# Patient Record
Sex: MALE | Race: WHITE | NOT HISPANIC OR LATINO | Employment: FULL TIME | URBAN - METROPOLITAN AREA
[De-identification: names, ages, dates, MRNs, and addresses within clinical notes are randomized per-mention and may not be internally consistent; named-entity substitution may affect disease eponyms.]

---

## 2019-03-22 ENCOUNTER — OFFICE VISIT CONVERTED (OUTPATIENT)
Dept: ORTHOPEDIC SURGERY | Facility: CLINIC | Age: 49
End: 2019-03-22
Attending: ORTHOPAEDIC SURGERY

## 2019-08-13 ENCOUNTER — HOSPITAL ENCOUNTER (OUTPATIENT)
Dept: CARDIOLOGY | Facility: HOSPITAL | Age: 49
Discharge: HOME | End: 2019-08-13
Attending: FAMILY MEDICINE

## 2020-05-19 ENCOUNTER — HOSPITAL ENCOUNTER (OUTPATIENT)
Dept: LAB | Facility: HOSPITAL | Age: 50
Discharge: HOME | End: 2020-05-19
Attending: FAMILY MEDICINE

## 2020-05-19 LAB
ALBUMIN SERPL-MCNC: 4.6 G/DL (ref 3.5–5)
ALBUMIN/GLOB SERPL: 1.4 {RATIO} (ref 1.4–2.6)
ALP SERPL-CCNC: 84 U/L (ref 53–128)
ALT SERPL-CCNC: 47 U/L (ref 10–40)
ANION GAP SERPL CALC-SCNC: 25 MMOL/L (ref 8–19)
AST SERPL-CCNC: 32 U/L (ref 15–50)
BASOPHILS # BLD AUTO: 0.05 10*3/UL (ref 0–0.2)
BASOPHILS NFR BLD AUTO: 0.7 % (ref 0–3)
BILIRUB SERPL-MCNC: 0.46 MG/DL (ref 0.2–1.3)
BUN SERPL-MCNC: 16 MG/DL (ref 5–25)
BUN/CREAT SERPL: 12 {RATIO} (ref 6–20)
CALCIUM SERPL-MCNC: 10.3 MG/DL (ref 8.7–10.4)
CHLORIDE SERPL-SCNC: 100 MMOL/L (ref 99–111)
CHOLEST SERPL-MCNC: 168 MG/DL (ref 107–200)
CHOLEST/HDLC SERPL: 4.8 {RATIO} (ref 3–6)
CONV ABS IMM GRAN: 0.01 10*3/UL (ref 0–0.2)
CONV CO2: 22 MMOL/L (ref 22–32)
CONV IMMATURE GRAN: 0.1 % (ref 0–1.8)
CONV TOTAL PROTEIN: 7.8 G/DL (ref 6.3–8.2)
CREAT UR-MCNC: 1.3 MG/DL (ref 0.7–1.2)
DEPRECATED RDW RBC AUTO: 45.8 FL (ref 35.1–43.9)
EOSINOPHIL # BLD AUTO: 0.16 10*3/UL (ref 0–0.7)
EOSINOPHIL # BLD AUTO: 2.3 % (ref 0–7)
ERYTHROCYTE [DISTWIDTH] IN BLOOD BY AUTOMATED COUNT: 13.2 % (ref 11.6–14.4)
GFR SERPLBLD BASED ON 1.73 SQ M-ARVRAT: >60 ML/MIN/{1.73_M2}
GLOBULIN UR ELPH-MCNC: 3.2 G/DL (ref 2–3.5)
GLUCOSE SERPL-MCNC: 91 MG/DL (ref 70–99)
HCT VFR BLD AUTO: 49.9 % (ref 42–52)
HDLC SERPL-MCNC: 35 MG/DL (ref 40–60)
HGB BLD-MCNC: 16.8 G/DL (ref 14–18)
LDLC SERPL CALC-MCNC: 92 MG/DL (ref 70–100)
LYMPHOCYTES # BLD AUTO: 2.45 10*3/UL (ref 1–5)
LYMPHOCYTES NFR BLD AUTO: 35.8 % (ref 20–45)
MCH RBC QN AUTO: 31.8 PG (ref 27–31)
MCHC RBC AUTO-ENTMCNC: 33.7 G/DL (ref 33–37)
MCV RBC AUTO: 94.5 FL (ref 80–96)
MONOCYTES # BLD AUTO: 0.6 10*3/UL (ref 0.2–1.2)
MONOCYTES NFR BLD AUTO: 8.8 % (ref 3–10)
NEUTROPHILS # BLD AUTO: 3.58 10*3/UL (ref 2–8)
NEUTROPHILS NFR BLD AUTO: 52.3 % (ref 30–85)
NRBC CBCN: 0 % (ref 0–0.7)
OSMOLALITY SERPL CALC.SUM OF ELEC: 295 MOSM/KG (ref 273–304)
PLATELET # BLD AUTO: 321 10*3/UL (ref 130–400)
PMV BLD AUTO: 9.7 FL (ref 9.4–12.4)
POTASSIUM SERPL-SCNC: 4.8 MMOL/L (ref 3.5–5.3)
PSA SERPL-MCNC: 0.86 NG/ML (ref 0–4)
RBC # BLD AUTO: 5.28 10*6/UL (ref 4.7–6.1)
SODIUM SERPL-SCNC: 142 MMOL/L (ref 135–147)
T4 FREE SERPL-MCNC: 1.2 NG/DL (ref 0.9–1.8)
TESTOST SERPL-MCNC: 842 NG/DL (ref 193–740)
TRIGL SERPL-MCNC: 203 MG/DL (ref 40–150)
TSH SERPL-ACNC: 1.63 M[IU]/L (ref 0.27–4.2)
VLDLC SERPL-MCNC: 41 MG/DL (ref 5–37)
WBC # BLD AUTO: 6.85 10*3/UL (ref 4.8–10.8)

## 2020-05-22 LAB — CONV ESTROGENS, TOTAL, SERUM: 43 PG/ML (ref 40–115)

## 2020-10-13 ENCOUNTER — OFFICE VISIT CONVERTED (OUTPATIENT)
Dept: OTOLARYNGOLOGY | Facility: CLINIC | Age: 50
End: 2020-10-13
Attending: OTOLARYNGOLOGY

## 2020-12-08 ENCOUNTER — OFFICE VISIT CONVERTED (OUTPATIENT)
Dept: OTOLARYNGOLOGY | Facility: CLINIC | Age: 50
End: 2020-12-08
Attending: AUDIOLOGIST

## 2021-02-08 ENCOUNTER — OFFICE VISIT CONVERTED (OUTPATIENT)
Dept: INTERNAL MEDICINE | Facility: CLINIC | Age: 51
End: 2021-02-08
Attending: INTERNAL MEDICINE

## 2021-02-08 ENCOUNTER — HOSPITAL ENCOUNTER (OUTPATIENT)
Dept: OTHER | Facility: HOSPITAL | Age: 51
Discharge: HOME | End: 2021-02-08
Attending: INTERNAL MEDICINE

## 2021-02-08 ENCOUNTER — CONVERSION ENCOUNTER (OUTPATIENT)
Dept: INTERNAL MEDICINE | Facility: CLINIC | Age: 51
End: 2021-02-08

## 2021-02-08 LAB — PSA SERPL-MCNC: 0.55 NG/ML (ref 0–4)

## 2021-02-09 LAB
ALBUMIN SERPL-MCNC: 4.5 G/DL (ref 3.5–5)
ALBUMIN/GLOB SERPL: 1.3 {RATIO} (ref 1.4–2.6)
ALP SERPL-CCNC: 137 U/L (ref 53–128)
ALT SERPL-CCNC: 32 U/L (ref 10–40)
ANION GAP SERPL CALC-SCNC: 20 MMOL/L (ref 8–19)
AST SERPL-CCNC: 25 U/L (ref 15–50)
BASOPHILS # BLD AUTO: 0.07 10*3/UL (ref 0–0.2)
BASOPHILS NFR BLD AUTO: 0.8 % (ref 0–3)
BILIRUB SERPL-MCNC: 0.42 MG/DL (ref 0.2–1.3)
BUN SERPL-MCNC: 18 MG/DL (ref 5–25)
BUN/CREAT SERPL: 16 {RATIO} (ref 6–20)
CALCIUM SERPL-MCNC: 9.8 MG/DL (ref 8.7–10.4)
CHLORIDE SERPL-SCNC: 99 MMOL/L (ref 99–111)
CONV ABS IMM GRAN: 0.02 10*3/UL (ref 0–0.2)
CONV CO2: 24 MMOL/L (ref 22–32)
CONV IMMATURE GRAN: 0.2 % (ref 0–1.8)
CONV TOTAL PROTEIN: 7.9 G/DL (ref 6.3–8.2)
CREAT UR-MCNC: 1.11 MG/DL (ref 0.7–1.2)
DEPRECATED RDW RBC AUTO: 42.3 FL (ref 35.1–43.9)
EOSINOPHIL # BLD AUTO: 0.42 10*3/UL (ref 0–0.7)
EOSINOPHIL # BLD AUTO: 4.8 % (ref 0–7)
ERYTHROCYTE [DISTWIDTH] IN BLOOD BY AUTOMATED COUNT: 12.8 % (ref 11.6–14.4)
GFR SERPLBLD BASED ON 1.73 SQ M-ARVRAT: >60 ML/MIN/{1.73_M2}
GLOBULIN UR ELPH-MCNC: 3.4 G/DL (ref 2–3.5)
GLUCOSE SERPL-MCNC: 83 MG/DL (ref 70–99)
HCT VFR BLD AUTO: 46.9 % (ref 42–52)
HGB BLD-MCNC: 16.4 G/DL (ref 14–18)
IRON SATN MFR SERPL: 27 % (ref 20–55)
IRON SERPL-MCNC: 85 UG/DL (ref 70–180)
LYMPHOCYTES # BLD AUTO: 3.02 10*3/UL (ref 1–5)
LYMPHOCYTES NFR BLD AUTO: 34.6 % (ref 20–45)
MAGNESIUM SERPL-MCNC: 2.06 MG/DL (ref 1.6–2.3)
MCH RBC QN AUTO: 31.7 PG (ref 27–31)
MCHC RBC AUTO-ENTMCNC: 35 G/DL (ref 33–37)
MCV RBC AUTO: 90.7 FL (ref 80–96)
MONOCYTES # BLD AUTO: 0.64 10*3/UL (ref 0.2–1.2)
MONOCYTES NFR BLD AUTO: 7.3 % (ref 3–10)
NEUTROPHILS # BLD AUTO: 4.57 10*3/UL (ref 2–8)
NEUTROPHILS NFR BLD AUTO: 52.3 % (ref 30–85)
NRBC CBCN: 0 % (ref 0–0.7)
OSMOLALITY SERPL CALC.SUM OF ELEC: 289 MOSM/KG (ref 273–304)
PLATELET # BLD AUTO: 330 10*3/UL (ref 130–400)
PMV BLD AUTO: 9.6 FL (ref 9.4–12.4)
POTASSIUM SERPL-SCNC: 3.9 MMOL/L (ref 3.5–5.3)
RBC # BLD AUTO: 5.17 10*6/UL (ref 4.7–6.1)
SODIUM SERPL-SCNC: 139 MMOL/L (ref 135–147)
TESTOST SERPL-MCNC: 134 NG/DL (ref 193–740)
TIBC SERPL-MCNC: 313 UG/DL (ref 245–450)
TRANSFERRIN SERPL-MCNC: 219 MG/DL (ref 215–365)
TSH SERPL-ACNC: 2.29 M[IU]/L (ref 0.27–4.2)
VIT B12 SERPL-MCNC: 729 PG/ML (ref 211–911)
WBC # BLD AUTO: 8.74 10*3/UL (ref 4.8–10.8)

## 2021-03-09 ENCOUNTER — OFFICE VISIT CONVERTED (OUTPATIENT)
Dept: OTOLARYNGOLOGY | Facility: CLINIC | Age: 51
End: 2021-03-09
Attending: OTOLARYNGOLOGY

## 2021-03-19 ENCOUNTER — OFFICE VISIT CONVERTED (OUTPATIENT)
Dept: INTERNAL MEDICINE | Facility: CLINIC | Age: 51
End: 2021-03-19
Attending: INTERNAL MEDICINE

## 2021-03-19 ENCOUNTER — CONVERSION ENCOUNTER (OUTPATIENT)
Dept: INTERNAL MEDICINE | Facility: CLINIC | Age: 51
End: 2021-03-19

## 2021-04-15 ENCOUNTER — OFFICE VISIT CONVERTED (OUTPATIENT)
Dept: OTOLARYNGOLOGY | Facility: CLINIC | Age: 51
End: 2021-04-15
Attending: OTOLARYNGOLOGY

## 2021-04-19 ENCOUNTER — HOSPITAL ENCOUNTER (OUTPATIENT)
Dept: URGENT CARE | Facility: CLINIC | Age: 51
Discharge: HOME | End: 2021-04-19
Attending: NURSE PRACTITIONER

## 2021-04-20 LAB — SARS-COV-2 RNA SPEC QL NAA+PROBE: NOT DETECTED

## 2021-05-05 ENCOUNTER — OFFICE VISIT CONVERTED (OUTPATIENT)
Dept: INTERNAL MEDICINE | Facility: CLINIC | Age: 51
End: 2021-05-05
Attending: INTERNAL MEDICINE

## 2021-05-10 NOTE — H&P
History and Physical      Patient Name: Yousuf Em   Patient ID: [id number]   Sex: Male   YOB: 1970    Primary Care Provider: Niels Velasco MD   Referring Provider: Niels Velasco MD    Visit Date: October 13, 2020    Provider: Teo Lamb MD   [place] [facility]   Location Address: [address] [place] [address], [address]  [id number]   Location Phone: [phone]          Chief Complaint     1.  Hearing loss    2.  Eustachian tube dysfunction    3.  Otitis media       History Of Present Illness  Yousuf Em is a 50 year old /White male who presents to the office today as a consult from Niels Velasco MD.      He presents the clinic today for a 6-month history of decreased hearing, eustachian tube dysfunction, and otitis media with effusion.  He notes a lengthy history of recurrent ear disease as a child, and had frequent ear infections.  He informing that he has at least one ear infection per year with decreased hearing.  About 6 months ago, he noted decreased hearing in both ears, and started using hearing aids due to difficulty with hearing.  He denies any nasal symptoms such as progressively worsening congestion or obstruction, and has had no issues with nosebleeds.  He has not had a hearing test in quite some time.  He does have issues with seasonal allergies, and takes Flonase and Zyrtec as well as Pataday for this.  He notes that these issues are fairly stable.       Past Medical History  Allergic rhinitis; Arthritis; Depression; ETD (eustachian tube dysfunction); Hearing loss; High cholesterol; Hyperlipemia; Hypertension; Labral tear of long head of biceps tendon, left, initial encounter; Left shoulder labral tear ; Right lateral epicondylitis; Seasonal allergies; Thyroid disorder         Past Surgical History  Cholecystectomy; Colonoscopy; Gallbladder; Joint Surgery         Medication List  amphetamine 1.25 mg/mL oral suspen, IR - ER, biphasic 24hr;  "anastrozole 1 mg oral tablet; atorvastatin 20 mg oral tablet; carvedilol 6.25 mg oral tablet; levothyroxine 75 mcg oral tablet; lisinopril-hydrochlorothiazide 20-25 mg oral tablet; simvastatin 20 mg Oral tablet; venlafaxine 150 mg oral capsule,extended release 24hr         Allergy List  NO KNOWN DRUG ALLERGIES         Family Medical History  Family history of Arthritis; Family history of cancer; Family history of stroke; Family history of heart disease; Family history of diabetes mellitus         Social History  Alcohol Use (Current some day); .; lives with children; lives with other; lives with spouse; .; Recreational Drug Use (Current some day); Single.; Tobacco (Never); Working         Review of Systems  · Constitutional  o Denies  o : fever, night sweats, weight loss  · Eyes  o Denies  o : discharge from eye, impaired vision  · HENT  o Admits  o : *See HPI  · Cardiovascular  o Denies  o : chest pain, irregular heart beats  · Respiratory  o Denies  o : shortness of breath, wheezing, coughing up blood  · Gastrointestinal  o Denies  o : heartburn, reflux, vomiting blood  · Genitourinary  o Denies  o : frequency  · Integument  o Denies  o : rash, skin dryness  · Neurologic  o Denies  o : seizures, loss of balance, loss of consciousness, dizziness  · Endocrine  o Denies  o : cold intolerance, heat intolerance  · Heme-Lymph  o Denies  o : easy bleeding, anemia      Vitals  Date Time BP Position Site L\R Cuff Size HR RR TEMP (F) WT  HT  BMI kg/m2 BSA m2 O2 Sat FR L/min FiO2        10/13/2020 10:29 AM        97.3 252lbs 8oz 5'  8\" 38.39 2.34             Physical Examination  · Constitutional  o Appearance  o : well developed, well-nourished, alert and in no acute distress, voice clear and strong  · Head and Face  o Head  o :   § Inspection  § : no deformities or lesions  o Face  o :   § Inspection  § : No facial lesions; House-Brackmann I/VI bilaterally  § Palpation  § : No TMJ crepitus nor  " muscle tenderness bilaterally  · Eyes  o Vision  o :   § Visual Fields  § : Extraocular movements are intact. No spontaneous or gaze-induced nystagmus.  o Conjunctivae  o : clear  o Sclerae  o : clear  o Pupils and Irises  o : pupils equal, round, and reactive to light.   · Ears, Nose, Mouth and Throat  o Ears  o :   § External Ears  § : appearance within normal limits, no lesions present  § Otoscopic Examination  § : tympanic membrane appearance dull and hypervascular bilaterally, consistent with otitis media. The left ear drum was focused on using the microscope and phenol was used to anesthetize the anterior inferior quadrant. An incision was made and thick mucoid inspissated mucus was suctioned out of the middle ear. An Ellis tube was placed without difficulty and Ciprodex drops were infused. The patient immediately noted improvement in his hearing. The same procedure was then performed on the right ear with the same findings and results.  § Hearing  § : intact to conversational voice both ears  o Nose  o :   § External Nose  § : appearance normal  § Intranasal Exam  § : mucosa within normal limits, vestibules normal, no intranasal lesions present, septum midline, sinuses non tender to percussion  o Oral Cavity  o :   § Oral Mucosa  § : oral mucosa normal without pallor or cyanosis  § Lips  § : lip appearance normal  § Teeth  § : normal dentition for age  § Gums  § : gums pink, non-swollen, no bleeding present  § Tongue  § : tongue appearance normal; normal mobility  § Palate  § : hard palate normal, soft palate appearance normal with symmetric mobility  o Throat  o :   § Oropharynx  § : no inflammation or lesions present, tonsils within normal limits  § Hypopharynx  § : appearance within normal limits, superior epiglottis within normal limits  § Larynx  § : appearance within normal limits, vocal cords within normal limits, no lesions present  · Neck  o Inspection/Palpation  o : normal appearance, no masses  or tenderness, trachea midline; thyroid size normal, nontender, no nodules or masses present on palpation  · Respiratory  o Respiratory Effort  o : breathing unlabored  o Inspection of Chest  o : normal appearance, no retractions  · Cardiovascular  o Heart  o : regular rate and rhythm  · Lymphatic  o Neck  o : no lymphadenopathy present  o Supraclavicular Nodes  o : no lymphadenopathy present  o Preauricular Nodes  o : no lymphadenopathy present  · Skin and Subcutaneous Tissue  o General Inspection  o : Regarding face and neck - there are no rashes present, no lesions present, and no areas of discoloration  · Neurologic  o Cranial Nerves  o : cranial nerves II-XII are grossly intact bilaterally  o Gait and Station  o : normal gait, able to stand without diffculty  · Psychiatric  o Judgement and Insight  o : judgment and insight intact  o Mood and Affect  o : mood normal, affect appropriate          Assessment  · Eustachian tube dysfunction     381.81/H69.80  · Hearing loss     389.9/H91.90  · Otitis media     382.9/H66.90      Plan  · Orders  o PETs [facility] ([id number]) - 381.81/H69.80, 389.9/H91.90, 382.9/H66.90 - 10/14/2020   Both left and right ears were operated on today. [id number], bilateral procedure  o Audiometry, pure-tone (threshold); air and bone ([id number]) - 381.81/H69.80, 389.9/H91.90, 382.9/H66.90 - 11/25/2020  o Tympanogram (Impedance Testing) [facility] ([id number]) - 381.81/H69.80, 389.9/H91.90, 382.9/H66.90 - 10/14/2020  · Medications  o Medications have been Reconciled  o Transition of Care or Provider Policy  · Instructions  o He presents the clinic today for a 6-month history of decreased hearing, eustachian tube dysfunction, and otitis media with effusion. He notes a lengthy history of recurrent ear disease as a child, and had frequent ear infections. He informing that he has at least one ear infection per year with decreased hearing. About 6 months ago, he noted decreased hearing in both ears, and started using hearing  aids due to difficulty with hearing. He denies any nasal symptoms such as progressively worsening congestion or obstruction, and has had no issues with nosebleeds. He has not had a hearing test in quite some time. He does have issues with seasonal allergies, and takes Flonase and Zyrtec as well as Pataday for this. He notes that these issues are fairly stable. On exam today he had bilateral mucoid effusions. I obtained a tympanogram which showed flat responses, consistent with middle ear fluid. I discussed this with him and did recommend bilateral myringotomy and PE tube placement which she elected to go ahead and proceed with today in the clinic. I was able to perform the procedure on both sides and he did have thick mucoid fluid which was suctioned out of the middle ear. He was very pleased with the improvement in his hearing. I will plan on seeing him back in the clinic in 6 weeks and will obtain an audiogram at that point.  o Electronically Identified Patient Education Materials Provided Electronically  · Correspondence  o ENT Letter to Referring MD (Niels Velasco MD) - 10/14/2020            Electronically Signed by: Teo Lamb MD -Author on October 14, 2020 02:49:17 PM

## 2021-05-10 NOTE — H&P
My Depression Action Plan  Name: Alexandru Crews   Date of Birth 9/22/1932  Date: 5/23/2019    My doctor: Paul Knowles   My clinic: [address]  [place] [address]  [phone]          GREEN    ZONE   Good Control    What it looks like:     Things are going generally well. You have normal up s and down s. You may even feel depressed from time to time, but bad moods usually last less than a day.   What you need to do:  1. Continue to care for yourself (see self care plan)  2. Check your depression survival kit and update it as needed  3. Follow your physician s recommendations including any medication.  4. Do not stop taking medication unless you consult with your physician first.           YELLOW         ZONE Getting Worse    What it looks like:     Depression is starting to interfere with your life.     It may be hard to get out of bed; you may be starting to isolate yourself from others.    Symptoms of depression are starting to last most all day and this has happened for several days.     You may have suicidal thoughts but they are not constant.   What you need to do:     1. Call your care team, your response to treatment will improve if you keep your care team informed of your progress. Yellow periods are signs an adjustment may need to be made.     2. Continue your self-care, even if you have to fake it!    3. Talk to someone in your support network    4. Open up your depression survival kit           RED    ZONE Medical Alert - Get Help    What it looks like:     Depression is seriously interfering with your life.     You may experience these or other symptoms: You can t get out of bed most days, can t work or engage in other necessary activities, you have trouble taking care of basic hygiene, or basic responsibilities, thoughts of suicide or death that will not go away, self-injurious behavior.     What you need to do:  1. Call your care team and request  "   History and Physical      Patient Name: Yousuf Em   Patient ID: [id number]   Sex: Male   YOB: 1970    Primary Care Provider: Niels Velasco MD   Referring Provider: Niels Velasco MD    Visit Date: February 8, 2021    Provider: Angela Smith MD   Location: [facility]   Location Address: [address] [place] [address], [address]  [place], [place]  [id number]   Location Phone: [phone]          Chief Complaint  · \"Establish Care\"  · \"I was taking Testosterone in the past but haven't taken any in about 3 months\"      History Of Present Illness  Yousuf Em is a 50 year old /White male who presents for evaluation and treatment of:      Last PCP: Dr Florence  Last labs: about 9 months ago  Colon: has one schedule for 08/2021  Flu: 02/2021  Shingles: 02/2021    Fatigue-has had on going fatigue for several years was taking testosterone injections but have been off the medication for several months but has not noticed change in symptoms expect for less sweating. Reports has gained 30lbs and is concerned this may be due to issues with fatigue or also could be related to less activity during pandemic.    HTN-Reports started carvedilol 4 months ago, averages now 114/80. denies chest pain and headaches, does have occasional shortness of breath when doing normal activities, sometimes feels like legs are heavy and does stretching, denies leg cramps    Depression/Anxiety- taking effexor reports doing well, denies SI/HI    Insomnia-wakes up frequently, most night takes Zquil    Thyroid-reports taking thyroid medication regularly, needs labs checked for this    Right Hand Injury-August right hand injury cut tips of 3rd and 4th digits had loss of sensation in 4th digit    Ear Tubes-Recently had ear tubes tubes put in had a chronic ear infection for 9 months reports much improved symptoms since then    Hx of cholitis reports has occasional bouts of diarrhea and constipation related to this and " a same-day appointment. If they are not available (weekends or after hours) call your local crisis line, emergency room or 911.            Depression Self Care Plan / Survival Kit    Self-Care for Depression  Here s the deal. Your body and mind are really not as separate as most people think.  What you do and think affects how you feel and how you feel influences what you do and think. This means if you do things that people who feel good do, it will help you feel better.  Sometimes this is all it takes.  There is also a place for medication and therapy depending on how severe your depression is, so be sure to consult with your medical provider and/ or Behavioral Health Consultant if your symptoms are worsening or not improving.     In order to better manage my stress, I will:    Exercise  Get some form of exercise, every day. This will help reduce pain and release endorphins, the  feel good  chemicals in your brain. This is almost as good as taking antidepressants!  This is not the same as joining a gym and then never going! (they count on that by the way ) It can be as simple as just going for a walk or doing some gardening, anything that will get you moving.      Hygiene   Maintain good hygiene (Get out of bed in the morning, Make your bed, Brush your teeth, Take a shower, and Get dressed like you were going to work, even if you are unemployed).  If your clothes don't fit try to get ones that do.    Diet  I will strive to eat foods that are good for me, drink plenty of water, and avoid excessive sugar, caffeine, alcohol, and other mood-altering substances.  Some foods that are helpful in depression are: complex carbohydrates, B vitamins, flaxseed, fish or fish oil, fresh fruits and vegetables.    Psychotherapy  I agree to participate in Individual Therapy (if recommended).    Medication  If prescribed medications, I agree to take them.  Missing doses can result in serious side effects.  I understand that drinking  several years ago had polyps removed.       Past Medical History  Disease Name Date Onset Notes   Allergic rhinitis --  --    Arthritis --  --    Colitis --  --    Depression --  --    ETD (eustachian tube dysfunction) --  --    Forgetfulness --  --    Hearing loss --  --    High cholesterol --  --    Hyperlipemia --  --    Hypertension --  --    Labral tear of long head of biceps tendon, left, initial encounter 09/30/2015 --    Left shoulder labral tear  09/25/2015 --    Right lateral epicondylitis 10/21/2016 --    Seasonal allergies --  --    Thyroid disorder --  --          Past Surgical History  Procedure Name Date Notes   Cholecystectomy 2000 --    Colonoscopy --  --    Gallbladder --  --    Joint Surgery --  --          Medication List  Name Date Started Instructions   atorvastatin 20 mg oral tablet  take 1 tablet (20 mg) by oral route once daily   carvedilol 6.25 mg oral tablet  take 1 tablet (6.25 mg) by oral route 2 times per day with food   levothyroxine 75 mcg oral tablet  take 1 tablet (75 mcg) by oral route once daily   lisinopril-hydrochlorothiazide 20-25 mg oral tablet  take 2 tablets by oral route once daily   venlafaxine 75 mg oral tablet extended release 24hr 02/08/2021 one tab po BID for 2 weeks; then take one po daily for 2 weeks         Allergy List  Allergen Name Date Reaction Notes   NO KNOWN DRUG ALLERGIES --  --  --          Family Medical History  Disease Name Relative/Age Notes   Family history of Arthritis Mother/   Mother   Family history of cancer Mother/   --    Family history of stroke Grandmother (maternal)/   --    Family history of heart disease Father/  Mother/  Sister/   --    Family history of diabetes mellitus Mother/   --          Social History  Finding Status Start/Stop Quantity Notes   Alcohol Use Current some day --/-- --  rarely drinks, less than 1 drink per day, has been drinking for 11-20 years   . --  --/-- --  --    lives with children --  --/-- --  --   "  lives with other --  --/-- --  --    lives with spouse --  --/-- --  --    . --  --/-- --  --    Recreational Drug Use Current some day --/-- --  yes  no   Single. --  --/-- --  --    Tobacco Never --/-- --  never smoker   Working --  --/-- --  --          Review of Systems  · Constitutional  o Admits  o : fatigue  o Denies  o : fever, weight loss, weight gain  · Cardiovascular  o Denies  o : lower extremity edema, claudication, chest pressure, palpitations  · Respiratory  o Denies  o : shortness of breath, wheezing, frequent cough, hemoptysis, dyspnea on exertion  · Gastrointestinal  o Denies  o : nausea, vomiting, diarrhea, constipation, abdominal pain      Vitals  Date Time BP Position Site L\R Cuff Size HR RR TEMP (F) WT  HT  BMI kg/m2 BSA m2 O2 Sat FR L/min FiO2 HC       03/22/2019 03:23 PM      113 - R   235lbs 0oz 5'  8\" 35.73 2.26 96 %      10/13/2020 10:29 AM        97.3 252lbs 8oz 5'  8\" 38.39 2.34       12/08/2020 10:55 AM        97.8 250lbs 0oz 5'  8\" 38.01 2.33       02/08/2021 02:10 /88 Sitting    78 - R  97.8 258lbs 0oz 5'  8\" 39.23 2.37 97 %  21%          Physical Examination  · Constitutional  o Appearance  o : obese, well developed  · Head and Face  o Head  o :   § Inspection  § : atraumatic, normocephalic  · Eyes  o Eyes  o : extraocular movements intact, no scleral icterus, no conjunctival injection  · Respiratory  o Respiratory Effort  o : breathing comfortably, symmetric chest rise  o Auscultation of Lungs  o : clear to asculatation bilaterally, no wheezes, rales, or rhonchii  · Cardiovascular  o Heart  o :   § Auscultation of Heart  § : regular rate and rhythm, no murmurs, rubs, or gallops  o Peripheral Vascular System  o :   § Extremities  § : no edema  · Neurologic  o Mental Status Examination  o :   § Orientation  § : grossly oriented to person, place and time  o Gait and Station  o :   § Gait Screening  § : normal gait  · Psychiatric  o General  o : normal mood and " alcohol, or other illicit drug use, may cause potential side effects.  I will not stop my medication abruptly without first discussing it with my provider.    Staying Connected With Others  I will stay in touch with my friends, family members, and my primary care provider/team.    Use your imagination  Be creative.  We all have a creative side; it doesn t matter if it s oil painting, sand castles, or mud pies! This will also kick up the endorphins.    Witness Beauty  (AKA stop and smell the roses) Take a look outside, even in mid-winter. Notice colors, textures. Watch the squirrels and birds.     Service to others  Be of service to others.  There is always someone else in need.  By helping others we can  get out of ourselves  and remember the really important things.  This also provides opportunities for practicing all the other parts of the program.    Humor  Laugh and be silly!  Adjust your TV habits for less news and crime-drama and more comedy.    Control your stress  Try breathing deep, massage therapy, biofeedback, and meditation. Find time to relax each day.     My support system    Clinic Contact:  Phone number:    Contact 1:  Phone number:    Contact 2:  Phone number:    Taoist/:  Phone number:    Therapist:  Phone number:    Local crisis center:    Phone number:    Other community support:  Phone number:       affect          Assessment  · Hypertension     401.9/I10  Discussed possibly adjust blood pressure medications however for now stay the same  · Hyperlipemia     272.4/E78.5  · Thyroid disorder     246.9/E07.9  Will check labs adjust meds as needed based on results  · Screening for depression     V79.0/Z13.89  · Screening for prostate cancer     V76.44/Z12.5  · Fatigue     780.79/R53.83  · Major depressive disorder     296.20/F32.2  Will decrease Effexor  Will start Trintellix  · Obesity     278.00/E66.9       Will check labs adjust meds as needed based on results  Discussed obesity and changing diet exercise     Problems Reconciled  Plan  · Orders  o PSA Ultrasensitive, ANNUAL SCREENING [facility] (95665, ) - V76.44/Z12.5 - 02/08/2021  o Male Fatigue Panel (CBC, CMP, TSH, B12, Testosterone) [facility] (91541, 93790, 43986, 44668, 04754) - 780.79/R53.83 - 02/08/2021   consider stopping lisinopril/hctz at night  o Iron panel (iron, TIBC, transferrin saturation) (48596, 83372, 47525) - 780.79/R53.83 - 02/08/2021  o ACO-18: Positive screen for clinical depression using a standardized tool and a follow-up plan documented () - - 02/08/2021  o ACO-14: Influenza immunization administered or previously received [facility] () - - 02/08/2021  o ACO-39: Current medications updated and reviewed (, 1159F) - - 02/08/2021  o Magnesium, serum (16211) - 401.9/I10 - 02/08/2021  o PSA ultrasensitive DIAGNOSTIC [facility] (02697) - - 02/08/2021  · Medications  o Trintellix 5 mg oral tablet   SIG: take 1 tablet (5 mg) by oral route once daily at the same time each day   DISP: (90) Tablet with 0 refills  Prescribed on 02/08/2021     o venlafaxine 75 mg oral tablet extended release 24hr   SIG: one tab po BID for 2 weeks; then take one po daily for 2 weeks   DISP: (60) Tablet with 0 refills  Adjusted on 02/08/2021     o Transition of Care or Provider Policy  · Instructions  o Depression Screen completed and scanned into the EMR under the designated  folder within the patient's documents.  o Today's PHQ-9 result is _16__  o Patient was educated/instructed on their diagnosis, treatment and medications prior to discharge from the clinic today.  · Disposition  o 1 Month Follow Up            Electronically Signed by: Angela Smith MD -Author on February 8, 2021 11:54:19 PM

## 2021-05-13 NOTE — PROGRESS NOTES
Progress Note      Patient Name: Yousuf Em   Patient ID: [id number]   Sex: Male   YOB: 1970    Primary Care Provider: Niels Velasco MD   Referring Provider: Niels Velasco MD    Visit Date: December 8, 2020    Provider: Teo Lamb MD   Location: [facility]   Location Address: [address] [place] [address], [address]  [id number]   Location Phone: [phone]          Chief Complaint     1.  Eustachian tube dysfunction    2.  Bilateral ear drainage    3.  Hearing loss, asymmetric       History Of Present Illness  Yousuf Em is a 50 year old /White male who presents to the office today for a follow-up visit.      He presents the clinic today for follow-up regarding issues with his ears and bilateral ear infections that was diagnosed at the last clinic visit.  He had thick mucoid effusions, and PE tubes were placed at that time.  He notes that his hearing has been much better after the tubes were placed, but he has had significant drainage which is purulent out of both of his ears.  He denies any ear pain or pressure.  He did have an audiogram today which shows mild hearing loss rising to normal hearing the right ear downsloping to moderate sensorineural hearing loss with noise notch type pattern.  The left ear is significantly weaker than the right.  Tympanograms suggest patent and functioning PE tubes.  Word discrimination scores are 100% on the right and 60% in the left.  He informing today that he has had asymmetric hearing for over 25 years with the left ear being weaker.  He has been keeping his ears dry in general.       Past Medical History  Allergic rhinitis; Arthritis; Depression; ETD (eustachian tube dysfunction); Hearing loss; High cholesterol; Hyperlipemia; Hypertension; Labral tear of long head of biceps tendon, left, initial encounter; Left shoulder labral tear ; Right lateral epicondylitis; Seasonal allergies; Thyroid disorder         Past  "Surgical History  Cholecystectomy; Colonoscopy; Gallbladder; Joint Surgery         Medication List  amphetamine 1.25 mg/mL oral suspen, IR - ER, biphasic 24hr; anastrozole 1 mg oral tablet; atorvastatin 20 mg oral tablet; carvedilol 6.25 mg oral tablet; levothyroxine 75 mcg oral tablet; lisinopril-hydrochlorothiazide 20-25 mg oral tablet; simvastatin 20 mg Oral tablet; venlafaxine 150 mg oral capsule,extended release 24hr         Allergy List  NO KNOWN DRUG ALLERGIES         Family Medical History  Family history of Arthritis; Family history of cancer; Family history of stroke; Family history of heart disease; Family history of diabetes mellitus         Social History  Alcohol Use (Current some day); .; lives with children; lives with other; lives with spouse; .; Recreational Drug Use (Current some day); Single.; Tobacco (Never); Working         Review of Systems  · Constitutional  o Denies  o : fever, night sweats, weight loss  · Eyes  o Denies  o : discharge from eye, impaired vision  · HENT  o Admits  o : *See HPI  · Cardiovascular  o Denies  o : chest pain, irregular heart beats  · Respiratory  o Denies  o : shortness of breath, wheezing, coughing up blood  · Gastrointestinal  o Denies  o : heartburn, reflux, vomiting blood  · Genitourinary  o Denies  o : frequency  · Integument  o Denies  o : rash, skin dryness  · Neurologic  o Denies  o : seizures, loss of balance, loss of consciousness, dizziness  · Endocrine  o Denies  o : cold intolerance, heat intolerance  · Heme-Lymph  o Denies  o : easy bleeding, anemia      Vitals  Date Time BP Position Site L\R Cuff Size HR RR TEMP (F) WT  HT  BMI kg/m2 BSA m2 O2 Sat FR L/min FiO2 HC       12/08/2020 10:55 AM        97.8 250lbs 0oz 5'  8\" 38.01 2.33             Physical Examination  · Constitutional  o Appearance  o : well developed, well-nourished, alert and in no acute distress, voice clear and strong  · Head and Face  o Head  o :   § Inspection  § : " no deformities or lesions  o Face  o :   § Inspection  § : No facial lesions; House-Brackmann I/VI bilaterally  § Palpation  § : No TMJ crepitus nor  muscle tenderness bilaterally  · Eyes  o Vision  o :   § Visual Fields  § : Extraocular movements are intact. No spontaneous or gaze-induced nystagmus.  o Conjunctivae  o : clear  o Sclerae  o : clear  o Pupils and Irises  o : pupils equal, round, and reactive to light.   · Ears, Nose, Mouth and Throat  o Ears  o :   § External Ears  § : appearance within normal limits, no lesions present  § Otoscopic Examination  § : Cerumen impactions bilaterally, cleared with suction and microscope, mucoid effusions bilaterally with some granulation tissue surrounding the PE tube on the right side. PE tubes are patent and functioning. Ciprodex drops were infused.  § Hearing  § : intact to conversational voice both ears  o Nose  o :   § External Nose  § : appearance normal  § Intranasal Exam  § : mucosa within normal limits, vestibules normal, no intranasal lesions present, septum midline, sinuses non tender to percussion  o Oral Cavity  o :   § Oral Mucosa  § : oral mucosa normal without pallor or cyanosis  § Lips  § : lip appearance normal  § Teeth  § : normal dentition for age  § Gums  § : gums pink, non-swollen, no bleeding present  § Tongue  § : tongue appearance normal; normal mobility  § Palate  § : hard palate normal, soft palate appearance normal with symmetric mobility  o Throat  o :   § Oropharynx  § : no inflammation or lesions present, tonsils within normal limits  § Hypopharynx  § : appearance within normal limits, superior epiglottis within normal limits  § Larynx  § : appearance within normal limits, vocal cords within normal limits, no lesions present  o Nasopharyngoscopy  o : The patients nares were anesthetized with Lidocaine with Afrin. After this was done, the flexible nasopharyngoscope was passed through both the left and right sides. The nasal  cavities free of any lesions, polyps, or purulence. The nasopharynx is normal-appearing without any evidence of masses.   · Neck  o Inspection/Palpation  o : normal appearance, no masses or tenderness, trachea midline; thyroid size normal, nontender, no nodules or masses present on palpation  · Respiratory  o Respiratory Effort  o : breathing unlabored  o Inspection of Chest  o : normal appearance, no retractions  · Cardiovascular  o Heart  o : regular rate and rhythm  · Lymphatic  o Neck  o : no lymphadenopathy present  o Supraclavicular Nodes  o : no lymphadenopathy present  o Preauricular Nodes  o : no lymphadenopathy present  · Skin and Subcutaneous Tissue  o General Inspection  o : Regarding face and neck - there are no rashes present, no lesions present, and no areas of discoloration  · Neurologic  o Cranial Nerves  o : cranial nerves II-XII are grossly intact bilaterally  o Gait and Station  o : normal gait, able to stand without diffculty  · Psychiatric  o Judgement and Insight  o : judgment and insight intact  o Mood and Affect  o : mood normal, affect appropriate          Assessment  · Cerumen impaction     380.4/H61.20  · Eustachian tube dysfunction     381.81/H69.80  · Hearing loss     389.9/H91.90  · Otitis media     382.9/H66.90      Plan  · Orders  o Nasal endoscopy [facility] ([id number]) - 381.81/H69.80, 389.9/H91.90, 382.9/H66.90 - 12/08/2020  o Cerumen Removal by Provider, Unilateral [facility] ([id number]) - 380.4/H61.20, 389.9/H91.90, 382.9/H66.90 - 12/08/2020  · Medications  o Ciprodex 0.3-0.1 % otic (ear) drops,suspension   SIG: Use 3 drops to both ears 3 times per day for 7 days   DISP: (1) Bottle with 0 refills  Prescribed on 12/08/2020     o Medications have been Reconciled  o Transition of Care or Provider Policy  · Instructions  o He presents the clinic today for follow-up regarding issues with his ears and bilateral ear infections that was diagnosed at the last clinic visit. He had thick mucoid effusions, and  PE tubes were placed at that time. He notes that his hearing has been much better after the tubes were placed, but he has had significant drainage which is purulent out of both of his ears. He denies any ear pain or pressure. He did have an audiogram today which shows mild hearing loss rising to normal hearing the right ear downsloping to moderate sensorineural hearing loss with noise notch type pattern. The left ear is significantly weaker than the right. Tympanograms suggest patent and functioning PE tubes. Word discrimination scores are 100% on the right and 60% in the left. He informing today that he has had asymmetric hearing for over 25 years with the left ear being weaker. He has been keeping his ears dry in general. On exam, there was still some cerumen impaction and purulent drainage bilaterally which was cleared out. I did perform a nasolaryngoscopy to rule out nasopharyngeal mass, the exam was normal. Ciprodex drops were infused. I will prescribe drops for him to use for the next 7 days. I will plan to see him back in the clinic in 2 or 3 months to reassess the ears.  o Electronically Identified Patient Education Materials Provided Electronically  · Correspondence  o ENT Letter to Referring MD (Niels Velasco MD) - 12/08/2020            Electronically Signed by: Teo Lamb MD -Author on December 8, 2020 01:15:49 PM

## 2021-05-14 VITALS — WEIGHT: 262.12 LBS | TEMPERATURE: 97.4 F | HEIGHT: 68 IN | BODY MASS INDEX: 39.73 KG/M2

## 2021-05-14 VITALS — HEIGHT: 68 IN | WEIGHT: 252.5 LBS | BODY MASS INDEX: 38.27 KG/M2 | TEMPERATURE: 97.3 F

## 2021-05-14 VITALS
SYSTOLIC BLOOD PRESSURE: 140 MMHG | OXYGEN SATURATION: 97 % | DIASTOLIC BLOOD PRESSURE: 88 MMHG | HEART RATE: 78 BPM | WEIGHT: 258 LBS | HEIGHT: 68 IN | BODY MASS INDEX: 39.1 KG/M2 | TEMPERATURE: 97.8 F

## 2021-05-14 VITALS
WEIGHT: 263 LBS | BODY MASS INDEX: 39.86 KG/M2 | SYSTOLIC BLOOD PRESSURE: 150 MMHG | DIASTOLIC BLOOD PRESSURE: 100 MMHG | TEMPERATURE: 98.3 F | HEART RATE: 93 BPM | OXYGEN SATURATION: 97 % | HEIGHT: 68 IN

## 2021-05-14 VITALS — BODY MASS INDEX: 40.33 KG/M2 | TEMPERATURE: 97.5 F | HEIGHT: 68 IN | WEIGHT: 266.12 LBS

## 2021-05-14 VITALS — TEMPERATURE: 97.8 F | BODY MASS INDEX: 37.89 KG/M2 | HEIGHT: 68 IN | WEIGHT: 250 LBS

## 2021-05-14 NOTE — PROGRESS NOTES
Progress Note      Patient Name: Yousuf Em   Patient ID: [id number]   Sex: Male   YOB: 1970    Primary Care Provider: Angela Smith MD   Referring Provider: Niels Velasco MD    Visit Date: April 15, 2021    Provider: Teo Lamb MD   [place] [facility]   Location Address: [address] [place] [address], [address]  [id number]   Location Phone: [phone]          Chief Complaint     1.  Decreased hearing left ear    2.  Eustachian tube dysfunction    3.  Ear itching       History Of Present Illness  Yousuf Em is a 50 year old /White male who presents to the office today for a follow-up visit.      He presents the clinic today for follow-up regarding recent decreased hearing in the left ear, as well as ear itching issues.  The patient does have eustachian tube dysfunction and PE tubes were placed in the office for this due to chronic mucoid effusions.  He noted significant improvement in his hearing, does have a sensorineural hearing loss as well.  He notes recently his left ear has been decreased in hearing, and there is ear itching bilaterally.  He has had some drainage which she describes as mostly clear.       Past Medical History  Allergic rhinitis; Arthritis; Colitis; Depression; ETD (eustachian tube dysfunction); Forgetfulness; Hearing loss; High cholesterol; Hyperlipemia; Hypertension; Labral tear of long head of biceps tendon, left, initial encounter; Left shoulder labral tear ; Right lateral epicondylitis; Seasonal allergies; Thyroid disorder         Past Surgical History  Cholecystectomy; Colonoscopy; Gallbladder; Joint Surgery         Medication List  atorvastatin 20 mg oral tablet; carvedilol 6.25 mg oral tablet; chlorthalidone 25 mg oral tablet; levothyroxine 75 mcg oral tablet; losartan 50 mg oral tablet; Pristiq 50 mg oral tablet extended release 24 hr         Allergy List  NO KNOWN DRUG ALLERGIES         Family Medical History  Family  "history of Arthritis; Family history of cancer; Family history of stroke; Family history of heart disease; Family history of diabetes mellitus         Social History  Alcohol Use (Current some day); .; lives with children; lives with other; lives with spouse; .; Recreational Drug Use (Current some day); Single.; Tobacco (Never); Working         Immunizations  Name Date Admin   Influenza 02/01/2021   Shingles 02/01/2021         Review of Systems  · Constitutional  o Denies  o : fever, night sweats, weight loss  · Eyes  o Denies  o : discharge from eye, impaired vision  · HENT  o Admits  o : *See HPI  · Cardiovascular  o Denies  o : chest pain, irregular heart beats  · Respiratory  o Denies  o : shortness of breath, wheezing, coughing up blood  · Gastrointestinal  o Denies  o : heartburn, reflux, vomiting blood  · Genitourinary  o Denies  o : frequency  · Integument  o Denies  o : rash, skin dryness  · Neurologic  o Denies  o : seizures, loss of balance, loss of consciousness, dizziness  · Endocrine  o Denies  o : cold intolerance, heat intolerance  · Heme-Lymph  o Denies  o : easy bleeding, anemia      Vitals  Date Time BP Position Site L\R Cuff Size HR RR TEMP (F) WT  HT  BMI kg/m2 BSA m2 O2 Sat FR L/min FiO2 HC       04/15/2021 10:23 AM        97.5 266lbs 2oz 5'  8\" 40.46 2.41             Physical Examination  · Constitutional  o Appearance  o : well developed, well-nourished, alert and in no acute distress, voice clear and strong  · Head and Face  o Head  o :   § Inspection  § : no deformities or lesions  o Face  o :   § Inspection  § : No facial lesions; House-Brackmann I/VI bilaterally  § Palpation  § : No TMJ crepitus nor  muscle tenderness bilaterally  · Eyes  o Vision  o :   § Visual Fields  § : Extraocular movements are intact. No spontaneous or gaze-induced nystagmus.  o Conjunctivae  o : clear  o Sclerae  o : clear  o Pupils and Irises  o : pupils equal, round, and reactive to " light.   · Ears, Nose, Mouth and Throat  o Ears  o :   § External Ears  § : appearance within normal limits, no lesions present  § Otoscopic Examination  § : Cerumen impactions bilaterally removed with microscope and instruments, blocked PE tube on the left, was able to be cleared and Alves needle, Ciprodex drops were infused, nystatin ointment applied to the external auditory canals which appear inflamed, tympanic membrane appearance within normal limits bilaterally, right PE tube patent and functioning, well-aerated middle ears  § Hearing  § : intact to conversational voice both ears  o Nose  o :   § External Nose  § : appearance normal  § Intranasal Exam  § : mucosa within normal limits, vestibules normal, no intranasal lesions present, septum midline, sinuses non tender to percussion  o Oral Cavity  o :   § Oral Mucosa  § : oral mucosa normal without pallor or cyanosis  § Lips  § : lip appearance normal  § Teeth  § : normal dentition for age  § Gums  § : gums pink, non-swollen, no bleeding present  § Tongue  § : tongue appearance normal; normal mobility  § Palate  § : hard palate normal, soft palate appearance normal with symmetric mobility  o Throat  o :   § Oropharynx  § : no inflammation or lesions present, tonsils within normal limits  § Hypopharynx  § : appearance within normal limits, superior epiglottis within normal limits  § Larynx  § : appearance within normal limits, vocal cords within normal limits, no lesions present  · Neck  o Inspection/Palpation  o : normal appearance, no masses or tenderness, trachea midline; thyroid size normal, nontender, no nodules or masses present on palpation  · Respiratory  o Respiratory Effort  o : breathing unlabored  o Inspection of Chest  o : normal appearance, no retractions  · Cardiovascular  o Heart  o : regular rate and rhythm  · Lymphatic  o Neck  o : no lymphadenopathy present  o Supraclavicular Nodes  o : no lymphadenopathy present  o Preauricular Nodes  o : no  lymphadenopathy present  · Skin and Subcutaneous Tissue  o General Inspection  o : Regarding face and neck - there are no rashes present, no lesions present, and no areas of discoloration  · Neurologic  o Cranial Nerves  o : cranial nerves II-XII are grossly intact bilaterally  o Gait and Station  o : normal gait, able to stand without diffculty  · Psychiatric  o Judgement and Insight  o : judgment and insight intact  o Mood and Affect  o : mood normal, affect appropriate          Assessment  · Cerumen impaction     380.4/H61.20  · Eustachian tube dysfunction     381.81/H69.80  · Hearing loss     389.9/H91.90  · Ear itch     698.9/L29.9      Plan  · Orders  o Cerumen Removal by Provider, Unilateral [facility] ([id number]) - 380.4/H61.20 - 04/15/2021  · Medications  o ciprofloxacin HCl 0.3 % ophthalmic (eye) drops   SIG: 3 drops to left ear TID for 5 days   DISP: (1) Bottle with 0 refills  Prescribed on 04/15/2021     o betamethasone, augmented 0.05 % topical ointment   SIG: apply to affected part of ear and ear canals BID   DISP: (1) Tube with 0 refills  Prescribed on 04/15/2021     o Medications have been Reconciled  o Transition of Care or Provider Policy  · Instructions  o He presents the clinic today for follow-up regarding recent decreased hearing in the left ear, as well as ear itching issues. The patient does have eustachian tube dysfunction and PE tubes were placed in the office for this due to chronic mucoid effusions. He noted significant improvement in his hearing, does have a sensorineural hearing loss as well. He notes recently his left ear has been decreased in hearing, and there is ear itching bilaterally. He has had some drainage which she describes as mostly clear. Examination today reveals cerumen impaction and blocked PE tube on the left which I was able to clear. Ciprodex drops were infused. I will have him continue drops at home. I also use nystatin ointment along the ear canal, and will have him continue  with betamethasone propionate ointment for the next 10 days to help with the ear itching issues. I will plan to see him back in the clinic as previously planned in 5 months, or sooner should he have any issues.  o Electronically Identified Patient Education Materials Provided Electronically  · Correspondence  o ENT Letter to Referring MD (Niels Velasco MD) - 04/15/2021            Electronically Signed by: Teo Lamb MD -Author on April 15, 2021 11:10:49 AM

## 2021-05-14 NOTE — PROGRESS NOTES
Progress Note      Patient Name: Yousuf Em   Patient ID: [id number]   Sex: Male   YOB: 1970    Primary Care Provider: Angela Smith MD   Referring Provider: Niels Velasco MD    Visit Date: March 9, 2021    Provider: Teo Lamb MD   Location: [facility]   Location Address: [address] [place] [address], [address]  [id number]   Location Phone: [phone]          Chief Complaint     1.  Eustachian tube dysfunction    2.  Hearing loss, left ear       History Of Present Illness  Yousuf Em is a 50 year old /White male who presents to the office today for a follow-up visit.      He presents the clinic today for follow-up regarding otitis media, ear drainage, and hearing loss.  I last saw him in December, and at that point he did have an ear infection and was treated with Ciprodex drops.  He notes doing much better, and states that his hearing is essentially back to normal.  He still has intermittent muffled hearing on the left side mostly.  He notes no ear drainage.       Past Medical History  Allergic rhinitis; Arthritis; Colitis; Depression; ETD (eustachian tube dysfunction); Forgetfulness; Hearing loss; High cholesterol; Hyperlipemia; Hypertension; Labral tear of long head of biceps tendon, left, initial encounter; Left shoulder labral tear ; Right lateral epicondylitis; Seasonal allergies; Thyroid disorder         Past Surgical History  Cholecystectomy; Colonoscopy; Gallbladder; Joint Surgery         Medication List  atorvastatin 20 mg oral tablet; carvedilol 6.25 mg oral tablet; levothyroxine 75 mcg oral tablet; lisinopril-hydrochlorothiazide 20-25 mg oral tablet; venlafaxine 150 mg oral capsule,extended release 24hr         Allergy List  NO KNOWN DRUG ALLERGIES         Family Medical History  Family history of Arthritis; Family history of cancer; Family history of stroke; Family history of heart disease; Family history of diabetes mellitus         Social  "History  Alcohol Use (Current some day); .; lives with children; lives with other; lives with spouse; .; Recreational Drug Use (Current some day); Single.; Tobacco (Never); Working         Immunizations  Name Date Admin   Influenza 02/01/2021   Shingles 02/01/2021         Review of Systems  · Constitutional  o Denies  o : fever, night sweats, weight loss  · Eyes  o Denies  o : discharge from eye, impaired vision  · HENT  o Admits  o : *See HPI  · Cardiovascular  o Denies  o : chest pain, irregular heart beats  · Respiratory  o Denies  o : shortness of breath, wheezing, coughing up blood  · Gastrointestinal  o Denies  o : heartburn, reflux, vomiting blood  · Genitourinary  o Denies  o : frequency  · Integument  o Denies  o : rash, skin dryness  · Neurologic  o Denies  o : seizures, loss of balance, loss of consciousness, dizziness  · Endocrine  o Denies  o : cold intolerance, heat intolerance  · Heme-Lymph  o Denies  o : easy bleeding, anemia      Vitals  Date Time BP Position Site L\R Cuff Size HR RR TEMP (F) WT  HT  BMI kg/m2 BSA m2 O2 Sat FR L/min FiO2 HC       03/09/2021 09:36 AM        97.4 262lbs 2oz 5'  8\" 39.86 2.39             Physical Examination  · Constitutional  o Appearance  o : well developed, well-nourished, alert and in no acute distress, voice clear and strong  · Head and Face  o Head  o :   § Inspection  § : no deformities or lesions  o Face  o :   § Inspection  § : No facial lesions; House-Brackmann I/VI bilaterally  § Palpation  § : No TMJ crepitus nor  muscle tenderness bilaterally  · Eyes  o Vision  o :   § Visual Fields  § : Extraocular movements are intact. No spontaneous or gaze-induced nystagmus.  o Conjunctivae  o : clear  o Sclerae  o : clear  o Pupils and Irises  o : pupils equal, round, and reactive to light.   · Ears, Nose, Mouth and Throat  o Ears  o :   § External Ears  § : appearance within normal limits, no lesions present  § Otoscopic Examination  § : PE " tubes patent and functioning, cerumen and mucoid fluid on the left side, suctioned, tympanic membrane appearance within normal limits bilaterally without perforations, well-aerated middle ears  § Hearing  § : intact to conversational voice both ears  o Nose  o :   § External Nose  § : appearance normal  § Intranasal Exam  § : mucosa within normal limits, vestibules normal, no intranasal lesions present, septum midline, sinuses non tender to percussion  o Oral Cavity  o :   § Oral Mucosa  § : oral mucosa normal without pallor or cyanosis  § Lips  § : lip appearance normal  § Teeth  § : normal dentition for age  § Gums  § : gums pink, non-swollen, no bleeding present  § Tongue  § : tongue appearance normal; normal mobility  § Palate  § : hard palate normal, soft palate appearance normal with symmetric mobility  o Throat  o :   § Oropharynx  § : no inflammation or lesions present, tonsils within normal limits  § Hypopharynx  § : appearance within normal limits, superior epiglottis within normal limits  § Larynx  § : appearance within normal limits, vocal cords within normal limits, no lesions present  · Neck  o Inspection/Palpation  o : normal appearance, no masses or tenderness, trachea midline; thyroid size normal, nontender, no nodules or masses present on palpation  · Respiratory  o Respiratory Effort  o : breathing unlabored  o Inspection of Chest  o : normal appearance, no retractions  · Cardiovascular  o Heart  o : regular rate and rhythm  · Lymphatic  o Neck  o : no lymphadenopathy present  o Supraclavicular Nodes  o : no lymphadenopathy present  o Preauricular Nodes  o : no lymphadenopathy present  · Skin and Subcutaneous Tissue  o General Inspection  o : Regarding face and neck - there are no rashes present, no lesions present, and no areas of discoloration  · Neurologic  o Cranial Nerves  o : cranial nerves II-XII are grossly intact bilaterally  o Gait and Station  o : normal gait, able to stand without  diffculty  · Psychiatric  o Judgement and Insight  o : judgment and insight intact  o Mood and Affect  o : mood normal, affect appropriate          Assessment  · Cerumen impaction     380.4/H61.20  · Eustachian tube dysfunction     381.81/H69.80  · Hearing loss     389.9/H91.90      Plan  · Orders  o Cerumen Removal by Provider, Unilateral [facility] ([id number]) - 380.4/H61.20, 389.9/H91.90 - 03/09/2021  · Medications  o Medications have been Reconciled  o Transition of Care or Provider Policy  · Instructions  o He presents the clinic today for follow-up regarding otitis media, ear drainage, and hearing loss. I last saw him in December, and at that point he did have an ear infection and was treated with Ciprodex drops. He notes doing much better, and states that his hearing is essentially back to normal. He still has intermittent muffled hearing on the left side mostly. He notes no ear drainage. On examination today the right ear looks normal with a well functioning PE tube. Left ear did have some cerumen and mucoid drainage which was cleaned out. Ciprodex drops were infused. I will plan on seeing him back in the clinic in 6 months, or sooner should there be any issues.  o Electronically Identified Patient Education Materials Provided Electronically  · Correspondence  o ENT Letter to Referring MD (Niels Velasco MD) - 03/09/2021            Electronically Signed by: Teo Lamb MD -Author on March 9, 2021 03:40:01 PM

## 2021-05-14 NOTE — PROGRESS NOTES
"   Progress Note      Patient Name: Yousuf Em   Patient ID: [id number]   Sex: Male   YOB: 1970    Primary Care Provider: Angela Smith MD   Referring Provider: Niels Velasco MD    Visit Date: March 19, 2021    Provider: Angela Smith MD   Location: [facility]   Location Address: [address] [place] [address]  [id number]   Location Phone: [phone]          Chief Complaint     \"Follow-up for stress\"       History Of Present Illness  Yousuf Em is a 50 year old /White male who presents for evaluation and treatment of:      Chronic issues.    Pt has tried and failed the following medications for depression: paxil, prozac and lexapro  Was unable to get Trintellix due to cost  Denies SI    BP readings: 147/96, 145/90, 165/74, 165/74, 142/94, 134/85, 138/82, 156/92, 147/88, 158/96  No chest pain  No trouble breathing    has had colonoscopy previous, had several polyps  scheduled for colonscopy in August       Past Medical History  Disease Name Date Onset Notes   Allergic rhinitis --  --    Arthritis --  --    Colitis --  --    Depression --  --    ETD (eustachian tube dysfunction) --  --    Forgetfulness --  --    Hearing loss --  --    High cholesterol --  --    Hyperlipemia --  --    Hypertension --  --    Labral tear of long head of biceps tendon, left, initial encounter 09/30/2015 --    Left shoulder labral tear  09/25/2015 --    Right lateral epicondylitis 10/21/2016 --    Seasonal allergies --  --    Thyroid disorder --  --          Past Surgical History  Procedure Name Date Notes   Cholecystectomy 2000 --    Colonoscopy --  --    Gallbladder --  --    Joint Surgery --  --          Medication List  Name Date Started Instructions   atorvastatin 20 mg oral tablet  take 1 tablet (20 mg) by oral route once daily   carvedilol 6.25 mg oral tablet  take 1 tablet (6.25 mg) by oral route 2 times per day with food   levothyroxine 75 mcg oral tablet  take 1 " "tablet (75 mcg) by oral route once daily         Allergy List  Allergen Name Date Reaction Notes   NO KNOWN DRUG ALLERGIES --  --  --        Allergies Reconciled  Family Medical History  Disease Name Relative/Age Notes   Family history of Arthritis Mother/   Mother   Family history of cancer Mother/   --    Family history of stroke Grandmother (maternal)/   --    Family history of heart disease Father/  Mother/  Sister/   --    Family history of diabetes mellitus Mother/   --          Social History  Finding Status Start/Stop Quantity Notes   Alcohol Use Current some day --/-- --  rarely drinks, less than 1 drink per day, has been drinking for 11-20 years   . --  --/-- --  --    lives with children --  --/-- --  --    lives with other --  --/-- --  --    lives with spouse --  --/-- --  --    . --  --/-- --  --    Recreational Drug Use Current some day --/-- --  yes  no   Single. --  --/-- --  --    Tobacco Never --/-- --  never smoker   Working --  --/-- --  --          Immunizations  NameDate Admin Mfg Trade Name Lot Number Route Inj VIS Given VIS Publication   Fgwvuopdc68/01/2021 SKB Fluarix, quadrivalent, preservative free [id number] NE NE 02/08/2021    Comments:    Sobclnst34/01/2021 MSD ZOSTAVAX  NE NE 02/08/2021    Comments:          Vitals  Date Time BP Position Site L\R Cuff Size HR RR TEMP (F) WT  HT  BMI kg/m2 BSA m2 O2 Sat FR L/min FiO2 HC       12/08/2020 10:55 AM        97.8 250lbs 0oz 5'  8\" 38.01 2.33       02/08/2021 02:10 /88 Sitting    78 - R  97.8 258lbs 0oz 5'  8\" 39.23 2.37 97 %  21%    03/09/2021 09:36 AM        97.4 262lbs 2oz 5'  8\" 39.86 2.39       03/19/2021 04:02 /100 Sitting    93 - R  98.3 263lbs 0oz 5'  8\" 39.99 2.39 97 %  21%          Physical Examination  · Constitutional  o Appearance  o : no acute distress, well-nourished  · Head and Face  o Head  o :   § Inspection  § : atraumatic, normocephalic  · Eyes  o Eyes  o : extraocular movements intact, no scleral " icterus, no conjunctival injection  · Respiratory  o Respiratory Effort  o : breathing comfortably, symmetric chest rise  o Auscultation of Lungs  o : clear to asculatation bilaterally, no wheezes, rales, or rhonchii  · Cardiovascular  o Heart  o :   § Auscultation of Heart  § : regular rate and rhythm, no murmurs, rubs, or gallops  o Peripheral Vascular System  o :   § Extremities  § : no edema  · Neurologic  o Mental Status Examination  o :   § Orientation  § : grossly oriented to person, place and time  o Gait and Station  o :   § Gait Screening  § : normal gait  · Psychiatric  o General  o : normal mood and affect          Assessment  · Hypertension     401.9/I10  · Major depressive disorder     296.20/F32.2       Will try Pristiq  Warned of side effects  Will try blood pressure medications  Warned of side effects  He will continue to trend blood pressure readings     Problems Reconciled  Plan  · Orders  o ACO-39: Current medications updated and reviewed (1159F, ) - - 03/19/2021  · Medications  o Pristiq 50 mg oral tablet extended release 24 hr   SIG: take 1 tablet (50 mg) by oral route once daily   DISP: (90) Tablet with 0 refills  Prescribed on 03/19/2021     o losartan 50 mg oral tablet   SIG: take 2 tablets (100 mg) by oral route once daily   DISP: (60) Tablet with 0 refills  Prescribed on 03/19/2021     o chlorthalidone 25 mg oral tablet   SIG: take 1 tablet (25 mg) by oral route once daily   DISP: (90) Tablet with 0 refills  Prescribed on 03/19/2021     o lisinopril-hydrochlorothiazide 20-25 mg oral tablet   SIG: take 2 tablets by oral route once daily   DISP: (0) Tablet with 0 refills  Discontinued on 03/19/2021     o venlafaxine 150 mg oral tablet extended release 24hr   SIG: Take 1 tablet by mouth twice daily   DISP: (0) Tablet with 0 refills  Discontinued on 03/19/2021     o Medications have been Reconciled  o Transition of Care or Provider Policy  · Instructions  o Patient was educated/instructed  on their diagnosis, treatment and medications prior to discharge from the clinic today.  · Disposition  o 6 Week Follow Up            Electronically Signed by: Angela Smith MD -Author on March 31, 2021 07:20:34 AM

## 2021-05-15 VITALS — HEART RATE: 113 BPM | HEIGHT: 68 IN | WEIGHT: 235 LBS | BODY MASS INDEX: 35.61 KG/M2 | OXYGEN SATURATION: 96 %

## 2021-06-05 NOTE — PROGRESS NOTES
"   Progress Note      Patient Name: Yousuf Em   Patient ID: [id number]   Sex: Male   YOB: 1970    Primary Care Provider: Angela Smith MD   Referring Provider: Niels Velasco MD    Visit Date: May 5, 2021    Provider: Angela Smith MD   Location: [facility]   Location Address: [address] [place] [address]  [id number]   Location Phone: [phone]          Chief Complaint  · \"I need my thyroid med refilled\"  · \"I need to talk about my antidepression meds\"  · \"I need to talk about my blood pressure\"      History Of Present Illness  Yousuf Em is a 50 year old /White male who presents for evaluation and treatment of:      depression -   says he has mental clarity and better focus  however, feels like he is more compulsive/sensitive/agitated  still having issues with sleep  Denies SI    blood pressure -   been taking his blood pressure in the evenings, randomly throughout the week  139/84, 148/90, 142/88, 153/91 at home  has tried amlodipine in the past, thinks it caused his heart to race   no headaches     no chest pain  no trouble breathing       Past Medical History  Disease Name Date Onset Notes   Allergic rhinitis --  --    Arthritis --  --    Colitis --  --    Depression --  --    ETD (eustachian tube dysfunction) --  --    Forgetfulness --  --    Hearing loss --  --    High cholesterol --  --    Hyperlipemia --  --    Hypertension --  --    Labral tear of long head of biceps tendon, left, initial encounter 09/30/2015 --    Left shoulder labral tear  09/25/2015 --    Right lateral epicondylitis 10/21/2016 --    Seasonal allergies --  --    Thyroid disorder --  --          Past Surgical History  Procedure Name Date Notes   Cholecystectomy 2000 --    Colonoscopy --  --    Gallbladder --  --    Joint Surgery --  --          Medication List  Name Date Started Instructions   atorvastatin 20 mg oral tablet  take 1 tablet (20 mg) by oral route once " daily   betamethasone, augmented 0.05 % topical ointment 04/15/2021 apply to affected part of ear and ear canals BID   carvedilol 6.25 mg oral tablet 04/14/2021 take 1 tablet (6.25 mg) by oral route 2 times per day with food   chlorthalidone 25 mg oral tablet 03/19/2021 take 1 tablet (25 mg) by oral route once daily   levothyroxine 75 mcg oral tablet 05/05/2021 take 1 tablet (75 mcg) by oral route once daily for 90 days   losartan 50 mg oral tablet 04/19/2021 TAKE TWO TABLETS BY MOUTH DAILY   Pristiq 100 mg oral tablet extended release 24 hr 05/05/2021 take 1 tablet (100 mg) by oral route once daily         Allergy List  Allergen Name Date Reaction Notes   NO KNOWN DRUG ALLERGIES --  --  --        Allergies Reconciled  Family Medical History  Disease Name Relative/Age Notes   Family history of Arthritis Mother/   Mother   Family history of cancer Mother/   --    Family history of stroke Grandmother (maternal)/   --    Family history of heart disease Father/  Mother/  Sister/   --    Family history of diabetes mellitus Mother/   --          Social History  Finding Status Start/Stop Quantity Notes   Alcohol Use Current some day --/-- --  rarely drinks, less than 1 drink per day, has been drinking for 11-20 years   . --  --/-- --  --    lives with children --  --/-- --  --    lives with other --  --/-- --  --    lives with spouse --  --/-- --  --    . --  --/-- --  --    Recreational Drug Use Current some day --/-- --  yes  no   Single. --  --/-- --  --    Tobacco Never --/-- --  never smoker   Working --  --/-- --  --          Immunizations  NameDate Admin Mfg Trade Name Lot Number Route Inj VIS Given VIS Publication   Tixaugcft77/01/2021 SKB Fluarix, quadrivalent, preservative free [id number] NE NE 02/08/2021    Comments:    Mwzezcyl94/01/2021 MSD ZOSTAVAX  NE NE 02/08/2021    Comments:          Vitals  Date Time BP Position Site L\R Cuff Size HR RR TEMP (F) WT  HT  BMI kg/m2 BSA m2 O2 Sat FR L/min FiO2 HC  "      03/19/2021 04:02 /100 Sitting    93 - R  98.3 263lbs 0oz 5'  8\" 39.99 2.39 97 %  21%    04/15/2021 10:23 AM        97.5 266lbs 2oz 5'  8\" 40.46 2.41       05/05/2021 03:45 /92 Sitting    84 - R  97.7 266lbs 0oz 5'  8\" 40.44 2.41 96 %  21%          Physical Examination  · Constitutional  o Appearance  o : obese, well developed  · Head and Face  o Head  o :   § Inspection  § : atraumatic, normocephalic  · Eyes  o Eyes  o : extraocular movements intact, no scleral icterus, no conjunctival injection  · Respiratory  o Respiratory Effort  o : breathing comfortably, symmetric chest rise  o Auscultation of Lungs  o : clear to asculatation bilaterally, no wheezes, rales, or rhonchii  · Cardiovascular  o Heart  o :   § Auscultation of Heart  § : regular rate and rhythm, no murmurs, rubs, or gallops  o Peripheral Vascular System  o :   § Extremities  § : no edema  · Neurologic  o Mental Status Examination  o :   § Orientation  § : grossly oriented to person, place and time  o Gait and Station  o :   § Gait Screening  § : normal gait  · Psychiatric  o General  o : normal mood and affect          Assessment  · Depression     311/F32.9  will increase pristiq to 100mg this visit  · Hypertension     401.9/I10  · Thyroid disorder     246.9/E07.9  · Essential hypertension     401.9/I10  will adjust amlodipine     discussed side effects and what to monitor  with bp and stress     Problems Reconciled  Plan  · Orders  o ACO-39: Current medications updated and reviewed (, 1159F) - - 05/05/2021  · Medications  o amlodipine 5 mg oral tablet   SIG: take 1 tablet (5 mg) by oral route once daily for 90 days   DISP: (90) Tablet with 0 refills  Prescribed on 05/05/2021     o Pristiq 100 mg oral tablet extended release 24 hr   SIG: take 1 tablet (100 mg) by oral route once daily   DISP: (30) Tablet with 0 refills  Adjusted on 05/05/2021     o Medications have been Reconciled  o Transition of Care or Provider " Policy  · Instructions  o Patient was educated/instructed on their diagnosis, treatment and medications prior to discharge from the clinic today.  · Disposition  o 1 Month Follow Up            Electronically Signed by: Angela Smith MD -Author on May 28, 2021 10:55:07 AM

## 2021-06-09 ENCOUNTER — OFFICE VISIT (OUTPATIENT)
Dept: INTERNAL MEDICINE | Facility: CLINIC | Age: 51
End: 2021-06-09

## 2021-06-09 VITALS
SYSTOLIC BLOOD PRESSURE: 124 MMHG | OXYGEN SATURATION: 95 % | WEIGHT: 268.8 LBS | HEART RATE: 76 BPM | BODY MASS INDEX: 40.74 KG/M2 | HEIGHT: 68 IN | TEMPERATURE: 97.8 F | DIASTOLIC BLOOD PRESSURE: 84 MMHG

## 2021-06-09 DIAGNOSIS — H92.12 EAR DRAINAGE, LEFT: ICD-10-CM

## 2021-06-09 DIAGNOSIS — F33.42 RECURRENT MAJOR DEPRESSIVE DISORDER, IN FULL REMISSION (HCC): ICD-10-CM

## 2021-06-09 DIAGNOSIS — I10 HYPERTENSION, UNSPECIFIED TYPE: ICD-10-CM

## 2021-06-09 DIAGNOSIS — E03.9 HYPOTHYROIDISM, UNSPECIFIED TYPE: ICD-10-CM

## 2021-06-09 DIAGNOSIS — E78.5 HYPERLIPIDEMIA, UNSPECIFIED HYPERLIPIDEMIA TYPE: Primary | ICD-10-CM

## 2021-06-09 DIAGNOSIS — J30.2 SEASONAL ALLERGIC RHINITIS, UNSPECIFIED TRIGGER: ICD-10-CM

## 2021-06-09 PROBLEM — M19.90 ARTHRITIS: Status: ACTIVE | Noted: 2021-06-09

## 2021-06-09 PROBLEM — H91.90 HEARING LOSS: Status: ACTIVE | Noted: 2021-06-09

## 2021-06-09 PROBLEM — H69.90 ETD (EUSTACHIAN TUBE DYSFUNCTION): Status: ACTIVE | Noted: 2021-06-09

## 2021-06-09 PROBLEM — J30.9 ALLERGIC RHINITIS: Status: RESOLVED | Noted: 2021-06-09 | Resolved: 2021-06-09

## 2021-06-09 PROBLEM — H69.80 ETD (EUSTACHIAN TUBE DYSFUNCTION): Status: ACTIVE | Noted: 2021-06-09

## 2021-06-09 PROBLEM — K52.3 INDETERMINATE COLITIS: Status: ACTIVE | Noted: 2021-06-09

## 2021-06-09 PROBLEM — J30.9 ALLERGIC RHINITIS: Status: ACTIVE | Noted: 2021-06-09

## 2021-06-09 PROBLEM — F32.A DEPRESSION: Status: ACTIVE | Noted: 2021-06-09

## 2021-06-09 PROCEDURE — 99214 OFFICE O/P EST MOD 30 MIN: CPT | Performed by: INTERNAL MEDICINE

## 2021-06-09 RX ORDER — CHLORTHALIDONE 25 MG/1
TABLET ORAL
COMMUNITY
Start: 2021-03-19 | End: 2021-06-17

## 2021-06-09 RX ORDER — CARVEDILOL 6.25 MG/1
TABLET ORAL
COMMUNITY
Start: 2021-04-14 | End: 2021-07-26 | Stop reason: SDUPTHER

## 2021-06-09 RX ORDER — DESVENLAFAXINE 100 MG/1
TABLET, EXTENDED RELEASE ORAL
COMMUNITY
Start: 2021-06-01 | End: 2021-06-17 | Stop reason: SDUPTHER

## 2021-06-09 RX ORDER — ATORVASTATIN CALCIUM 20 MG/1
TABLET, FILM COATED ORAL
COMMUNITY
End: 2021-06-22 | Stop reason: SDUPTHER

## 2021-06-09 RX ORDER — FEXOFENADINE HCL 180 MG/1
180 TABLET ORAL DAILY
COMMUNITY
End: 2022-11-18

## 2021-06-09 RX ORDER — LEVOTHYROXINE SODIUM 0.07 MG/1
TABLET ORAL
COMMUNITY
Start: 2021-05-05 | End: 2021-06-22 | Stop reason: SDUPTHER

## 2021-06-09 RX ORDER — LOSARTAN POTASSIUM 50 MG/1
TABLET ORAL
COMMUNITY
Start: 2021-05-15 | End: 2021-06-21

## 2021-06-09 RX ORDER — BETAMETHASONE DIPROPIONATE 0.5 MG/G
OINTMENT TOPICAL
COMMUNITY
Start: 2021-04-15 | End: 2022-05-04

## 2021-06-09 NOTE — PROGRESS NOTES
"Chief Complaint  Med Refill    Subjective          Yousuf Em presents to [facility]  History of Present Illness    HTN-  Hasn't been checking at home  No chest pain  No vision changes or floaters or sparkles in eyes like he has before  No head    Depression-  Doing great on current meds  No longer with erectile dysfunction  irritability is doing much better    Left ear drainage -  Has tubes in his left ear  Recently noticed drainage - yellow/green and \"snot-like\" consistency he reports  Follows with Dr. Lamb for this    Objective   Vital Signs:   /84   Pulse 76   Temp 97.8 °F (36.6 °C)   Ht 172.7 cm (68\")   Wt 122 kg (268 lb 12.8 oz)   SpO2 95%   BMI 40.87 kg/m²     Physical Exam  Vitals reviewed.   Constitutional:       Appearance: Normal appearance. He is well-developed. He is obese.   HENT:      Head: Normocephalic and atraumatic.      Right Ear: External ear normal.      Left Ear: External ear normal.      Ears:      Comments: Left ear with serous drainage  Bilateral tubes in place     Mouth/Throat:      Pharynx: No oropharyngeal exudate.   Eyes:      Conjunctiva/sclera: Conjunctivae normal.      Pupils: Pupils are equal, round, and reactive to light.   Cardiovascular:      Rate and Rhythm: Normal rate and regular rhythm.      Heart sounds: No murmur heard.   No friction rub. No gallop.    Pulmonary:      Effort: Pulmonary effort is normal.      Breath sounds: Normal breath sounds. No wheezing or rhonchi.   Skin:     General: Skin is warm and dry.   Neurological:      Mental Status: He is alert and oriented to person, place, and time.      Cranial Nerves: No cranial nerve deficit.   Psychiatric:         Mood and Affect: Affect normal.         Behavior: Behavior normal.         Thought Content: Thought content normal.        Result Review :   The following data was reviewed by: Angela Smith MD on 06/09/2021:  Common labs    Common Labsle " 2/8/21 2/8/21    1524 1524   Glucose  83   BUN  18   Creatinine  1.11   Sodium  139   Potassium  3.9   Chloride  99   Calcium  9.8   Albumin  4.5   Total Bilirubin  0.42   Alkaline Phosphatase  137 (A)   AST (SGOT)  25   ALT (SGPT)  32   WBC  8.74   Hemoglobin  16.4   Hematocrit  46.9   Platelets  330   PSA 0.55    (A) Abnormal value               Procedures      Assessment and Plan    Diagnoses and all orders for this visit:    1. Hyperlipidemia, unspecified hyperlipidemia type (Primary)  Assessment & Plan:  Well-controlled continue current medication    Orders:  -     Lipid Panel; Future    2. Hypertension, unspecified type  Assessment & Plan:  Doing great no side effects continue current medication.    Orders:  -     Comprehensive Metabolic Panel; Future  -     CBC & Differential; Future    3. Hypothyroidism, unspecified type  -     TSH; Future    4. Ear drainage, left  Comments:  Will culture drainage  We will have him follow-up with ENT  Refill eardrops which Dr. Lamb has given him  Orders:  -     Wound Culture - Drainage, Ear, Left; Future    5. Seasonal allergic rhinitis, unspecified trigger  Assessment & Plan:  Just restarted flonase      6. Recurrent major depressive disorder, in full remission (CMS/[facility])  Assessment & Plan:  Greatly improved on current medication  No side effects  Discussed risk factors we will continue to monitor        Follow Up   Return in about 3 months (around 9/9/2021).  Patient was given instructions and counseling regarding his condition or for health maintenance advice. Please see specific information pulled into the AVS if appropriate.

## 2021-06-09 NOTE — ASSESSMENT & PLAN NOTE
Greatly improved on current medication  No side effects  Discussed risk factors we will continue to monitor

## 2021-06-10 DIAGNOSIS — H92.12 EAR DRAINAGE, LEFT: ICD-10-CM

## 2021-06-10 PROCEDURE — 87070 CULTURE OTHR SPECIMN AEROBIC: CPT | Performed by: INTERNAL MEDICINE

## 2021-06-10 PROCEDURE — 87205 SMEAR GRAM STAIN: CPT | Performed by: INTERNAL MEDICINE

## 2021-06-12 RX ORDER — OFLOXACIN 3 MG/ML
5 SOLUTION AURICULAR (OTIC) DAILY
Qty: 10 ML | Refills: 0 | Status: SHIPPED | OUTPATIENT
Start: 2021-06-12 | End: 2021-09-17

## 2021-06-13 LAB
BACTERIA SPEC AEROBE CULT: NORMAL
GRAM STN SPEC: NORMAL
GRAM STN SPEC: NORMAL

## 2021-06-17 RX ORDER — DESVENLAFAXINE SUCCINATE 50 MG/1
TABLET, EXTENDED RELEASE ORAL
Qty: 90 TABLET | Refills: 0 | Status: SHIPPED | OUTPATIENT
Start: 2021-06-17 | End: 2021-06-22 | Stop reason: SDUPTHER

## 2021-06-17 RX ORDER — CHLORTHALIDONE 25 MG/1
TABLET ORAL
Qty: 90 TABLET | Refills: 0 | Status: SHIPPED | OUTPATIENT
Start: 2021-06-17 | End: 2021-06-22 | Stop reason: SDUPTHER

## 2021-06-21 RX ORDER — LOSARTAN POTASSIUM 50 MG/1
TABLET ORAL
Qty: 60 TABLET | Refills: 0 | Status: SHIPPED | OUTPATIENT
Start: 2021-06-21 | End: 2021-06-22 | Stop reason: SDUPTHER

## 2021-06-22 NOTE — TELEPHONE ENCOUNTER
Caller: Yousuf Em JAYLA    Relationship: Self    Best call back number: [phone]     Medication needed:   Requested Prescriptions     Pending Prescriptions Disp Refills   • chlorthalidone (HYGROTON) 25 MG tablet 90 tablet 0     Sig: Take 1 tablet by mouth Daily.   • atorvastatin (LIPITOR) 20 MG tablet     • levothyroxine (SYNTHROID, LEVOTHROID) 75 MCG tablet     • losartan (COZAAR) 50 MG tablet 60 tablet 0     Sig: Take 2 tablets by mouth Daily.   • desvenlafaxine (PRISTIQ) 50 MG 24 hr tablet 90 tablet 0     Sig: Take 1 tablet by mouth Daily.       When do you need the refill by: ASAP     What additional details did the patient provide when requesting the medication: PATIENT STATED THAT HE IS GOING ON VACATION TOMORROW 06/22/21 AND JUST REALIZED THAT HE IS OUT OF HIS MEDICATION. PATIENT ALSO REQUESTED THAT HE GET MORE THAN ONE REFILL ON THE BOTTLES AS HE GETS 0 REFILLS.     Does the patient have less than a 3 day supply:  [x] Yes  [] No    What is the patient's preferred pharmacy: [facility] [address] [place], [place] [address], [place] - [address] [place] [address] AT [facility] [address]  [phone] [facility] [phone] FX

## 2021-06-22 NOTE — TELEPHONE ENCOUNTER
Patient was last seen 5/5/21. Last GFR and Creatinine wnl. It has been a year since last lipid panel was drawn(5/19/2020) Trigs 203, Cholesterol-168 HDL-35, LDL-92.

## 2021-06-23 RX ORDER — ATORVASTATIN CALCIUM 20 MG/1
20 TABLET, FILM COATED ORAL DAILY
Qty: 30 TABLET | Refills: 2 | Status: SHIPPED | OUTPATIENT
Start: 2021-06-23 | End: 2021-09-21

## 2021-06-23 RX ORDER — CHLORTHALIDONE 25 MG/1
25 TABLET ORAL DAILY
Qty: 90 TABLET | Refills: 0 | Status: SHIPPED | OUTPATIENT
Start: 2021-06-23 | End: 2021-12-14

## 2021-06-23 RX ORDER — LEVOTHYROXINE SODIUM 0.07 MG/1
75 TABLET ORAL DAILY
Qty: 30 TABLET | Refills: 2 | Status: SHIPPED | OUTPATIENT
Start: 2021-06-23 | End: 2021-11-03 | Stop reason: SDUPTHER

## 2021-06-23 RX ORDER — LOSARTAN POTASSIUM 50 MG/1
100 TABLET ORAL DAILY
Qty: 60 TABLET | Refills: 0 | Status: SHIPPED | OUTPATIENT
Start: 2021-06-23 | End: 2021-08-23

## 2021-06-23 RX ORDER — DESVENLAFAXINE SUCCINATE 50 MG/1
50 TABLET, EXTENDED RELEASE ORAL DAILY
Qty: 90 TABLET | Refills: 0 | Status: SHIPPED | OUTPATIENT
Start: 2021-06-23 | End: 2021-06-28 | Stop reason: SDUPTHER

## 2021-06-28 RX ORDER — DESVENLAFAXINE 100 MG/1
TABLET, EXTENDED RELEASE ORAL
Qty: 30 TABLET | Refills: 0 | Status: SHIPPED | OUTPATIENT
Start: 2021-06-28 | End: 2021-07-27

## 2021-07-15 VITALS
BODY MASS INDEX: 40.32 KG/M2 | WEIGHT: 266 LBS | HEIGHT: 68 IN | TEMPERATURE: 97.7 F | HEART RATE: 84 BPM | DIASTOLIC BLOOD PRESSURE: 92 MMHG | SYSTOLIC BLOOD PRESSURE: 137 MMHG | OXYGEN SATURATION: 96 %

## 2021-07-26 NOTE — TELEPHONE ENCOUNTER
Caller: Yousuf Em    Relationship: Self    Best call back number: [phone]    Medication needed:   Requested Prescriptions     Pending Prescriptions Disp Refills   • carvedilol (COREG) 6.25 MG tablet         When do you need the refill by: AS SOON AS POSSIBLE    What additional details did the patient provide when requesting the medication: PATIENT WOULD LIKE IF THE PRESCRIPTION WOULD BE WRITTEN FOR 90 DAY SUPPLY SO HE DOESN'T HAVE TO KEEP REQUESTING REFILLS.     Does the patient have less than a 3 day supply:  [x] Yes  [] No    What is the patient's preferred pharmacy:    [facility] [address] [place] [address] AT [facility] [address]  [phone] [facility] [phone] FX

## 2021-07-27 RX ORDER — CARVEDILOL 6.25 MG/1
6.25 TABLET ORAL 2 TIMES DAILY WITH MEALS
Qty: 180 TABLET | Refills: 0 | Status: SHIPPED | OUTPATIENT
Start: 2021-07-27 | End: 2021-11-03 | Stop reason: SDUPTHER

## 2021-07-27 RX ORDER — DESVENLAFAXINE 100 MG/1
TABLET, EXTENDED RELEASE ORAL
Qty: 30 TABLET | Refills: 0 | Status: SHIPPED | OUTPATIENT
Start: 2021-07-27 | End: 2021-08-23

## 2021-08-23 RX ORDER — LOSARTAN POTASSIUM 50 MG/1
TABLET ORAL
Qty: 60 TABLET | Refills: 0 | Status: SHIPPED | OUTPATIENT
Start: 2021-08-23 | End: 2021-09-27

## 2021-08-23 RX ORDER — DESVENLAFAXINE 100 MG/1
TABLET, EXTENDED RELEASE ORAL
Qty: 30 TABLET | Refills: 0 | Status: SHIPPED | OUTPATIENT
Start: 2021-08-23 | End: 2021-09-27

## 2021-09-10 ENCOUNTER — TELEMEDICINE (OUTPATIENT)
Dept: INTERNAL MEDICINE | Facility: CLINIC | Age: 51
End: 2021-09-10

## 2021-09-10 DIAGNOSIS — I10 ESSENTIAL HYPERTENSION: Primary | ICD-10-CM

## 2021-09-10 DIAGNOSIS — F41.9 ANXIETY: ICD-10-CM

## 2021-09-10 DIAGNOSIS — E78.2 MIXED HYPERLIPIDEMIA: ICD-10-CM

## 2021-09-10 PROCEDURE — 99213 OFFICE O/P EST LOW 20 MIN: CPT | Performed by: INTERNAL MEDICINE

## 2021-09-10 NOTE — PROGRESS NOTES
Chief Complaint  Follow up for anxiety and blood pressure    Subjective          Yousuf Em presents to [facility]  History of Present Illness  Patient understanding that this is a telehealth visit.  I cannot perform a full physical exam, therefore something might be missed, or patient may need to come into the office for further evaluation.  Patient is understanding and consents to this type of visit.  Doximity      HTN-  Running really well around 120/83  No chest pain  No vision issues    Has noticed just a little bit of leg swelling  Mostly with prolonged standing or car    States that Stress is elevated  However the medicine helps with that  Side effect profile is much better    Objective   Vital Signs:   There were no vitals taken for this visit.    Physical Exam   Result Review :     Gen: well-nourished, no acute distress  HENT: atraumatic, normocephalic  Eyes: extraocular movements intact, no scleral icterus  Lung: breathing comfortably, no cough  Skin: no visible rash, no lesions  Neuro: grossly oriented to person, place, and time. no facial droop   Psych: normal mood and affect    Common labs    Common Labsle 2/8/21 2/8/21    1524 1524   Glucose  83   BUN  18   Creatinine  1.11   Sodium  139   Potassium  3.9   Chloride  99   Calcium  9.8   Albumin  4.5   Total Bilirubin  0.42   Alkaline Phosphatase  137 (A)   AST (SGOT)  25   ALT (SGPT)  32   WBC  8.74   Hemoglobin  16.4   Hematocrit  46.9   Platelets  330   PSA 0.55    (A) Abnormal value               Procedures      Assessment and Plan    Diagnoses and all orders for this visit:    1. Essential hypertension (Primary)  Comments:  doing well, cont current meds, he will cont to monitor    2. Mixed hyperlipidemia  Comments:  doing well, cont current meds    3. Anxiety  Comments:  doing well on current meds              Follow Up   Return in about 6 months (around 3/10/2022).  Patient was given instructions  and counseling regarding his condition or for health maintenance advice. Please see specific information pulled into the AVS if appropriate.

## 2021-09-17 ENCOUNTER — OFFICE VISIT (OUTPATIENT)
Dept: OTOLARYNGOLOGY | Facility: CLINIC | Age: 51
End: 2021-09-17

## 2021-09-17 VITALS — TEMPERATURE: 97.2 F | BODY MASS INDEX: 40.16 KG/M2 | HEIGHT: 68 IN | WEIGHT: 265 LBS

## 2021-09-17 DIAGNOSIS — H61.23 BILATERAL IMPACTED CERUMEN: ICD-10-CM

## 2021-09-17 DIAGNOSIS — H66.003 NON-RECURRENT ACUTE SUPPURATIVE OTITIS MEDIA OF BOTH EARS WITHOUT SPONTANEOUS RUPTURE OF TYMPANIC MEMBRANES: ICD-10-CM

## 2021-09-17 DIAGNOSIS — J30.2 SEASONAL ALLERGIC RHINITIS, UNSPECIFIED TRIGGER: Primary | ICD-10-CM

## 2021-09-17 DIAGNOSIS — H69.83 DYSFUNCTION OF BOTH EUSTACHIAN TUBES: ICD-10-CM

## 2021-09-17 PROCEDURE — 69210 REMOVE IMPACTED EAR WAX UNI: CPT | Performed by: OTOLARYNGOLOGY

## 2021-09-17 PROCEDURE — 99214 OFFICE O/P EST MOD 30 MIN: CPT | Performed by: OTOLARYNGOLOGY

## 2021-09-17 RX ORDER — CIPROFLOXACIN AND DEXAMETHASONE 3; 1 MG/ML; MG/ML
3 SUSPENSION/ DROPS AURICULAR (OTIC) 2 TIMES DAILY
Qty: 7.5 ML | Refills: 0 | Status: SHIPPED | OUTPATIENT
Start: 2021-09-17 | End: 2021-09-22

## 2021-09-17 NOTE — PROGRESS NOTES
Patient Name: Yousuf Em   Visit Date: 09/17/2021   Patient ID: [id number]  Provider: Teo Lamb MD    Sex: male  Location: [facility]   YOB: 1970  Location Address: [address] [place] [address], [address],?[place]?[address]    Primary Care Provider Angela Smith MD  Location Phone: [phone]    Referring Provider: No ref. provider found        Chief Complaint  Follow-up (Ear check, drainage )    Subjective    History of Present Illness  Yousuf Em is a 51 y.o. male who presents to [facility] today as a consult from No ref. provider found.    He presents the clinic today for follow-up regarding his ears and hearing.  He notes that he has had some drainage out of both ears for the last week.  He has used an antibiotic drop but has run out.  He notes that his hearing is still fine, in fact he informed that his hearing is much better with the ear tubes.  In general he has had no issues since the last time I saw him in the clinic in April.  He denies any ear pain.  He does keep his ears dry.    Past Medical History:   Diagnosis Date   • AR (allergic rhinitis)    • Arthritis    • Colitis    • Depression    • ETD (eustachian tube dysfunction)    • Forgetfulness    • Hearing loss    • High cholesterol    • Hyperlipemia    • Hypertension    • Labral tear of long head of biceps tendon, initial encounter 09/30/2015   • Labral tear of shoulder, left, sequela 09/25/2015   • Right lateral epicondylitis 10/21/2016   • Seasonal allergies    • Thyroid disorder        Past Surgical History:   Procedure Laterality Date   • CHOLECYSTECTOMY  2000   • COLONOSCOPY     • GALLBLADDER SURGERY     • OTHER SURGICAL HISTORY      JOINT SURGERY         Current Outpatient Medications:   •  atorvastatin (LIPITOR) 20 MG tablet, Take 1 tablet by mouth Daily for 90 days., Disp: 30 tablet, Rfl: 2  •  betamethasone, augmented, (DIPROLENE) 0.05 % ointment, , Disp: , Rfl:  "  •  carvedilol (COREG) 6.25 MG tablet, Take 1 tablet by mouth 2 (Two) Times a Day With Meals., Disp: 180 tablet, Rfl: 0  •  chlorthalidone (HYGROTON) 25 MG tablet, Take 1 tablet by mouth Daily., Disp: 90 tablet, Rfl: 0  •  desvenlafaxine (PRISTIQ) 100 MG 24 hr tablet, TAKE ONE TABLET BY MOUTH DAILY, Disp: 30 tablet, Rfl: 0  •  fexofenadine (Allegra Allergy) 180 MG tablet, Allegra 180 mg oral tablet take 1 tablet (180 mg) by oral route once daily   Suspended, Disp: , Rfl:   •  levothyroxine (SYNTHROID, LEVOTHROID) 75 MCG tablet, Take 1 tablet by mouth Daily for 90 days., Disp: 30 tablet, Rfl: 2  •  losartan (COZAAR) 50 MG tablet, TAKE TWO TABLETS BY MOUTH DAILY, Disp: 60 tablet, Rfl: 0  •  ciprofloxacin-dexamethasone (CIPRODEX) 0.3-0.1 % otic suspension, Administer 3 drops into both ears 2 (Two) Times a Day for 5 days., Disp: 7.5 mL, Rfl: 0     No Known Allergies    Family History   Problem Relation Age of Onset   • Arthritis Mother    • Cancer Mother    • Heart disease Mother    • Diabetes Mother    • Heart disease Father    • Heart disease Sister    • Stroke Maternal Grandmother         Social History     Social History Narrative   • Not on file       Objective     Vital Signs:   Temp 97.2 °F (36.2 °C) (Temporal)   Ht 172.7 cm (68\")   Wt 120 kg (265 lb)   BMI 40.29 kg/m²       Physical Exam    Ear Cerumen Removal    Date/Time: 9/17/2021 9:34 AM  Performed by: Teo Lamb MD  Authorized by: Teo Lamb MD     Anesthesia:  Local Anesthetic: none  Location details: right ear and left ear  Procedure type: instrumentation   Sedation:  Patient sedated: no            Constitutional   Appearance  · : well developed, well-nourished, alert and in no acute distress, voice clear and strong    Head  Inspection  · : no deformities or lesions  Face  Inspection  · : No facial lesions; House-Brackmann I/VI bilaterally  Palpation  · : No TMJ crepitus nor  muscle tenderness " bilaterally    Eyes  Vision  Visual Fields  · : Extraocular movements are intact. No spontaneous or gaze-induced nystagmus.  Conjunctivae  · : clear  Sclerae  · : clear  Pupils and Irises  · : pupils equal, round, and reactive to light.     Ears, Nose, Mouth and Throat    Ears    External Ears  · : appearance within normal limits, no lesions present  Otoscopic Examination  · : Cerumen impactions bilaterally, cleared with suction microscope, tympanic membrane appearance moist with mucoid drainage in the left side and thin watery drainage on the right, PE tubes patent and functioning, well-aerated middle ears  Hearing  · : intact to conversational voice both ears  Tunning fork testing:     :    Nose    External Nose  · : appearance normal  Intranasal Exam  · : mucosa within normal limits, vestibules normal, no intranasal lesions present, septum midline, sinuses non tender to percussion  Oral Cavity    Oral Mucosa  · : oral mucosa normal without pallor or cyanosis  Lips  · : lip appearance normal  Teeth  · : normal dentition for age  Gums  · : gums pink, non-swollen, no bleeding present  Tongue  · : tongue appearance normal; normal mobility  Palate  · : hard palate normal, soft palate appearance normal with symmetric mobility    Throat    Oropharynx  · : no inflammation or lesions present, tonsils within normal limits  Hypopharynx  · : appearance within normal limits, superior epiglottis within normal limits  Larynx  · : appearance within normal limits, vocal cords within normal limits, no lesions present    Neck  Inspection/Palpation  · : normal appearance, no masses or tenderness, trachea midline; thyroid size normal, nontender, no nodules or masses present on palpation    Respiratory  Respiratory Effort  · : breathing unlabored  Inspection of Chest  · : normal appearance, no retractions    Cardiovascular  Heart  · : regular rate and rhythm    Lymphatic  Neck  · : no lymphadenopathy present  Supraclavicular  Nodes  · : no lymphadenopathy present  Preauricular Nodes  · : no lymphadenopathy present    Skin and Subcutaneous Tissue  General Inspection  · : Regarding face and neck - there are no rashes present, no lesions present, and no areas of discoloration    Neurologic  Cranial Nerves  · : cranial nerves II-XII are grossly intact bilaterally  Gait and Station  · : normal gait, able to stand without diffculty    Psychiatric  Judgement and Insight  · : judgment and insight intact  Mood and Affect  · : mood normal, affect appropriate          Assessment and Plan    Diagnoses and all orders for this visit:    1. Seasonal allergic rhinitis, unspecified trigger (Primary)    2. Dysfunction of both eustachian tubes    3. Non-recurrent acute suppurative otitis media of both ears without spontaneous rupture of tympanic membranes  -     ciprofloxacin-dexamethasone (CIPRODEX) 0.3-0.1 % otic suspension; Administer 3 drops into both ears 2 (Two) Times a Day for 5 days.  Dispense: 7.5 mL; Refill: 0    4. Bilateral impacted cerumen    Examination revealed cerumen impactions which were cleared as well as some clear drainage out of the right ear mucoid drainage on the left ear.  This was cleared with suction of the microscope.  Ciprodex drops were infused, and I prescribed drops for him to continue at home.  He understands to keep his ears dry.  I will see him back on an as-needed basis should there be any further issues.    Follow Up   No follow-ups on file.  Patient was given instructions and counseling regarding his condition or for health maintenance advice. Please see specific information pulled into the AVS if appropriate.

## 2021-09-26 PROBLEM — F41.9 ANXIETY: Status: ACTIVE | Noted: 2021-09-26

## 2021-09-27 RX ORDER — LOSARTAN POTASSIUM 50 MG/1
TABLET ORAL
Qty: 180 TABLET | Refills: 1 | Status: SHIPPED | OUTPATIENT
Start: 2021-09-27 | End: 2022-03-04 | Stop reason: SDUPTHER

## 2021-09-27 RX ORDER — DESVENLAFAXINE 100 MG/1
TABLET, EXTENDED RELEASE ORAL
Qty: 90 TABLET | Refills: 1 | Status: SHIPPED | OUTPATIENT
Start: 2021-09-27 | End: 2022-03-04 | Stop reason: SDUPTHER

## 2021-11-03 RX ORDER — LEVOTHYROXINE SODIUM 0.07 MG/1
75 TABLET ORAL DAILY
Qty: 90 TABLET | Refills: 0 | Status: SHIPPED | OUTPATIENT
Start: 2021-11-03 | End: 2022-02-07 | Stop reason: SDUPTHER

## 2021-11-03 RX ORDER — CARVEDILOL 6.25 MG/1
6.25 TABLET ORAL 2 TIMES DAILY WITH MEALS
Qty: 180 TABLET | Refills: 0 | Status: SHIPPED | OUTPATIENT
Start: 2021-11-03 | End: 2022-02-07 | Stop reason: SDUPTHER

## 2021-11-03 NOTE — TELEPHONE ENCOUNTER
Caller: Yousuf Em    Relationship: Self      Medication requested (name and dosage):     Requested Prescriptions:   Requested Prescriptions     Pending Prescriptions Disp Refills   • levothyroxine (SYNTHROID, LEVOTHROID) 75 MCG tablet 30 tablet 2     Sig: Take 1 tablet by mouth Daily for 90 days.        Pharmacy where request should be sent:[facility] [place] [address] AT [facility] [address] - [phone] [facility] [phone]   [phone]      Best call back number: [phone]     Does the patient have less than a 3 day supply:  [x] Yes  [] No    Diane Duncan Rep   11/03/21 10:02 EDT

## 2021-11-04 ENCOUNTER — CLINICAL SUPPORT (OUTPATIENT)
Dept: INTERNAL MEDICINE | Facility: CLINIC | Age: 51
End: 2021-11-04

## 2021-11-04 DIAGNOSIS — I10 HYPERTENSION, UNSPECIFIED TYPE: ICD-10-CM

## 2021-11-04 DIAGNOSIS — E78.5 HYPERLIPIDEMIA, UNSPECIFIED HYPERLIPIDEMIA TYPE: ICD-10-CM

## 2021-11-04 DIAGNOSIS — E03.9 HYPOTHYROIDISM, UNSPECIFIED TYPE: ICD-10-CM

## 2021-11-04 LAB
ALBUMIN SERPL-MCNC: 4.3 G/DL (ref 3.5–5.2)
ALBUMIN/GLOB SERPL: 1.5 G/DL
ALP SERPL-CCNC: 95 U/L (ref 39–117)
ALT SERPL W P-5'-P-CCNC: 37 U/L (ref 1–41)
ANION GAP SERPL CALCULATED.3IONS-SCNC: 9.8 MMOL/L (ref 5–15)
AST SERPL-CCNC: 22 U/L (ref 1–40)
BASOPHILS # BLD AUTO: 0.06 10*3/MM3 (ref 0–0.2)
BASOPHILS NFR BLD AUTO: 0.8 % (ref 0–1.5)
BILIRUB SERPL-MCNC: 0.4 MG/DL (ref 0–1.2)
BUN SERPL-MCNC: 13 MG/DL (ref 6–20)
BUN/CREAT SERPL: 11.9 (ref 7–25)
CALCIUM SPEC-SCNC: 9.1 MG/DL (ref 8.6–10.5)
CHLORIDE SERPL-SCNC: 103 MMOL/L (ref 98–107)
CHOLEST SERPL-MCNC: 203 MG/DL (ref 0–200)
CO2 SERPL-SCNC: 26.2 MMOL/L (ref 22–29)
CREAT SERPL-MCNC: 1.09 MG/DL (ref 0.76–1.27)
DEPRECATED RDW RBC AUTO: 42.6 FL (ref 37–54)
EOSINOPHIL # BLD AUTO: 0.24 10*3/MM3 (ref 0–0.4)
EOSINOPHIL NFR BLD AUTO: 3.4 % (ref 0.3–6.2)
ERYTHROCYTE [DISTWIDTH] IN BLOOD BY AUTOMATED COUNT: 13.1 % (ref 12.3–15.4)
GFR SERPL CREATININE-BSD FRML MDRD: 71 ML/MIN/1.73
GLOBULIN UR ELPH-MCNC: 2.8 GM/DL
GLUCOSE SERPL-MCNC: 105 MG/DL (ref 65–99)
HCT VFR BLD AUTO: 39.8 % (ref 37.5–51)
HDLC SERPL-MCNC: 32 MG/DL (ref 40–60)
HGB BLD-MCNC: 14.3 G/DL (ref 13–17.7)
IMM GRANULOCYTES # BLD AUTO: 0.02 10*3/MM3 (ref 0–0.05)
IMM GRANULOCYTES NFR BLD AUTO: 0.3 % (ref 0–0.5)
LDLC SERPL CALC-MCNC: 131 MG/DL (ref 0–100)
LDLC/HDLC SERPL: 3.97 {RATIO}
LYMPHOCYTES # BLD AUTO: 2.11 10*3/MM3 (ref 0.7–3.1)
LYMPHOCYTES NFR BLD AUTO: 29.7 % (ref 19.6–45.3)
MCH RBC QN AUTO: 32.1 PG (ref 26.6–33)
MCHC RBC AUTO-ENTMCNC: 35.9 G/DL (ref 31.5–35.7)
MCV RBC AUTO: 89.2 FL (ref 79–97)
MONOCYTES # BLD AUTO: 0.52 10*3/MM3 (ref 0.1–0.9)
MONOCYTES NFR BLD AUTO: 7.3 % (ref 5–12)
NEUTROPHILS NFR BLD AUTO: 4.15 10*3/MM3 (ref 1.7–7)
NEUTROPHILS NFR BLD AUTO: 58.5 % (ref 42.7–76)
NRBC BLD AUTO-RTO: 0 /100 WBC (ref 0–0.2)
PLATELET # BLD AUTO: 330 10*3/MM3 (ref 140–450)
PMV BLD AUTO: 9.9 FL (ref 6–12)
POTASSIUM SERPL-SCNC: 3.5 MMOL/L (ref 3.5–5.2)
PROT SERPL-MCNC: 7.1 G/DL (ref 6–8.5)
RBC # BLD AUTO: 4.46 10*6/MM3 (ref 4.14–5.8)
SODIUM SERPL-SCNC: 139 MMOL/L (ref 136–145)
TRIGL SERPL-MCNC: 220 MG/DL (ref 0–150)
TSH SERPL DL<=0.05 MIU/L-ACNC: 3.98 UIU/ML (ref 0.27–4.2)
VLDLC SERPL-MCNC: 40 MG/DL (ref 5–40)
WBC # BLD AUTO: 7.1 10*3/MM3 (ref 3.4–10.8)

## 2021-11-04 PROCEDURE — 84443 ASSAY THYROID STIM HORMONE: CPT | Performed by: INTERNAL MEDICINE

## 2021-11-04 PROCEDURE — 85025 COMPLETE CBC W/AUTO DIFF WBC: CPT | Performed by: INTERNAL MEDICINE

## 2021-11-04 PROCEDURE — 36415 COLL VENOUS BLD VENIPUNCTURE: CPT | Performed by: INTERNAL MEDICINE

## 2021-11-04 PROCEDURE — 80061 LIPID PANEL: CPT | Performed by: INTERNAL MEDICINE

## 2021-11-04 PROCEDURE — 80053 COMPREHEN METABOLIC PANEL: CPT | Performed by: INTERNAL MEDICINE

## 2021-11-08 ENCOUNTER — TELEPHONE (OUTPATIENT)
Dept: INTERNAL MEDICINE | Facility: CLINIC | Age: 51
End: 2021-11-08

## 2021-11-08 NOTE — TELEPHONE ENCOUNTER
----- Message from Angela Smith MD sent at 11/5/2021  7:05 AM EDT -----  Blood work looks good other than sightly elevated sugar and cholesterol.  Just cont to work on eating well and staying healthy.

## 2021-12-14 RX ORDER — CHLORTHALIDONE 25 MG/1
TABLET ORAL
Qty: 90 TABLET | Refills: 0 | Status: SHIPPED | OUTPATIENT
Start: 2021-12-14 | End: 2022-03-04 | Stop reason: SDUPTHER

## 2021-12-14 RX ORDER — DESVENLAFAXINE SUCCINATE 50 MG/1
TABLET, EXTENDED RELEASE ORAL
Qty: 90 TABLET | Refills: 0 | OUTPATIENT
Start: 2021-12-14

## 2022-02-08 RX ORDER — CARVEDILOL 6.25 MG/1
6.25 TABLET ORAL 2 TIMES DAILY WITH MEALS
Qty: 180 TABLET | Refills: 0 | Status: SHIPPED | OUTPATIENT
Start: 2022-02-08 | End: 2022-03-04 | Stop reason: SDUPTHER

## 2022-02-08 RX ORDER — LEVOTHYROXINE SODIUM 0.07 MG/1
75 TABLET ORAL DAILY
Qty: 90 TABLET | Refills: 0 | Status: SHIPPED | OUTPATIENT
Start: 2022-02-08 | End: 2022-03-04 | Stop reason: SDUPTHER

## 2022-03-04 ENCOUNTER — OFFICE VISIT (OUTPATIENT)
Dept: INTERNAL MEDICINE | Facility: CLINIC | Age: 52
End: 2022-03-04

## 2022-03-04 VITALS
WEIGHT: 276.8 LBS | DIASTOLIC BLOOD PRESSURE: 74 MMHG | TEMPERATURE: 97.8 F | SYSTOLIC BLOOD PRESSURE: 122 MMHG | RESPIRATION RATE: 18 BRPM | OXYGEN SATURATION: 97 % | BODY MASS INDEX: 41.95 KG/M2 | HEART RATE: 62 BPM | HEIGHT: 68 IN

## 2022-03-04 DIAGNOSIS — F33.42 RECURRENT MAJOR DEPRESSIVE DISORDER, IN FULL REMISSION: ICD-10-CM

## 2022-03-04 DIAGNOSIS — H69.83 DYSFUNCTION OF BOTH EUSTACHIAN TUBES: ICD-10-CM

## 2022-03-04 DIAGNOSIS — E06.3 HYPOTHYROIDISM DUE TO HASHIMOTO'S THYROIDITIS: ICD-10-CM

## 2022-03-04 DIAGNOSIS — E78.2 MIXED HYPERLIPIDEMIA: ICD-10-CM

## 2022-03-04 DIAGNOSIS — E03.8 HYPOTHYROIDISM DUE TO HASHIMOTO'S THYROIDITIS: ICD-10-CM

## 2022-03-04 DIAGNOSIS — I10 PRIMARY HYPERTENSION: ICD-10-CM

## 2022-03-04 DIAGNOSIS — E66.01 CLASS 3 SEVERE OBESITY WITH SERIOUS COMORBIDITY AND BODY MASS INDEX (BMI) OF 40.0 TO 44.9 IN ADULT, UNSPECIFIED OBESITY TYPE: Primary | ICD-10-CM

## 2022-03-04 PROCEDURE — 99214 OFFICE O/P EST MOD 30 MIN: CPT | Performed by: INTERNAL MEDICINE

## 2022-03-04 RX ORDER — CARVEDILOL 6.25 MG/1
6.25 TABLET ORAL 2 TIMES DAILY WITH MEALS
Qty: 180 TABLET | Refills: 0 | Status: SHIPPED | OUTPATIENT
Start: 2022-03-04 | End: 2022-05-10 | Stop reason: SDUPTHER

## 2022-03-04 RX ORDER — SEMAGLUTIDE 0.25 MG/.5ML
0.25 INJECTION, SOLUTION SUBCUTANEOUS WEEKLY
Qty: 0.5 ML | Refills: 2 | Status: SHIPPED | OUTPATIENT
Start: 2022-03-04 | End: 2022-03-04 | Stop reason: SDUPTHER

## 2022-03-04 RX ORDER — LOSARTAN POTASSIUM 50 MG/1
100 TABLET ORAL DAILY
Qty: 180 TABLET | Refills: 1 | Status: SHIPPED | OUTPATIENT
Start: 2022-03-04 | End: 2022-04-07

## 2022-03-04 RX ORDER — ATORVASTATIN CALCIUM 20 MG/1
20 TABLET, FILM COATED ORAL DAILY
Qty: 90 TABLET | Refills: 1 | Status: ON HOLD | OUTPATIENT
Start: 2022-03-04 | End: 2022-05-06 | Stop reason: SDUPTHER

## 2022-03-04 RX ORDER — SEMAGLUTIDE 0.25 MG/.5ML
0.25 INJECTION, SOLUTION SUBCUTANEOUS WEEKLY
Qty: 0.5 ML | Refills: 2 | Status: SHIPPED | OUTPATIENT
Start: 2022-03-04 | End: 2022-03-21

## 2022-03-04 RX ORDER — ATORVASTATIN CALCIUM 20 MG/1
20 TABLET, FILM COATED ORAL DAILY
Qty: 90 TABLET | Refills: 1 | Status: SHIPPED | OUTPATIENT
Start: 2022-03-04 | End: 2022-03-04 | Stop reason: SDUPTHER

## 2022-03-04 RX ORDER — CHLORTHALIDONE 25 MG/1
25 TABLET ORAL DAILY
Qty: 90 TABLET | Refills: 0 | Status: SHIPPED | OUTPATIENT
Start: 2022-03-04 | End: 2022-03-14

## 2022-03-04 RX ORDER — CIPROFLOXACIN AND DEXAMETHASONE 3; 1 MG/ML; MG/ML
4 SUSPENSION/ DROPS AURICULAR (OTIC) 2 TIMES DAILY
Qty: 7.5 ML | Refills: 1 | Status: SHIPPED | OUTPATIENT
Start: 2022-03-04 | End: 2022-05-06 | Stop reason: HOSPADM

## 2022-03-04 RX ORDER — LEVOTHYROXINE SODIUM 0.07 MG/1
75 TABLET ORAL DAILY
Qty: 90 TABLET | Refills: 0 | Status: SHIPPED | OUTPATIENT
Start: 2022-03-04 | End: 2022-05-10 | Stop reason: SDUPTHER

## 2022-03-04 RX ORDER — DESVENLAFAXINE 100 MG/1
100 TABLET, EXTENDED RELEASE ORAL DAILY
Qty: 90 TABLET | Refills: 1 | Status: SHIPPED | OUTPATIENT
Start: 2022-03-04 | End: 2022-09-05 | Stop reason: SDUPTHER

## 2022-03-04 RX ORDER — CIPROFLOXACIN AND DEXAMETHASONE 3; 1 MG/ML; MG/ML
4 SUSPENSION/ DROPS AURICULAR (OTIC) 2 TIMES DAILY
Qty: 7.5 ML | Refills: 1 | Status: SHIPPED | OUTPATIENT
Start: 2022-03-04 | End: 2022-03-04 | Stop reason: SDUPTHER

## 2022-03-04 NOTE — PROGRESS NOTES
"Chief Complaint  Weight Management and Med Management    Subjective          Yousuf Em presents to [facility]  History of Present Illness     States that stress is ok  New job is more stressful, but he     ED, but he feels that it is weight related    No chest pain  Does notice a rapid heart beat from time to time  Mostly with activity and lying on his side    Reviewed recent note from Dr. Lamb  He is still having a lot of ear drainage  Would like a refill on his drops    Wants to discuss gastric bypass surgery    Hasn't been seeing [facility]   bp cuff in storage    Has been working on putting a fence up    He is interested in weight loss surgery  He has been going to the gym several times a week and his weight never changed  He was never able to get the weight less than 235    Objective   Vital Signs:   /74 (BP Location: Right arm, Patient Position: Sitting, Cuff Size: Adult)   Pulse 62   Temp 97.8 °F (36.6 °C) (Temporal)   Resp 18   Ht 172.7 cm (68\")   Wt 126 kg (276 lb 12.8 oz)   SpO2 97%   BMI 42.09 kg/m²     Physical Exam  Vitals reviewed.   Constitutional:       Appearance: Normal appearance. He is well-developed.   HENT:      Head: Normocephalic and atraumatic.      Right Ear: External ear normal.      Left Ear: External ear normal.   Eyes:      Conjunctiva/sclera: Conjunctivae normal.      Pupils: Pupils are equal, round, and reactive to light.   Cardiovascular:      Rate and Rhythm: Normal rate and regular rhythm.      Heart sounds: No murmur heard.  No friction rub. No gallop.    Pulmonary:      Effort: Pulmonary effort is normal.      Breath sounds: Normal breath sounds. No wheezing or rhonchi.   Skin:     General: Skin is warm and dry.   Neurological:      Mental Status: He is alert and oriented to person, place, and time.      Cranial Nerves: No cranial nerve deficit.   Psychiatric:         Mood and Affect: Affect normal.         " Behavior: Behavior normal.         Thought Content: Thought content normal.        Result Review :{Labs  Result Review  Imaging  Med Tab  Media  Procedures :23}       Common labs    Common Labsle 11/4/21 11/4/21 11/4/21    1008 1008 1009   Glucose  105 (A)    BUN  13    Creatinine  1.09    eGFR Non African Am  71    Sodium  139    Potassium  3.5    Chloride  103    Calcium  9.1    Albumin  4.30    Total Bilirubin  0.4    Alkaline Phosphatase  95    AST (SGOT)  22    ALT (SGPT)  37    WBC   7.10   Hemoglobin   14.3   Hematocrit   39.8   Platelets   330   Total Cholesterol 203 (A)     Triglycerides 220 (A)     HDL Cholesterol 32 (A)     LDL Cholesterol  131 (A)     (A) Abnormal value                     Procedures        Assessment and Plan    Diagnoses and all orders for this visit:    1. Class 3 severe obesity with serious comorbidity and body mass index (BMI) of 40.0 to 44.9 in adult, unspecified obesity type (HCC) (Primary)  Comments:  Will try GLP-1 for weight loss will also refer to bariatric surgeon  Orders:  -     Ambulatory Referral to Bariatric Surgery    2. Mixed hyperlipidemia  Comments:  Doing well continue current meds    3. Primary hypertension  Comments:  Well-controlled continue current meds    4. Dysfunction of both eustachian tubes  Comments:  Represcribe Ciprodex continue to follow with ENT    5. Recurrent major depressive disorder, in full remission (HCC)  Comments:  Doing great on current medication continue for now    6. Hypothyroidism due to Hashimoto's thyroiditis  Comments:  Will check labs and adjust meds as needed based on results    Other orders  -     Discontinue: ciprofloxacin-dexamethasone (Ciprodex) 0.3-0.1 % otic suspension; Administer 4 drops into both ears 2 (Two) Times a Day.  Dispense: 7.5 mL; Refill: 1  -     Discontinue: atorvastatin (LIPITOR) 20 MG tablet; Take 1 tablet by mouth Daily.  Dispense: 90 tablet; Refill: 1  -     Discontinue: Semaglutide-Weight Management  (Wegovy) 0.25 MG/0.5ML solution auto-injector; Inject 0.25 mg under the skin into the appropriate area as directed 1 (One) Time Per Week.  Dispense: 0.5 mL; Refill: 2  -     atorvastatin (LIPITOR) 20 MG tablet; Take 1 tablet by mouth Daily.  Dispense: 90 tablet; Refill: 1  -     ciprofloxacin-dexamethasone (Ciprodex) 0.3-0.1 % otic suspension; Administer 4 drops into both ears 2 (Two) Times a Day.  Dispense: 7.5 mL; Refill: 1  -     Semaglutide-Weight Management (Wegovy) 0.25 MG/0.5ML solution auto-injector; Inject 0.25 mg under the skin into the appropriate area as directed 1 (One) Time Per Week.  Dispense: 0.5 mL; Refill: 2  -     desvenlafaxine (PRISTIQ) 100 MG 24 hr tablet; Take 1 tablet by mouth Daily.  Dispense: 90 tablet; Refill: 1  -     chlorthalidone (HYGROTON) 25 MG tablet; Take 1 tablet by mouth Daily.  Dispense: 90 tablet; Refill: 0  -     carvedilol (COREG) 6.25 MG tablet; Take 1 tablet by mouth 2 (Two) Times a Day With Meals.  Dispense: 180 tablet; Refill: 0  -     losartan (COZAAR) 50 MG tablet; Take 2 tablets by mouth Daily.  Dispense: 180 tablet; Refill: 1  -     levothyroxine (SYNTHROID, LEVOTHROID) 75 MCG tablet; Take 1 tablet by mouth Daily for 90 days.  Dispense: 90 tablet; Refill: 0                  Follow Up   Return in about 2 months (around 5/4/2022).  Patient was given instructions and counseling regarding his condition or for health maintenance advice. Please see specific information pulled into the AVS if appropriate.

## 2022-03-07 ENCOUNTER — PRIOR AUTHORIZATION (OUTPATIENT)
Dept: INTERNAL MEDICINE | Facility: CLINIC | Age: 52
End: 2022-03-07

## 2022-03-10 NOTE — TELEPHONE ENCOUNTER
Looks like saxenda is on the non-formulary list when searching and it requires a PA. Do we want to give this medication a try with a PA?

## 2022-03-10 NOTE — TELEPHONE ENCOUNTER
Due to his other risks he is not a good candidate for phentermine or Topamax is there another weight loss medicine his insurance will cover such as Saxenda?

## 2022-03-11 ENCOUNTER — TELEPHONE (OUTPATIENT)
Dept: INTERNAL MEDICINE | Facility: CLINIC | Age: 52
End: 2022-03-11

## 2022-03-11 NOTE — TELEPHONE ENCOUNTER
Cover my meds finally gave an option to appeal, appeal sent. If not approved, will try PA for saxenda instead

## 2022-03-11 NOTE — TELEPHONE ENCOUNTER
Message taken off voicemail.    Ins not wanting to cover.  Antiobesity meds not covered.    May try a PA through [employer] or call ins @ [phone]

## 2022-03-14 RX ORDER — CHLORTHALIDONE 25 MG/1
TABLET ORAL
Qty: 90 TABLET | Refills: 0 | Status: SHIPPED | OUTPATIENT
Start: 2022-03-14 | End: 2022-05-10 | Stop reason: SDUPTHER

## 2022-03-21 NOTE — TELEPHONE ENCOUNTER
Still no answer from plan about wegovy, will start pa on saxenda.   Cover my meds is needing the following information for the saxenda: dosing schedule  Quantity  Days supply

## 2022-03-22 ENCOUNTER — PRIOR AUTHORIZATION (OUTPATIENT)
Dept: INTERNAL MEDICINE | Facility: CLINIC | Age: 52
End: 2022-03-22

## 2022-03-25 NOTE — TELEPHONE ENCOUNTER
Red rule verified and correct.    [facility] pharmacy calling, asking if the quantity can be 5 pens. They cannot break up a box.    VO to change to 5 pens.

## 2022-04-06 ENCOUNTER — PRIOR AUTHORIZATION (OUTPATIENT)
Dept: INTERNAL MEDICINE | Facility: CLINIC | Age: 52
End: 2022-04-06

## 2022-04-07 RX ORDER — LOSARTAN POTASSIUM 50 MG/1
TABLET ORAL
Qty: 180 TABLET | Refills: 1 | Status: SHIPPED | OUTPATIENT
Start: 2022-04-07 | End: 2022-05-10 | Stop reason: SDUPTHER

## 2022-05-04 ENCOUNTER — HOSPITAL ENCOUNTER (OUTPATIENT)
Facility: HOSPITAL | Age: 52
Discharge: HOME | End: 2022-05-06
Attending: EMERGENCY MEDICINE | Admitting: INTERNAL MEDICINE

## 2022-05-04 ENCOUNTER — OFFICE VISIT (OUTPATIENT)
Dept: INTERNAL MEDICINE | Facility: CLINIC | Age: 52
End: 2022-05-04

## 2022-05-04 ENCOUNTER — APPOINTMENT (OUTPATIENT)
Dept: GENERAL RADIOLOGY | Facility: HOSPITAL | Age: 52
End: 2022-05-04

## 2022-05-04 VITALS
RESPIRATION RATE: 18 BRPM | WEIGHT: 272 LBS | OXYGEN SATURATION: 97 % | TEMPERATURE: 97.5 F | SYSTOLIC BLOOD PRESSURE: 138 MMHG | DIASTOLIC BLOOD PRESSURE: 80 MMHG | HEIGHT: 68 IN | HEART RATE: 88 BPM | BODY MASS INDEX: 41.22 KG/M2

## 2022-05-04 DIAGNOSIS — R07.9 CHEST PAIN, UNSPECIFIED TYPE: ICD-10-CM

## 2022-05-04 DIAGNOSIS — J18.9 PNEUMONIA DUE TO INFECTIOUS ORGANISM, UNSPECIFIED LATERALITY, UNSPECIFIED PART OF LUNG: ICD-10-CM

## 2022-05-04 DIAGNOSIS — I20.0 UNSTABLE ANGINA: ICD-10-CM

## 2022-05-04 DIAGNOSIS — I20.8 STABLE ANGINA: Primary | ICD-10-CM

## 2022-05-04 DIAGNOSIS — R07.9 CHEST PAIN, UNSPECIFIED TYPE: Primary | ICD-10-CM

## 2022-05-04 LAB
ALBUMIN SERPL-MCNC: 4.4 G/DL (ref 3.5–5.2)
ALBUMIN SERPL-MCNC: 4.5 G/DL (ref 3.5–5.2)
ALBUMIN/GLOB SERPL: 1.3 G/DL
ALBUMIN/GLOB SERPL: 1.4 G/DL
ALP SERPL-CCNC: 85 U/L (ref 39–117)
ALP SERPL-CCNC: 85 U/L (ref 39–117)
ALT SERPL W P-5'-P-CCNC: 41 U/L (ref 1–41)
ALT SERPL W P-5'-P-CCNC: 43 U/L (ref 1–41)
ANION GAP SERPL CALCULATED.3IONS-SCNC: 12.4 MMOL/L (ref 5–15)
ANION GAP SERPL CALCULATED.3IONS-SCNC: 13 MMOL/L (ref 5–15)
AST SERPL-CCNC: 25 U/L (ref 1–40)
AST SERPL-CCNC: 29 U/L (ref 1–40)
BASOPHILS # BLD AUTO: 0.06 10*3/MM3 (ref 0–0.2)
BASOPHILS # BLD AUTO: 0.06 10*3/MM3 (ref 0–0.2)
BASOPHILS NFR BLD AUTO: 0.7 % (ref 0–1.5)
BASOPHILS NFR BLD AUTO: 0.7 % (ref 0–1.5)
BILIRUB SERPL-MCNC: 0.4 MG/DL (ref 0–1.2)
BILIRUB SERPL-MCNC: 0.5 MG/DL (ref 0–1.2)
BUN SERPL-MCNC: 18 MG/DL (ref 6–20)
BUN SERPL-MCNC: 20 MG/DL (ref 6–20)
BUN/CREAT SERPL: 14 (ref 7–25)
BUN/CREAT SERPL: 17.4 (ref 7–25)
CALCIUM SPEC-SCNC: 9.4 MG/DL (ref 8.6–10.5)
CALCIUM SPEC-SCNC: 9.5 MG/DL (ref 8.6–10.5)
CHLORIDE SERPL-SCNC: 100 MMOL/L (ref 98–107)
CHLORIDE SERPL-SCNC: 102 MMOL/L (ref 98–107)
CHOLEST SERPL-MCNC: 176 MG/DL (ref 0–200)
CK MB SERPL-CCNC: 2.18 NG/ML
CK SERPL-CCNC: 233 U/L (ref 20–200)
CK SERPL-CCNC: 245 U/L (ref 20–200)
CO2 SERPL-SCNC: 23 MMOL/L (ref 22–29)
CO2 SERPL-SCNC: 24.6 MMOL/L (ref 22–29)
CREAT SERPL-MCNC: 1.15 MG/DL (ref 0.76–1.27)
CREAT SERPL-MCNC: 1.29 MG/DL (ref 0.76–1.27)
DEPRECATED RDW RBC AUTO: 41.4 FL (ref 37–54)
DEPRECATED RDW RBC AUTO: 43.7 FL (ref 37–54)
EGFRCR SERPLBLD CKD-EPI 2021: 67.1 ML/MIN/1.73
EGFRCR SERPLBLD CKD-EPI 2021: 77.1 ML/MIN/1.73
EOSINOPHIL # BLD AUTO: 0.23 10*3/MM3 (ref 0–0.4)
EOSINOPHIL # BLD AUTO: 0.26 10*3/MM3 (ref 0–0.4)
EOSINOPHIL NFR BLD AUTO: 2.5 % (ref 0.3–6.2)
EOSINOPHIL NFR BLD AUTO: 3 % (ref 0.3–6.2)
ERYTHROCYTE [DISTWIDTH] IN BLOOD BY AUTOMATED COUNT: 12.8 % (ref 12.3–15.4)
ERYTHROCYTE [DISTWIDTH] IN BLOOD BY AUTOMATED COUNT: 13.3 % (ref 12.3–15.4)
GLOBULIN UR ELPH-MCNC: 3.3 GM/DL
GLOBULIN UR ELPH-MCNC: 3.4 GM/DL
GLUCOSE SERPL-MCNC: 107 MG/DL (ref 65–99)
GLUCOSE SERPL-MCNC: 97 MG/DL (ref 65–99)
HCT VFR BLD AUTO: 41.4 % (ref 37.5–51)
HCT VFR BLD AUTO: 44.2 % (ref 37.5–51)
HDLC SERPL-MCNC: 31 MG/DL (ref 40–60)
HGB BLD-MCNC: 14.7 G/DL (ref 13–17.7)
HGB BLD-MCNC: 15.5 G/DL (ref 13–17.7)
HOLD SPECIMEN: NORMAL
IMM GRANULOCYTES # BLD AUTO: 0.02 10*3/MM3 (ref 0–0.05)
IMM GRANULOCYTES # BLD AUTO: 0.03 10*3/MM3 (ref 0–0.05)
IMM GRANULOCYTES NFR BLD AUTO: 0.2 % (ref 0–0.5)
IMM GRANULOCYTES NFR BLD AUTO: 0.3 % (ref 0–0.5)
LDLC SERPL CALC-MCNC: 94 MG/DL (ref 0–100)
LDLC/HDLC SERPL: 2.71 {RATIO}
LIPASE SERPL-CCNC: 23 U/L (ref 13–60)
LIPASE SERPL-CCNC: 24 U/L (ref 13–60)
LYMPHOCYTES # BLD AUTO: 3.01 10*3/MM3 (ref 0.7–3.1)
LYMPHOCYTES # BLD AUTO: 3.07 10*3/MM3 (ref 0.7–3.1)
LYMPHOCYTES NFR BLD AUTO: 34 % (ref 19.6–45.3)
LYMPHOCYTES NFR BLD AUTO: 34.5 % (ref 19.6–45.3)
MAGNESIUM SERPL-MCNC: 2.2 MG/DL (ref 1.6–2.6)
MCH RBC QN AUTO: 31.6 PG (ref 26.6–33)
MCH RBC QN AUTO: 31.8 PG (ref 26.6–33)
MCHC RBC AUTO-ENTMCNC: 35.1 G/DL (ref 31.5–35.7)
MCHC RBC AUTO-ENTMCNC: 35.5 G/DL (ref 31.5–35.7)
MCV RBC AUTO: 89 FL (ref 79–97)
MCV RBC AUTO: 90.6 FL (ref 79–97)
MONOCYTES # BLD AUTO: 0.56 10*3/MM3 (ref 0.1–0.9)
MONOCYTES # BLD AUTO: 0.68 10*3/MM3 (ref 0.1–0.9)
MONOCYTES NFR BLD AUTO: 6.2 % (ref 5–12)
MONOCYTES NFR BLD AUTO: 7.8 % (ref 5–12)
NEUTROPHILS NFR BLD AUTO: 4.69 10*3/MM3 (ref 1.7–7)
NEUTROPHILS NFR BLD AUTO: 5.07 10*3/MM3 (ref 1.7–7)
NEUTROPHILS NFR BLD AUTO: 53.8 % (ref 42.7–76)
NEUTROPHILS NFR BLD AUTO: 56.3 % (ref 42.7–76)
NRBC BLD AUTO-RTO: 0 /100 WBC (ref 0–0.2)
NRBC BLD AUTO-RTO: 0 /100 WBC (ref 0–0.2)
NT-PROBNP SERPL-MCNC: 31.9 PG/ML (ref 0–900)
PLATELET # BLD AUTO: 330 10*3/MM3 (ref 140–450)
PLATELET # BLD AUTO: 332 10*3/MM3 (ref 140–450)
PMV BLD AUTO: 8.9 FL (ref 6–12)
PMV BLD AUTO: 9.4 FL (ref 6–12)
POTASSIUM SERPL-SCNC: 3.3 MMOL/L (ref 3.5–5.2)
POTASSIUM SERPL-SCNC: 3.3 MMOL/L (ref 3.5–5.2)
PROT SERPL-MCNC: 7.8 G/DL (ref 6–8.5)
PROT SERPL-MCNC: 7.8 G/DL (ref 6–8.5)
RBC # BLD AUTO: 4.65 10*6/MM3 (ref 4.14–5.8)
RBC # BLD AUTO: 4.88 10*6/MM3 (ref 4.14–5.8)
SODIUM SERPL-SCNC: 137 MMOL/L (ref 136–145)
SODIUM SERPL-SCNC: 138 MMOL/L (ref 136–145)
TRIGL SERPL-MCNC: 305 MG/DL (ref 0–150)
TROPONIN I SERPL-MCNC: 0.04 NG/ML (ref 0–0.6)
TROPONIN T SERPL-MCNC: <0.01 NG/ML (ref 0–0.03)
VLDLC SERPL-MCNC: 51 MG/DL (ref 5–40)
WBC NRBC COR # BLD: 8.72 10*3/MM3 (ref 3.4–10.8)
WBC NRBC COR # BLD: 9.02 10*3/MM3 (ref 3.4–10.8)
WHOLE BLOOD HOLD SPECIMEN: NORMAL
WHOLE BLOOD HOLD SPECIMEN: NORMAL

## 2022-05-04 PROCEDURE — 83735 ASSAY OF MAGNESIUM: CPT

## 2022-05-04 PROCEDURE — 83735 ASSAY OF MAGNESIUM: CPT | Performed by: HOSPITALIST

## 2022-05-04 PROCEDURE — 80061 LIPID PANEL: CPT | Performed by: HOSPITALIST

## 2022-05-04 PROCEDURE — 82553 CREATINE MB FRACTION: CPT

## 2022-05-04 PROCEDURE — 83036 HEMOGLOBIN GLYCOSYLATED A1C: CPT | Performed by: HOSPITALIST

## 2022-05-04 PROCEDURE — 80053 COMPREHEN METABOLIC PANEL: CPT

## 2022-05-04 PROCEDURE — 93005 ELECTROCARDIOGRAM TRACING: CPT | Performed by: EMERGENCY MEDICINE

## 2022-05-04 PROCEDURE — 83690 ASSAY OF LIPASE: CPT | Performed by: INTERNAL MEDICINE

## 2022-05-04 PROCEDURE — 83880 ASSAY OF NATRIURETIC PEPTIDE: CPT

## 2022-05-04 PROCEDURE — 84484 ASSAY OF TROPONIN QUANT: CPT | Performed by: INTERNAL MEDICINE

## 2022-05-04 PROCEDURE — 99213 OFFICE O/P EST LOW 20 MIN: CPT | Performed by: INTERNAL MEDICINE

## 2022-05-04 PROCEDURE — 99220 PR INITIAL OBSERVATION CARE/DAY 70 MINUTES: CPT | Performed by: HOSPITALIST

## 2022-05-04 PROCEDURE — 71045 X-RAY EXAM CHEST 1 VIEW: CPT

## 2022-05-04 PROCEDURE — 84484 ASSAY OF TROPONIN QUANT: CPT | Performed by: HOSPITALIST

## 2022-05-04 PROCEDURE — 84484 ASSAY OF TROPONIN QUANT: CPT

## 2022-05-04 PROCEDURE — 93000 ELECTROCARDIOGRAM COMPLETE: CPT | Performed by: INTERNAL MEDICINE

## 2022-05-04 PROCEDURE — 84100 ASSAY OF PHOSPHORUS: CPT | Performed by: HOSPITALIST

## 2022-05-04 PROCEDURE — 80053 COMPREHEN METABOLIC PANEL: CPT | Performed by: INTERNAL MEDICINE

## 2022-05-04 PROCEDURE — 83690 ASSAY OF LIPASE: CPT

## 2022-05-04 PROCEDURE — 93005 ELECTROCARDIOGRAM TRACING: CPT

## 2022-05-04 PROCEDURE — 82550 ASSAY OF CK (CPK): CPT

## 2022-05-04 PROCEDURE — 93010 ELECTROCARDIOGRAM REPORT: CPT | Performed by: INTERNAL MEDICINE

## 2022-05-04 PROCEDURE — 82550 ASSAY OF CK (CPK): CPT | Performed by: INTERNAL MEDICINE

## 2022-05-04 PROCEDURE — 99284 EMERGENCY DEPT VISIT MOD MDM: CPT

## 2022-05-04 PROCEDURE — G0378 HOSPITAL OBSERVATION PER HR: HCPCS

## 2022-05-04 PROCEDURE — 85025 COMPLETE CBC W/AUTO DIFF WBC: CPT

## 2022-05-04 PROCEDURE — 80061 LIPID PANEL: CPT | Performed by: INTERNAL MEDICINE

## 2022-05-04 PROCEDURE — 85025 COMPLETE CBC W/AUTO DIFF WBC: CPT | Performed by: INTERNAL MEDICINE

## 2022-05-04 RX ORDER — AMOXICILLIN 250 MG
2 CAPSULE ORAL 2 TIMES DAILY
Status: DISCONTINUED | OUTPATIENT
Start: 2022-05-04 | End: 2022-05-06 | Stop reason: HOSPADM

## 2022-05-04 RX ORDER — CHOLECALCIFEROL (VITAMIN D3) 125 MCG
5 CAPSULE ORAL NIGHTLY PRN
Status: DISCONTINUED | OUTPATIENT
Start: 2022-05-04 | End: 2022-05-06 | Stop reason: HOSPADM

## 2022-05-04 RX ORDER — CHLORTHALIDONE 25 MG/1
25 TABLET ORAL DAILY
Status: DISCONTINUED | OUTPATIENT
Start: 2022-05-05 | End: 2022-05-06 | Stop reason: HOSPADM

## 2022-05-04 RX ORDER — ATORVASTATIN CALCIUM 40 MG/1
40 TABLET, FILM COATED ORAL DAILY
Status: DISCONTINUED | OUTPATIENT
Start: 2022-05-05 | End: 2022-05-06 | Stop reason: HOSPADM

## 2022-05-04 RX ORDER — LOSARTAN POTASSIUM 50 MG/1
100 TABLET ORAL DAILY
Status: DISCONTINUED | OUTPATIENT
Start: 2022-05-05 | End: 2022-05-06 | Stop reason: HOSPADM

## 2022-05-04 RX ORDER — POLYETHYLENE GLYCOL 3350 17 G/17G
17 POWDER, FOR SOLUTION ORAL DAILY PRN
Status: DISCONTINUED | OUTPATIENT
Start: 2022-05-04 | End: 2022-05-06 | Stop reason: HOSPADM

## 2022-05-04 RX ORDER — ASPIRIN 81 MG/1
81 TABLET ORAL DAILY
Status: DISCONTINUED | OUTPATIENT
Start: 2022-05-05 | End: 2022-05-06 | Stop reason: HOSPADM

## 2022-05-04 RX ORDER — HYDROCODONE BITARTRATE AND ACETAMINOPHEN 5; 325 MG/1; MG/1
1 TABLET ORAL EVERY 4 HOURS PRN
Status: DISCONTINUED | OUTPATIENT
Start: 2022-05-04 | End: 2022-05-06 | Stop reason: HOSPADM

## 2022-05-04 RX ORDER — POTASSIUM CHLORIDE 750 MG/1
40 CAPSULE, EXTENDED RELEASE ORAL ONCE
Status: COMPLETED | OUTPATIENT
Start: 2022-05-04 | End: 2022-05-04

## 2022-05-04 RX ORDER — NITROGLYCERIN 0.4 MG/1
0.4 TABLET SUBLINGUAL
Status: DISCONTINUED | OUTPATIENT
Start: 2022-05-04 | End: 2022-05-06 | Stop reason: HOSPADM

## 2022-05-04 RX ORDER — BISACODYL 5 MG/1
5 TABLET, DELAYED RELEASE ORAL DAILY PRN
Status: DISCONTINUED | OUTPATIENT
Start: 2022-05-04 | End: 2022-05-06 | Stop reason: HOSPADM

## 2022-05-04 RX ORDER — MORPHINE SULFATE 2 MG/ML
1 INJECTION, SOLUTION INTRAMUSCULAR; INTRAVENOUS EVERY 4 HOURS PRN
Status: DISCONTINUED | OUTPATIENT
Start: 2022-05-04 | End: 2022-05-05

## 2022-05-04 RX ORDER — NALOXONE HCL 0.4 MG/ML
0.4 VIAL (ML) INJECTION
Status: DISCONTINUED | OUTPATIENT
Start: 2022-05-04 | End: 2022-05-05

## 2022-05-04 RX ORDER — BISACODYL 10 MG
10 SUPPOSITORY, RECTAL RECTAL DAILY PRN
Status: DISCONTINUED | OUTPATIENT
Start: 2022-05-04 | End: 2022-05-06 | Stop reason: HOSPADM

## 2022-05-04 RX ORDER — TOPIRAMATE 25 MG/1
25 TABLET ORAL 2 TIMES DAILY
Status: DISCONTINUED | OUTPATIENT
Start: 2022-05-04 | End: 2022-05-06 | Stop reason: HOSPADM

## 2022-05-04 RX ORDER — ALBUTEROL SULFATE 90 UG/1
2 AEROSOL, METERED RESPIRATORY (INHALATION) EVERY 6 HOURS PRN
Status: DISCONTINUED | OUTPATIENT
Start: 2022-05-04 | End: 2022-05-06 | Stop reason: HOSPADM

## 2022-05-04 RX ORDER — SODIUM CHLORIDE 0.9 % (FLUSH) 0.9 %
10 SYRINGE (ML) INJECTION AS NEEDED
Status: DISCONTINUED | OUTPATIENT
Start: 2022-05-04 | End: 2022-05-06 | Stop reason: HOSPADM

## 2022-05-04 RX ORDER — SODIUM CHLORIDE 0.9 % (FLUSH) 0.9 %
10 SYRINGE (ML) INJECTION EVERY 12 HOURS SCHEDULED
Status: DISCONTINUED | OUTPATIENT
Start: 2022-05-04 | End: 2022-05-06 | Stop reason: HOSPADM

## 2022-05-04 RX ORDER — VENLAFAXINE HYDROCHLORIDE 75 MG/1
75 CAPSULE, EXTENDED RELEASE ORAL
Status: DISCONTINUED | OUTPATIENT
Start: 2022-05-05 | End: 2022-05-06 | Stop reason: HOSPADM

## 2022-05-04 RX ORDER — TOPIRAMATE 25 MG/1
25 TABLET ORAL 2 TIMES DAILY
Qty: 180 TABLET | Refills: 0 | Status: SHIPPED | OUTPATIENT
Start: 2022-05-04 | End: 2022-09-13 | Stop reason: SDUPTHER

## 2022-05-04 RX ORDER — CARVEDILOL 6.25 MG/1
6.25 TABLET ORAL 2 TIMES DAILY WITH MEALS
Status: DISCONTINUED | OUTPATIENT
Start: 2022-05-04 | End: 2022-05-06 | Stop reason: HOSPADM

## 2022-05-04 RX ORDER — ALUMINA, MAGNESIA, AND SIMETHICONE 2400; 2400; 240 MG/30ML; MG/30ML; MG/30ML
15 SUSPENSION ORAL EVERY 6 HOURS PRN
Status: DISCONTINUED | OUTPATIENT
Start: 2022-05-04 | End: 2022-05-06 | Stop reason: HOSPADM

## 2022-05-04 RX ORDER — ONDANSETRON 4 MG/1
4 TABLET, FILM COATED ORAL EVERY 6 HOURS PRN
Status: DISCONTINUED | OUTPATIENT
Start: 2022-05-04 | End: 2022-05-05

## 2022-05-04 RX ORDER — ACETAMINOPHEN 500 MG
1000 TABLET ORAL 3 TIMES DAILY
Status: DISCONTINUED | OUTPATIENT
Start: 2022-05-04 | End: 2022-05-06 | Stop reason: HOSPADM

## 2022-05-04 RX ORDER — LEVOTHYROXINE SODIUM 0.07 MG/1
75 TABLET ORAL EVERY MORNING
Status: DISCONTINUED | OUTPATIENT
Start: 2022-05-05 | End: 2022-05-06 | Stop reason: HOSPADM

## 2022-05-04 RX ORDER — SODIUM CHLORIDE 9 MG/ML
75 INJECTION, SOLUTION INTRAVENOUS CONTINUOUS
Status: DISCONTINUED | OUTPATIENT
Start: 2022-05-04 | End: 2022-05-06 | Stop reason: HOSPADM

## 2022-05-04 RX ORDER — ONDANSETRON 2 MG/ML
4 INJECTION INTRAMUSCULAR; INTRAVENOUS EVERY 6 HOURS PRN
Status: DISCONTINUED | OUTPATIENT
Start: 2022-05-04 | End: 2022-05-05

## 2022-05-04 RX ORDER — ASPIRIN 81 MG/1
324 TABLET, CHEWABLE ORAL ONCE
Status: COMPLETED | OUTPATIENT
Start: 2022-05-04 | End: 2022-05-04

## 2022-05-04 RX ORDER — ENOXAPARIN SODIUM 100 MG/ML
40 INJECTION SUBCUTANEOUS DAILY
Status: DISCONTINUED | OUTPATIENT
Start: 2022-05-05 | End: 2022-05-05

## 2022-05-04 RX ADMIN — POTASSIUM CHLORIDE 40 MEQ: 10 CAPSULE, COATED, EXTENDED RELEASE ORAL at 23:19

## 2022-05-04 RX ADMIN — NITROGLYCERIN 0.4 MG: 0.4 TABLET SUBLINGUAL at 20:47

## 2022-05-04 RX ADMIN — ASPIRIN 81 MG 324 MG: 81 TABLET ORAL at 19:39

## 2022-05-04 NOTE — PROGRESS NOTES
"Chief Complaint  Follow-up (2 Month), Hyperlipidemia, Weight Loss, and Chest Pain (Started 5 Days)    Subjective          Yousuf Em presents to [facility]  History of Present Illness     Chest pain that started 5 days ago  Crushing and radiating to his jaws and was very severe  Doesn't feel like indigestion  No nausea  No diaphoresis  Happened again yesterday and a few other times that were not as intense    Exertion doesn't seem to make it worse    Objective   Vital Signs:   /80 (BP Location: Right arm, Patient Position: Sitting, Cuff Size: Adult)   Pulse 88   Temp 97.5 °F (36.4 °C)   Resp 18   Ht 172.7 cm (68\")   Wt 123 kg (272 lb)   SpO2 97%   BMI 41.36 kg/m²     Physical Exam  Vitals reviewed.   Constitutional:       Appearance: Normal appearance. He is well-developed.   HENT:      Head: Normocephalic and atraumatic.      Right Ear: External ear normal.      Left Ear: External ear normal.   Eyes:      Conjunctiva/sclera: Conjunctivae normal.      Pupils: Pupils are equal, round, and reactive to light.   Cardiovascular:      Rate and Rhythm: Normal rate and regular rhythm.      Heart sounds: No murmur heard.    No friction rub. No gallop.   Pulmonary:      Effort: Pulmonary effort is normal.      Breath sounds: Normal breath sounds. No wheezing or rhonchi.   Skin:     General: Skin is warm and dry.   Neurological:      Mental Status: He is alert and oriented to person, place, and time.   Psychiatric:         Mood and Affect: Affect normal.         Behavior: Behavior normal.         Thought Content: Thought content normal.        Result Review :       Common labs    Common Labsle 5/4/22 5/4/22 5/4/22 5/4/22 5/4/22 5/4/22 5/4/22 5/5/22 5/5/22 5/6/22 5/6/22    1725 1725 1725 1928 1928 1928 1928 0512 0512 0415 0415   Glucose  97   107 (A)    100 (A)  120 (A)   BUN  18   20    18  15   Creatinine  1.29 (A)   1.15    1.05  1.10   Sodium  138   137    139  " 138   Potassium  3.3 (A)   3.3 (A)    3.6  3.5   Chloride  102   100    103  103   Calcium  9.5   9.4    9.3  9.4   Albumin  4.40   4.50         Total Bilirubin  0.4   0.5         Alkaline Phosphatase  85   85         AST (SGOT)  25   29         ALT (SGPT)  43 (A)   41         WBC 8.72   9.02    7.43  7.30    Hemoglobin 15.5   14.7    13.9  14.1    Hematocrit 44.2   41.4    38.9  39.1    Platelets 332   330    290  296    Total Cholesterol   176   169        Triglycerides   305 (A)   232 (A)        HDL Cholesterol   31 (A)   33 (A)        LDL Cholesterol    94   96        Hemoglobin A1C       6.30 (A)       (A) Abnormal value       Comments are available for some flowsheets but are not being displayed.             Results for orders placed or performed in visit on 05/04/22   Comprehensive metabolic panel    Specimen: Hand, Left; Blood   Result Value Ref Range    Glucose 97 65 - 99 mg/dL    BUN 18 6 - 20 mg/dL    Creatinine 1.29 (H) 0.76 - 1.27 mg/dL    Sodium 138 136 - 145 mmol/L    Potassium 3.3 (L) 3.5 - 5.2 mmol/L    Chloride 102 98 - 107 mmol/L    CO2 23.0 22.0 - 29.0 mmol/L    Calcium 9.5 8.6 - 10.5 mg/dL    Total Protein 7.8 6.0 - 8.5 g/dL    Albumin 4.40 3.50 - 5.20 g/dL    ALT (SGPT) 43 (H) 1 - 41 U/L    AST (SGOT) 25 1 - 40 U/L    Alkaline Phosphatase 85 39 - 117 U/L    Total Bilirubin 0.4 0.0 - 1.2 mg/dL    Globulin 3.4 gm/dL    A/G Ratio 1.3 g/dL    BUN/Creatinine Ratio 14.0 7.0 - 25.0    Anion Gap 13.0 5.0 - 15.0 mmol/L    eGFR 67.1 >60.0 mL/min/1.73   CK    Specimen: Hand, Left; Blood   Result Value Ref Range    Creatine Kinase 233 (H) 20 - 200 U/L   Troponin    Specimen: Hand, Left; Blood   Result Value Ref Range    Troponin T <0.010 0.000 - 0.030 ng/mL   Lipase    Specimen: Hand, Left; Blood   Result Value Ref Range    Lipase 24 13 - 60 U/L   Lipid panel    Specimen: Hand, Left; Blood   Result Value Ref Range    Total Cholesterol 176 0 - 200 mg/dL    Triglycerides 305 (H) 0 - 150 mg/dL    HDL  Cholesterol 31 (L) 40 - 60 mg/dL    LDL Cholesterol  94 0 - 100 mg/dL    VLDL Cholesterol 51 (H) 5 - 40 mg/dL    LDL/HDL Ratio 2.71    CBC Auto Differential    Specimen: Hand, Left; Blood   Result Value Ref Range    WBC 8.72 3.40 - 10.80 10*3/mm3    RBC 4.88 4.14 - 5.80 10*6/mm3    Hemoglobin 15.5 13.0 - 17.7 g/dL    Hematocrit 44.2 37.5 - 51.0 %    MCV 90.6 79.0 - 97.0 fL    MCH 31.8 26.6 - 33.0 pg    MCHC 35.1 31.5 - 35.7 g/dL    RDW 13.3 12.3 - 15.4 %    RDW-SD 43.7 37.0 - 54.0 fl    MPV 9.4 6.0 - 12.0 fL    Platelets 332 140 - 450 10*3/mm3    Neutrophil % 53.8 42.7 - 76.0 %    Lymphocyte % 34.5 19.6 - 45.3 %    Monocyte % 7.8 5.0 - 12.0 %    Eosinophil % 3.0 0.3 - 6.2 %    Basophil % 0.7 0.0 - 1.5 %    Immature Grans % 0.2 0.0 - 0.5 %    Neutrophils, Absolute 4.69 1.70 - 7.00 10*3/mm3    Lymphocytes, Absolute 3.01 0.70 - 3.10 10*3/mm3    Monocytes, Absolute 0.68 0.10 - 0.90 10*3/mm3    Eosinophils, Absolute 0.26 0.00 - 0.40 10*3/mm3    Basophils, Absolute 0.06 0.00 - 0.20 10*3/mm3    Immature Grans, Absolute 0.02 0.00 - 0.05 10*3/mm3    nRBC 0.0 0.0 - 0.2 /100 WBC              ECG 12 Lead    Date/Time: 5/4/2022 6:24 PM  Performed by: Angela Smith MD  Authorized by: Angela Smith MD   Comparison: not compared with previous ECG   Previous ECG: no previous ECG available  Rhythm: sinus rhythm  Rate: normal  ST Segments: ST segments normal  QRS axis: normal    Clinical impression: normal ECG  Comments: q wave in inferior leads                Assessment and Plan    Diagnoses and all orders for this visit:    1. Stable angina (HCC) (Primary)  Comments:  Spoke with Dr. Cordero who agreed pt needs expedited cardiac work up.  The best way to get this currently is to go to the ER.  Orders:  -     Cancel: Ambulatory Referral to Cardiology  -     Ambulatory Referral to Cardiology  -     Troponin; Future  -     Lipase; Future  -     Troponin  -     Lipase  -     ECG 12 Lead    2. Chest pain, unspecified type  -      Ambulatory Referral to Cardiology  -     CBC w AUTO Differential; Future  -     Comprehensive metabolic panel; Future  -     CK; Future  -     Lipid panel; Future  -     CBC w AUTO Differential  -     Comprehensive metabolic panel  -     CK  -     Lipid panel  -     ECG 12 Lead    Initially tried to plan expedited outpatient work up, but as this was not manageable called patient and instructed him to go to the ER ASAP.  He stated understanding and will have his wife drive him there.              Follow Up   No follow-ups on file.  Patient was given instructions and counseling regarding his condition or for health maintenance advice. Please see specific information pulled into the AVS if appropriate.

## 2022-05-05 ENCOUNTER — APPOINTMENT (OUTPATIENT)
Dept: CARDIOLOGY | Facility: HOSPITAL | Age: 52
End: 2022-05-05

## 2022-05-05 PROBLEM — I20.0 UNSTABLE ANGINA (HCC): Status: ACTIVE | Noted: 2022-05-05

## 2022-05-05 PROBLEM — R07.9 CHEST PAIN, UNSPECIFIED TYPE: Status: ACTIVE | Noted: 2022-05-05

## 2022-05-05 LAB
ACT BLD: 196 SECONDS (ref 89–137)
ACT BLD: 297 SECONDS (ref 89–137)
ANION GAP SERPL CALCULATED.3IONS-SCNC: 9.6 MMOL/L (ref 5–15)
BASOPHILS # BLD AUTO: 0.05 10*3/MM3 (ref 0–0.2)
BASOPHILS NFR BLD AUTO: 0.7 % (ref 0–1.5)
BUN SERPL-MCNC: 18 MG/DL (ref 6–20)
BUN/CREAT SERPL: 17.1 (ref 7–25)
CALCIUM SPEC-SCNC: 9.3 MG/DL (ref 8.6–10.5)
CHLORIDE SERPL-SCNC: 103 MMOL/L (ref 98–107)
CHOLEST SERPL-MCNC: 169 MG/DL (ref 0–200)
CO2 SERPL-SCNC: 26.4 MMOL/L (ref 22–29)
CREAT SERPL-MCNC: 1.05 MG/DL (ref 0.76–1.27)
DEPRECATED RDW RBC AUTO: 41.8 FL (ref 37–54)
EGFRCR SERPLBLD CKD-EPI 2021: 85.9 ML/MIN/1.73
EOSINOPHIL # BLD AUTO: 0.24 10*3/MM3 (ref 0–0.4)
EOSINOPHIL NFR BLD AUTO: 3.2 % (ref 0.3–6.2)
ERYTHROCYTE [DISTWIDTH] IN BLOOD BY AUTOMATED COUNT: 12.8 % (ref 12.3–15.4)
GLUCOSE SERPL-MCNC: 100 MG/DL (ref 65–99)
HBA1C MFR BLD: 6.3 % (ref 4.8–5.6)
HCT VFR BLD AUTO: 38.9 % (ref 37.5–51)
HDLC SERPL-MCNC: 33 MG/DL (ref 40–60)
HGB BLD-MCNC: 13.9 G/DL (ref 13–17.7)
IMM GRANULOCYTES # BLD AUTO: 0.02 10*3/MM3 (ref 0–0.05)
IMM GRANULOCYTES NFR BLD AUTO: 0.3 % (ref 0–0.5)
LDLC SERPL CALC-MCNC: 96 MG/DL (ref 0–100)
LDLC/HDLC SERPL: 2.72 {RATIO}
LYMPHOCYTES # BLD AUTO: 2.46 10*3/MM3 (ref 0.7–3.1)
LYMPHOCYTES NFR BLD AUTO: 33.1 % (ref 19.6–45.3)
MAGNESIUM SERPL-MCNC: 2.3 MG/DL (ref 1.6–2.6)
MAGNESIUM SERPL-MCNC: 2.4 MG/DL (ref 1.6–2.6)
MCH RBC QN AUTO: 31.9 PG (ref 26.6–33)
MCHC RBC AUTO-ENTMCNC: 35.7 G/DL (ref 31.5–35.7)
MCV RBC AUTO: 89.2 FL (ref 79–97)
MONOCYTES # BLD AUTO: 0.72 10*3/MM3 (ref 0.1–0.9)
MONOCYTES NFR BLD AUTO: 9.7 % (ref 5–12)
NEUTROPHILS NFR BLD AUTO: 3.94 10*3/MM3 (ref 1.7–7)
NEUTROPHILS NFR BLD AUTO: 53 % (ref 42.7–76)
NRBC BLD AUTO-RTO: 0 /100 WBC (ref 0–0.2)
PHOSPHATE SERPL-MCNC: 3.5 MG/DL (ref 2.5–4.5)
PHOSPHATE SERPL-MCNC: 3.7 MG/DL (ref 2.5–4.5)
PLATELET # BLD AUTO: 290 10*3/MM3 (ref 140–450)
PMV BLD AUTO: 8.9 FL (ref 6–12)
POTASSIUM SERPL-SCNC: 3.6 MMOL/L (ref 3.5–5.2)
RBC # BLD AUTO: 4.36 10*6/MM3 (ref 4.14–5.8)
SODIUM SERPL-SCNC: 139 MMOL/L (ref 136–145)
TRIGL SERPL-MCNC: 232 MG/DL (ref 0–150)
TROPONIN T SERPL-MCNC: <0.01 NG/ML (ref 0–0.03)
TROPONIN T SERPL-MCNC: <0.01 NG/ML (ref 0–0.03)
VLDLC SERPL-MCNC: 40 MG/DL (ref 5–40)
WBC NRBC COR # BLD: 7.43 10*3/MM3 (ref 3.4–10.8)

## 2022-05-05 PROCEDURE — 84484 ASSAY OF TROPONIN QUANT: CPT | Performed by: HOSPITALIST

## 2022-05-05 PROCEDURE — 99152 MOD SED SAME PHYS/QHP 5/>YRS: CPT | Performed by: INTERNAL MEDICINE

## 2022-05-05 PROCEDURE — 0 IOPAMIDOL PER 1 ML: Performed by: INTERNAL MEDICINE

## 2022-05-05 PROCEDURE — 84100 ASSAY OF PHOSPHORUS: CPT | Performed by: HOSPITALIST

## 2022-05-05 PROCEDURE — 85025 COMPLETE CBC W/AUTO DIFF WBC: CPT | Performed by: HOSPITALIST

## 2022-05-05 PROCEDURE — 83735 ASSAY OF MAGNESIUM: CPT | Performed by: HOSPITALIST

## 2022-05-05 PROCEDURE — C1725 CATH, TRANSLUMIN NON-LASER: HCPCS | Performed by: INTERNAL MEDICINE

## 2022-05-05 PROCEDURE — C1887 CATHETER, GUIDING: HCPCS | Performed by: INTERNAL MEDICINE

## 2022-05-05 PROCEDURE — 93306 TTE W/DOPPLER COMPLETE: CPT | Performed by: INTERNAL MEDICINE

## 2022-05-05 PROCEDURE — 99204 OFFICE O/P NEW MOD 45 MIN: CPT | Performed by: INTERNAL MEDICINE

## 2022-05-05 PROCEDURE — 25010000002 HEPARIN (PORCINE) PER 1000 UNITS: Performed by: INTERNAL MEDICINE

## 2022-05-05 PROCEDURE — 99226 PR SBSQ OBSERVATION CARE/DAY 35 MINUTES: CPT | Performed by: INTERNAL MEDICINE

## 2022-05-05 PROCEDURE — 80048 BASIC METABOLIC PNL TOTAL CA: CPT | Performed by: HOSPITALIST

## 2022-05-05 PROCEDURE — C1769 GUIDE WIRE: HCPCS | Performed by: INTERNAL MEDICINE

## 2022-05-05 PROCEDURE — 25010000002 FENTANYL CITRATE (PF) 100 MCG/2ML SOLUTION: Performed by: INTERNAL MEDICINE

## 2022-05-05 PROCEDURE — 85347 COAGULATION TIME ACTIVATED: CPT

## 2022-05-05 PROCEDURE — 93458 L HRT ARTERY/VENTRICLE ANGIO: CPT | Performed by: INTERNAL MEDICINE

## 2022-05-05 PROCEDURE — 25010000002 MIDAZOLAM PER 1 MG: Performed by: INTERNAL MEDICINE

## 2022-05-05 PROCEDURE — G0378 HOSPITAL OBSERVATION PER HR: HCPCS

## 2022-05-05 PROCEDURE — C9600 PERC DRUG-EL COR STENT SING: HCPCS | Performed by: INTERNAL MEDICINE

## 2022-05-05 PROCEDURE — C1874 STENT, COATED/COV W/DEL SYS: HCPCS | Performed by: INTERNAL MEDICINE

## 2022-05-05 PROCEDURE — 93306 TTE W/DOPPLER COMPLETE: CPT

## 2022-05-05 PROCEDURE — C1894 INTRO/SHEATH, NON-LASER: HCPCS | Performed by: INTERNAL MEDICINE

## 2022-05-05 PROCEDURE — 99153 MOD SED SAME PHYS/QHP EA: CPT | Performed by: INTERNAL MEDICINE

## 2022-05-05 PROCEDURE — 92928 PRQ TCAT PLMT NTRAC ST 1 LES: CPT | Performed by: INTERNAL MEDICINE

## 2022-05-05 DEVICE — XIENCE SKYPOINT™ EVEROLIMUS ELUTING CORONARY STENT SYSTEM 3.00 MM X 28 MM / RAPID-EXCHANGE
Type: IMPLANTABLE DEVICE | Status: FUNCTIONAL
Brand: XIENCE SKYPOINT™

## 2022-05-05 RX ORDER — ACETAMINOPHEN 325 MG/1
650 TABLET ORAL EVERY 4 HOURS PRN
Status: DISCONTINUED | OUTPATIENT
Start: 2022-05-05 | End: 2022-05-06 | Stop reason: HOSPADM

## 2022-05-05 RX ORDER — MORPHINE SULFATE 2 MG/ML
1 INJECTION, SOLUTION INTRAMUSCULAR; INTRAVENOUS EVERY 4 HOURS PRN
Status: DISCONTINUED | OUTPATIENT
Start: 2022-05-05 | End: 2022-05-06 | Stop reason: HOSPADM

## 2022-05-05 RX ORDER — NALOXONE HCL 0.4 MG/ML
0.4 VIAL (ML) INJECTION
Status: DISCONTINUED | OUTPATIENT
Start: 2022-05-05 | End: 2022-05-06 | Stop reason: HOSPADM

## 2022-05-05 RX ORDER — ONDANSETRON 2 MG/ML
4 INJECTION INTRAMUSCULAR; INTRAVENOUS EVERY 6 HOURS PRN
Status: DISCONTINUED | OUTPATIENT
Start: 2022-05-05 | End: 2022-05-06 | Stop reason: HOSPADM

## 2022-05-05 RX ORDER — SODIUM CHLORIDE 9 MG/ML
75 INJECTION, SOLUTION INTRAVENOUS CONTINUOUS
Status: CANCELLED | OUTPATIENT
Start: 2022-05-05

## 2022-05-05 RX ORDER — FENTANYL CITRATE 50 UG/ML
INJECTION, SOLUTION INTRAMUSCULAR; INTRAVENOUS AS NEEDED
Status: DISCONTINUED | OUTPATIENT
Start: 2022-05-05 | End: 2022-05-05 | Stop reason: HOSPADM

## 2022-05-05 RX ORDER — ONDANSETRON 4 MG/1
4 TABLET, FILM COATED ORAL EVERY 6 HOURS PRN
Status: DISCONTINUED | OUTPATIENT
Start: 2022-05-05 | End: 2022-05-06 | Stop reason: HOSPADM

## 2022-05-05 RX ORDER — SODIUM CHLORIDE 0.9 % (FLUSH) 0.9 %
10 SYRINGE (ML) INJECTION AS NEEDED
Status: CANCELLED | OUTPATIENT
Start: 2022-05-05

## 2022-05-05 RX ORDER — LIDOCAINE HYDROCHLORIDE 20 MG/ML
INJECTION, SOLUTION INFILTRATION; PERINEURAL AS NEEDED
Status: DISCONTINUED | OUTPATIENT
Start: 2022-05-05 | End: 2022-05-05 | Stop reason: HOSPADM

## 2022-05-05 RX ORDER — SODIUM CHLORIDE 0.9 % (FLUSH) 0.9 %
10 SYRINGE (ML) INJECTION EVERY 12 HOURS SCHEDULED
Status: CANCELLED | OUTPATIENT
Start: 2022-05-05

## 2022-05-05 RX ORDER — MIDAZOLAM HYDROCHLORIDE 1 MG/ML
INJECTION INTRAMUSCULAR; INTRAVENOUS AS NEEDED
Status: DISCONTINUED | OUTPATIENT
Start: 2022-05-05 | End: 2022-05-05 | Stop reason: HOSPADM

## 2022-05-05 RX ORDER — HEPARIN SODIUM 1000 [USP'U]/ML
INJECTION, SOLUTION INTRAVENOUS; SUBCUTANEOUS AS NEEDED
Status: DISCONTINUED | OUTPATIENT
Start: 2022-05-05 | End: 2022-05-05 | Stop reason: HOSPADM

## 2022-05-05 RX ORDER — SODIUM CHLORIDE 9 MG/ML
100 INJECTION, SOLUTION INTRAVENOUS CONTINUOUS
Status: ACTIVE | OUTPATIENT
Start: 2022-05-05 | End: 2022-05-05

## 2022-05-05 RX ADMIN — SODIUM CHLORIDE 75 ML/HR: 9 INJECTION, SOLUTION INTRAVENOUS at 00:37

## 2022-05-05 RX ADMIN — CARVEDILOL 6.25 MG: 6.25 TABLET, FILM COATED ORAL at 17:12

## 2022-05-05 RX ADMIN — Medication 10 ML: at 09:01

## 2022-05-05 RX ADMIN — TOPIRAMATE 25 MG: 25 TABLET, FILM COATED ORAL at 09:05

## 2022-05-05 RX ADMIN — ATORVASTATIN CALCIUM 40 MG: 40 TABLET, FILM COATED ORAL at 09:00

## 2022-05-05 RX ADMIN — CARVEDILOL 6.25 MG: 6.25 TABLET, FILM COATED ORAL at 09:00

## 2022-05-05 RX ADMIN — TOPIRAMATE 25 MG: 25 TABLET, FILM COATED ORAL at 20:24

## 2022-05-05 RX ADMIN — TICAGRELOR 90 MG: 90 TABLET ORAL at 20:24

## 2022-05-05 RX ADMIN — ASPIRIN 81 MG: 81 TABLET, COATED ORAL at 09:00

## 2022-05-05 RX ADMIN — LEVOTHYROXINE SODIUM 75 MCG: 75 TABLET ORAL at 06:07

## 2022-05-05 RX ADMIN — CHLORTHALIDONE 25 MG: 25 TABLET ORAL at 09:01

## 2022-05-05 RX ADMIN — ACETAMINOPHEN 1000 MG: 500 TABLET ORAL at 09:01

## 2022-05-05 RX ADMIN — SENNOSIDES AND DOCUSATE SODIUM 2 TABLET: 50; 8.6 TABLET ORAL at 09:01

## 2022-05-05 RX ADMIN — SODIUM CHLORIDE 100 ML/HR: 9 INJECTION, SOLUTION INTRAVENOUS at 14:23

## 2022-05-05 RX ADMIN — Medication 10 ML: at 00:36

## 2022-05-05 RX ADMIN — Medication 10 ML: at 20:25

## 2022-05-05 RX ADMIN — ACETAMINOPHEN 1000 MG: 500 TABLET ORAL at 17:12

## 2022-05-05 RX ADMIN — VENLAFAXINE HYDROCHLORIDE 75 MG: 75 CAPSULE, EXTENDED RELEASE ORAL at 09:00

## 2022-05-05 RX ADMIN — LOSARTAN POTASSIUM 100 MG: 50 TABLET, FILM COATED ORAL at 09:00

## 2022-05-05 NOTE — PROGRESS NOTES
[facility]   Hospitalist Progress Note  Date: 2022  Patient Name: Yousuf Em  : 1970  MRN: [id number]  Date of admission: 2022  Consultants:   -Cardiology: Dr. Jake Cordero    Subjective   Subjective     Chief Complaint: Chest pain    Summary:   Yousuf Em is a 51 y.o. male with hypothyroidism, hyperlipidemia, hypertension who presented to the ED with intermittent chest pain that began on 2022, he was seen by PCP who discussed case with cardiology and patient was referred to ED for further evaluation.  Evaluation in ED showed patient to have initial troponin of 0.04 and EKG showed some inferior Q waves as well as mild ST depression in the precordial leads.  Hospitalist service contacted for further evaluation management.  Cardiology consulted to assist in care.    Interval Followup:   No acute events overnight.  Patient states that he is continue to have intermittent episodes of chest pain throughout the night but did not have any active chest pain this morning at time of exam.  Patient did receive one-time dose of as needed nitro x1 since admission for chest pain.  He denied any shortness of breath, abdominal pain, nausea or vomiting.  Cardiology following and plan for patient to have cardiac catheterization today (2022).  Nursing with no additional acute issues to report.    Pain Medication:   -IV morphine  -[place]    Review of Systems   All systems reviewed and negative unless stated otherwise under subjective.    Objective   Objective     Vitals:   Temp:  [97.5 °F (36.4 °C)-98.9 °F (37.2 °C)] 98.2 °F (36.8 °C)  Heart Rate:  [59-88] 71  Resp:  [18] 18  BP: (101-153)/(70-97) 127/82  Physical Exam   Gen: No acute distress, Conversant, Pleasant, lying in bed using his iPad  HEENT: MMM, Atraumatic  Neck: Supple, Trachea midline  Resp: CTAB, No w/r/r, good aeration, equal chest rise bilaterally, no respiratory distress appreciated  Card: RRR, No m/r/g  Abd: Soft, Nontender,  Nondistended, + bowel sounds  Ext: No cyanosis, No clubbing  Neuro: CN II-XII grossly intact, No focal deficits appreciated  Psych: AAO x 3, Normal mood, Normal affect    Result Review    Result Review:  I have personally reviewed the results from the time of this admission to 5/5/2022 10:00 EDT and agree with these findings:  [x]  Laboratory: Troponin has remained negative.  Potassium low normal at 3.6.  Creatinine improved.  Other electrolytes stable.  WC, hemoglobin and platelets stable.  []  Microbiology:   []  Radiology:   [x]  EKG/Telemetry: Sinus rhythm.  No acute events.  []  Cardiology/Vascular:    []  Pathology:  []  Old records:  []  Other:    Assessment/Plan   Assessment / Plan     Assessment:  Unstable angina  Hypertension  Hyperlipidemia  Hypokalemia, improved  Hypothyroidism  Morbid obesity with BMI: 40.04    Plan:  -Cardiology consulted and following, appreciate assistance and recommendations in the care of this patient.  -Patient to have cardiac catheterization today (05/05/2022), follow-up results and subsequent recommendations  -Continue as needed IV morphine and nitro for chest pain  -Continue aspirin, atorvastatin, losartan, carvedilol and chlorthalidone  -Continue appropriate home medications  -Will monitor electrolytes and renal function with BMP and magnesium level in the AM  -Will monitor WBC and Hgb with CBC in the AM  -Clinical course will dictate further management     DVT Prophylaxis: Lovenox  Diet: NPO  Dispo: Home when medically approved for discharge  Code Status: Full code     Personally reviewed patients labs and imaging, discussed with patient and nurse at bedside. Discussed case with the following consultants: Cardiology.     Part of this note may be an electronic transcription/translation of spoken language to printed text using the Dragon dictation system.    DVT prophylaxis:  Medical DVT prophylaxis orders are present.    CODE STATUS:   Level Of Support Discussed With:  Patient  Code Status (Patient has no pulse and is not breathing): CPR (Attempt to Resuscitate)  Medical Interventions (Patient has pulse or is breathing): Full Support        Electronically signed by Willis Metcalf MD, 05/05/22, 10:00 AM EDT.

## 2022-05-05 NOTE — CONSULTS
Cardiology Consult Note  [facility] PROGRESSIVE CARE UNIT          Patient Identification:  Yousuf Em      [id number]  51 y.o.        male  1970       Date of Consultation: May 5    Reason for Consultation: Unstable angina    PCP: Angela Smith MD  Primary cardiologist: None    History of Present Illness:     51-year-old white male, he has hypertension and high cholesterol.  He has sleep apnea.  Significant family history of early coronary events.  He presents with multiple episodes of progressive severe chest pressure with bilateral jaw discomfort squeezing pain radiating to the left shoulder at rest over the past 4 to 5 days.  Symptoms are worsening in severity.  He saw primary care yesterday and EKG showed nonspecific ST depression.  I consulted with his primary care over the phone and rather than await elective office consultation I recommended admission for further evaluation.  Initial troponin negative.    Past History:  Past Medical History:   Diagnosis Date   • AR (allergic rhinitis)    • Arthritis    • Colitis    • Depression    • ETD (eustachian tube dysfunction)    • Forgetfulness    • Hearing loss    • High cholesterol    • Hyperlipemia    • Hypertension    • Labral tear of long head of biceps tendon, initial encounter 09/30/2015   • Labral tear of shoulder, left, sequela 09/25/2015   • Right lateral epicondylitis 10/21/2016   • Seasonal allergies    • Thyroid disorder      Past Surgical History:   Procedure Laterality Date   • CHOLECYSTECTOMY  2000   • COLONOSCOPY     • EAR TUBES     • GALLBLADDER SURGERY     • OTHER SURGICAL HISTORY      JOINT SURGERY     No Known Allergies  Social History     Socioeconomic History   • Marital status: Single   Tobacco Use   • Smoking status: Never Smoker   • Smokeless tobacco: Never Used   Vaping Use   • Vaping Use: Never used   Substance and Sexual Activity   • Alcohol use: Never   • Drug use: Never   • Sexual activity: Never     Family  History   Problem Relation Age of Onset   • Arthritis Mother    • Cancer Mother    • Heart disease Mother    • Diabetes Mother    • Heart disease Father    • Heart disease Sister    • Stroke Maternal Grandmother      Medications:  Medications Prior to Admission   Medication Sig Dispense Refill Last Dose   • atorvastatin (LIPITOR) 20 MG tablet Take 1 tablet by mouth Daily. (Patient taking differently: Take 20 mg by mouth Every Night.) 90 tablet 1 5/3/2022 at Unknown time   • carvedilol (COREG) 6.25 MG tablet Take 1 tablet by mouth 2 (Two) Times a Day With Meals. 180 tablet 0 5/4/2022 at Unknown time   • chlorthalidone (HYGROTON) 25 MG tablet TAKE ONE TABLET BY MOUTH DAILY (Patient taking differently: Take 25 mg by mouth 2 (Two) Times a Day.) 90 tablet 0 5/4/2022 at Unknown time   • ciprofloxacin-dexamethasone (Ciprodex) 0.3-0.1 % otic suspension Administer 4 drops into both ears 2 (Two) Times a Day. 7.5 mL 1 Past Month at Unknown time   • desvenlafaxine (PRISTIQ) 100 MG 24 hr tablet Take 1 tablet by mouth Daily. 90 tablet 1 5/4/2022 at Unknown time   • fexofenadine (ALLEGRA) 180 MG tablet Take 180 mg by mouth Daily.   5/4/2022 at Unknown time   • levothyroxine (SYNTHROID, LEVOTHROID) 75 MCG tablet Take 1 tablet by mouth Daily for 90 days. 90 tablet 0 5/4/2022 at Unknown time   • losartan (COZAAR) 50 MG tablet TAKE TWO TABLETS BY MOUTH DAILY (Patient taking differently: Take 50 mg by mouth 2 (Two) Times a Day.) 180 tablet 1 5/4/2022 at Unknown time   • topiramate (TOPAMAX) 25 MG tablet Take 1 tablet by mouth 2 (Two) Times a Day. 180 tablet 0      Current medications:  acetaminophen, 1,000 mg, Oral, TID  aspirin, 81 mg, Oral, Daily  atorvastatin, 40 mg, Oral, Daily  carvedilol, 6.25 mg, Oral, BID With Meals  chlorthalidone, 25 mg, Oral, Daily  enoxaparin, 40 mg, Subcutaneous, Daily  levothyroxine, 75 mcg, Oral, QAM  losartan, 100 mg, Oral, Daily  senna-docusate sodium, 2 tablet, Oral, BID  sodium chloride, 10 mL,  "Intravenous, Q12H  topiramate, 25 mg, Oral, BID  venlafaxine XR, 75 mg, Oral, Daily With Breakfast      Current IV drips:  sodium chloride, 75 mL/hr, Last Rate: 75 mL/hr (05/05/22 0037)        Review of Systems   Constitutional: Negative.   HENT: Negative.    Eyes: Negative.    Cardiovascular: Positive for chest pain.   Respiratory: Positive for shortness of breath and sleep disturbances due to breathing.    Endocrine: Negative.    Hematologic/Lymphatic: Negative.    Skin: Negative.    Musculoskeletal: Negative.    Gastrointestinal: Negative.    Genitourinary: Negative.    Neurological: Negative.    Psychiatric/Behavioral: Negative.          Physical exam:    /82 (BP Location: Left arm, Patient Position: Lying)   Pulse 71   Temp 98.2 °F (36.8 °C) (Oral)   Resp 18   Ht 175.3 cm (69\")   Wt 123 kg (271 lb 2.7 oz)   SpO2 94%   BMI 40.04 kg/m²  Body mass index is 40.04 kg/m².   Oxygen saturation   @FLOWAN(10::1)@ SpO2  Min: 94 %  Max: 100 %    General Appearance:   · well developed  · well nourished, obese  HENT:   · oropharynx moist  · lips not cyanotic  Neck:  · thyroid not enlarged  · supple  Respiratory:  · no respiratory distress  · normal breath sounds  · no rales  Cardiovascular:  · no jugular venous distention  · regular rhythm  · apical impulse normal  · S1 normal, S2 normal  · no S3, no S4   · no murmur  · no rub, no thrill  · carotid pulses normal; no bruit  · pedal pulses normal  · lower extremity edema: none    Gastrointestinal:   · bowel sounds normal  · non-tender  · no hepatomegaly, no splenomegaly  Musculoskeletal:  · no clubbing of fingers.   · normocephalic, head atraumatic  Skin:   · warm, dry  Neuro/Psychiatric:  · judgement and insight appropriate  · normal mood and affect    Cardiographics:     ECG  (personally reviewed) sinus rhythm with subtle inferolateral ST depression   Telemetry:  (personally reviewed)    ECHO: Pending   CATH:     CARDIOLITE:      Lab Review:       Results from " last 7 days   Lab Units 05/05/22  0512 05/04/22 1928 05/04/22  1725   WBC 10*3/mm3 7.43 9.02 8.72   HEMOGLOBIN g/dL 13.9 14.7 15.5   HEMATOCRIT % 38.9 41.4 44.2            Results from last 7 days   Lab Units 05/05/22  0512 05/04/22 1928 05/04/22  1725   SODIUM mmol/L 139 137 138   BUN mg/dL 18 20 18   CREATININE mg/dL 1.05 1.15 1.29*   GLUCOSE mg/dL 100* 107* 97      Estimated Creatinine Clearance: 107.8 mL/min (by C-G formula based on SCr of 1.05 mg/dL).     Results from last 7 days   Lab Units 05/04/22 1928 05/04/22  1725   CHOLESTEROL mg/dL 169 176   HDL CHOL mg/dL 33* 31*             Lab Results   Component Value Date    TSH 3.980 11/04/2021      No results found for: HGBA1C        No results found for: DIGOXIN   No components found for: DDIMERQUAN     Imaging:   XR Chest 1 View    Result Date: 5/4/2022  PROCEDURE: XR CHEST 1 VW  COMPARISON: [facility], CR, XR CHEST 2 VW, 3/11/2022, 13:45.  INDICATIONS: Chest Pains, today  FINDINGS:  Lordotic positioning.  Overlying artifacts.  Low lung volumes and elevation of the right hemidiaphragm.  Mild bilateral perihilar and bibasilar opacities, likely atelectasis.  No focal lung consolidation.  No pneumothorax.  Unchanged cardiomediastinal contours.  No acute osseous abnormality is identified.  Cholecystectomy clips.      Impression:  Low lung volumes and elevation of the right hemidiaphragm with mild bilateral perihilar and bibasilar opacities that likely represent atelectasis.       NICKI CAPPS MD       Electronically Signed and Approved By: NICKI CAPPS MD on 5/04/2022 at 20:46                 Assessment:      Hyperlipemia    Hypertension    Unstable angina (HCC)      Initial cardiac assessment:   51-year-old white male presenting with typical unstable angina symptoms.  He has risk factors including hypertension dyslipidemia and rather severe family history of early coronary events.  EKG shows subtle ST depression.  Biomarkers  negative.      Recommendations:  1.  When considering the probability of underlying coronary artery disease based on the history and risk factors, I recommend coronary angiography in this case.  I have discussed the risk and benefits with the patient including radial and femoral access, possible need for PCI or even surgical referral.  The patient is agreeable to proceed.  I discussed the case with Dr. Kaur who has agreed to do the diagnostic and/or interventional procedure if needed.  2.  Hypertension stable, continue current home medical therapy.  3.  Hyperlipidemia stable continue statin therapy.  4.  Further recommendations will depend on angiographic findings.                  Yovani Cordero MD  5/5/2022    08:51 EDT

## 2022-05-05 NOTE — ED PROVIDER NOTES
Subjective   Yousuf Em is a 51 y.o. male that presents to the ED via private vehicle accompanied by significant other for intermittent episodes of CP since Friday (4/29/22) at 1800. Nothing improves or worsens the pain when the episodes occur and the episodes are spontaneous. The pain is located across the front of the chest and radiates into the jaw and has also radiated into the arm. The pain is described as pressure and tightness. Pt reports no other symptoms at this time. He denies hemoptysis. He was seen in office by PCP today and had concerning EKG so was referred to the ED for evaluation. He denies CP currently, but notes that he has some discomfort at this time that he rates a 2 out of 10. His last episode of CP was yesterday around 0930.     No hx of MI or coronary stent. Pt is a nonsmoker. He denies hx of DM. He has hx of HTN and HLD. Pt has family hx of CAD, noting that his dad, mother, and sisters have CAD with one sister passing in her 30s due to MI.     Pt had pneumonia a month ago.       History provided by:  Patient      Review of Systems   Constitutional: Negative for chills, diaphoresis and fever.   HENT: Negative for ear discharge and nosebleeds.    Eyes: Negative for photophobia.   Respiratory: Negative for shortness of breath.    Cardiovascular: Positive for chest pain.   Gastrointestinal: Negative for diarrhea, nausea and vomiting.   Genitourinary: Negative for dysuria.   Musculoskeletal: Negative for back pain and neck pain.   Skin: Negative for rash.   Neurological: Negative for headaches.   All other systems reviewed and are negative.      Past Medical History:   Diagnosis Date   • AR (allergic rhinitis)    • Arthritis    • Colitis    • Depression    • ETD (eustachian tube dysfunction)    • Forgetfulness    • Hearing loss    • High cholesterol    • Hyperlipemia    • Hypertension    • Labral tear of long head of biceps tendon, initial encounter 09/30/2015   • Labral tear of shoulder, left,  sequela 09/25/2015   • Right lateral epicondylitis 10/21/2016   • Seasonal allergies    • Thyroid disorder        No Known Allergies    Past Surgical History:   Procedure Laterality Date   • CHOLECYSTECTOMY  2000   • COLONOSCOPY     • EAR TUBES     • GALLBLADDER SURGERY     • OTHER SURGICAL HISTORY      JOINT SURGERY       Family History   Problem Relation Age of Onset   • Arthritis Mother    • Cancer Mother    • Heart disease Mother    • Diabetes Mother    • Heart disease Father    • Heart disease Sister    • Stroke Maternal Grandmother        Social History     Socioeconomic History   • Marital status: Single   Tobacco Use   • Smoking status: Never Smoker   • Smokeless tobacco: Never Used   Vaping Use   • Vaping Use: Never used   Substance and Sexual Activity   • Alcohol use: Not Currently     Comment: RARELY DRINKS, LESS THAN 1 DRINK PER DAY, HAS BEEN DRINKING FOR 11-20 YEARS    • Drug use: Never     Comment: CURRENT SOME DAY    • Sexual activity: Never         Objective   Physical Exam  Vitals and nursing note reviewed.   Constitutional:       General: He is not in acute distress.     Appearance: Normal appearance.   HENT:      Head: Normocephalic and atraumatic.      Nose: Nose normal.      Mouth/Throat:      Mouth: Mucous membranes are moist.   Eyes:      Extraocular Movements: Extraocular movements intact.      Pupils: Pupils are equal, round, and reactive to light.   Neck:      Comments: No meningismus or lymphadenopathy.   Cardiovascular:      Rate and Rhythm: Normal rate and regular rhythm.      Pulses:           Radial pulses are 2+ on the right side and 2+ on the left side.      Heart sounds: Normal heart sounds. No murmur heard.    No friction rub.   Pulmonary:      Effort: Pulmonary effort is normal. No respiratory distress.      Breath sounds: Normal breath sounds. No wheezing.   Abdominal:      General: There is no distension.      Palpations: Abdomen is soft.      Tenderness: There is no abdominal  tenderness. There is no guarding or rebound.   Musculoskeletal:         General: Normal range of motion.      Cervical back: Normal range of motion and neck supple.      Right lower leg: No edema.      Left lower leg: No edema.   Skin:     General: Skin is warm and dry.      Findings: No rash.   Neurological:      General: No focal deficit present.      Mental Status: He is alert and oriented to person, place, and time.      Sensory: No sensory deficit.      Motor: No weakness.   Psychiatric:         Mood and Affect: Mood normal.      Comments: Mood appears stable, no psychosis.           Procedures         ED Course  ED Course as of 05/04/22 2308   Wed May 04, 2022   2019 EKG: NSR at 75 bpm. Normal P waves. Q waves in inferior leads. Mild ST depression in V3, V4, V5.  [KJ]      ED Course User Index  [KJ] Malka Jenkins     I interpreted EKG as possible myocardial ischemia present.                HEART Score: 6                        MDM    For my differential diagnosis of this patient's chest pain I considered acute coronary syndrome, pulmonary embolism, musculoskeletal chest wall pain, acid reflux with esophagitis, thoracic muscle strain, or anxiety induced chest pain.        This patient is a 51-year-old male with history of hypertension hyperlipidemia and significant family history of coronary artery disease now presenting with chest pain starting last night at work radiating to the left arm and shoulder into the jaw that is now better but still slightly present.    He was seen by his primary care physician, Dr. Smith, who has significant concern for ischemic chest pain and has already discussed case in consultation with Dr. Crodero of cardiology to expedite his work-up.    He is already taken 324 mg of aspirin and states pain is better but still having 2 out of 10 chest pressure.    I gave him 1 sublingual nitroglycerin which took away all chest pain and pressure.    EKG does show both some inferior Q waves as  well as mild ST depression in the precordial leads but no elevations.    Initial troponin negative at 0.04 and he is currently chest pain-free after 1 sublingual nitroglycerin..    I have discussed the case with Dr. Cordero of cardiology to see him to set up stress testing and will plan to admit him to a monitored bed for chest pain rule out, given his significant cardiac risk factors and heart score of 6..    Final diagnoses:   Chest pain, unspecified type       Documentation assistance provided by breonna Jenkins.  Information recorded by the scribe was done at my direction and has been verified and validated by me.     Malka Jenkins  05/04/22 2028       Jerry Cobian MD  05/04/22 2113       Jerry Cobian MD  05/04/22 1752

## 2022-05-05 NOTE — H&P
[facility]  [facility]   Patient Name: Yousuf Em  : 1970  MRN: [id number]  Primary Care Physician:  Angela Smith MD  Date of admission: 2022    Subjective Chest pain  Subjective     Chief Complaint: Chest pain    HPI: Patient is a 51-year-old male who presents to the emergency room with intermittent episode of chest pain that started on 2022.  The pain is located across the front of his chest and radiates up to his jaw.  He describes pressure and tightness.  The pain does not feel like indigestion.    He saw Dr. Smith today who discussed the case with Dr. Cordero and the plan was to set him up for stress testing.  His EKGs was concerning; therefore, he was referred to the ED for evaluation.  His EKG does show both some inferior Q waves as well as mild ST depression in the precordial leads but no elevations.  His initial troponin is 0.04 but he is currently chest pain-free.    He has a strong family cardiac history.  His mother, father and sisters have coronary artery disease with 1 sister passing away in her 30s due to an MI.    On arrival to the ED, his temperature is 98.8, pulse is 78, respiratory rate is 18, blood pressure is 153/97, he saturating 100% on room air.  Chest x-ray shows:Low lung volumes and elevation of the right hemidiaphragm with mild bilateral perihilar and bibasilar opacities that likely represent atelectasis.     This lab lab abnormalities are potassium of 3.3 and a CK of 245.      Personal History     Past Medical History:  Past Medical History:   Diagnosis Date   • AR (allergic rhinitis)    • Arthritis    • Colitis    • Depression    • ETD (eustachian tube dysfunction)    • Forgetfulness    • Hearing loss    • High cholesterol    • Hyperlipemia    • Hypertension    • Labral tear of long head of biceps tendon, initial encounter 2015   • Labral tear of shoulder, left, sequela 2015   • Right lateral epicondylitis  10/21/2016   • Seasonal allergies    • Thyroid disorder          Past Surgical History:  Past Surgical History:   Procedure Laterality Date   • CHOLECYSTECTOMY  2000   • COLONOSCOPY     • EAR TUBES     • GALLBLADDER SURGERY     • OTHER SURGICAL HISTORY      JOINT SURGERY       Family History:   Family History   Problem Relation Age of Onset   • Arthritis Mother    • Cancer Mother    • Heart disease Mother    • Diabetes Mother    • Heart disease Father    • Heart disease Sister    • Stroke Maternal Grandmother        Social History:   Social History     Tobacco Use   • Smoking status: Never Smoker   • Smokeless tobacco: Never Used   Vaping Use   • Vaping Use: Never used   Substance Use Topics   • Alcohol use: Not Currently     Comment: RARELY DRINKS, LESS THAN 1 DRINK PER DAY, HAS BEEN DRINKING FOR 11-20 YEARS    • Drug use: Never     Comment: CURRENT SOME DAY        Home Medications:  albuterol sulfate HFA, atorvastatin, betamethasone (augmented), carvedilol, chlorthalidone, ciprofloxacin-dexamethasone, desvenlafaxine, fexofenadine, levothyroxine, losartan, and topiramate    Allergies:  No Known Allergies    Review of Systems   All systems were reviewed and negative except for: Chest pain radiating to the jaw    Objective   Objective     Vitals:   Temp:  [97.5 °F (36.4 °C)-98.8 °F (37.1 °C)] 98.8 °F (37.1 °C)  Heart Rate:  [78-88] 79  Resp:  [18] 18  BP: (126-153)/(78-97) 126/78    Physical Exam    Constitutional: Awake, alert, no acute distress   Eyes: Pupils equal, sclerae anicteric, no conjunctival injection   HENT: NCAT, mucous membranes moist   Neck: Supple, no thyromegaly, no lymphadenopathy, trachea midline   Respiratory: Clear to auscultation bilaterally, nonlabored respirations    Cardiovascular: RRR, no murmurs, rubs, or gallops, palpable pedal pulses bilaterally   Gastrointestinal: Positive bowel sounds, soft, nontender, nondistended   Musculoskeletal: No bilateral ankle edema, no clubbing or cyanosis to  extremities   Psychiatric: Appropriate affect, cooperative   Neurologic: Oriented x 3, strength symmetric in all extremities, Cranial Nerves grossly intact to confrontation, speech clear   Skin: No rashes     Result Review    Result Review:  I have personally reviewed the results from the time of this admission to 5/4/2022 21:17 EDT and agree with these findings:  [x]  Laboratory  []  Microbiology  [x]  Radiology  []  EKG/Telemetry   []  Cardiology/Vascular   []  Pathology  [x]  Old records  []  Other:      Assessment/Plan   Assessment / Plan   #1 chest pain  -Check hemoglobin A1c  -Check lipid panel  -Trend troponin  -Echo ordered  -Given aspirin, Lipitor, nitro, Coreg  -Dr. Cordero with cardiology consulted    #2 essential hypertension  -Continue Coreg, chlorthalidone, losartan    #3 depression  -Continue an NSRI    #4 hypothyroidism  -Continue Synthroid    #5 mild hypokalemia  -replete k.      DVT prophylaxis:  No DVT prophylaxis order currently exists.    CODE STATUS:    Level Of Support Discussed With: Patient  Code Status (Patient has no pulse and is not breathing): CPR (Attempt to Resuscitate)  Medical Interventions (Patient has pulse or is breathing): Full Support      Admission Status:  I believe this patient meets inpatient status.    Electronically signed by Annia Mueller DO, 05/04/22, 9:17 PM EDT.

## 2022-05-05 NOTE — PLAN OF CARE
Goal Outcome Evaluation:      New admit from ED. Denies any chest pain/discomfort this shift. VSS. NPO since 0000.

## 2022-05-05 NOTE — PLAN OF CARE
Goal Outcome Evaluation:  Plan of Care Reviewed With: patient            VSS. Pt has rested this shift. Will continue to monitor

## 2022-05-06 ENCOUNTER — READMISSION MANAGEMENT (OUTPATIENT)
Dept: CALL CENTER | Facility: HOSPITAL | Age: 52
End: 2022-05-06

## 2022-05-06 VITALS
RESPIRATION RATE: 18 BRPM | SYSTOLIC BLOOD PRESSURE: 137 MMHG | HEART RATE: 73 BPM | WEIGHT: 271.17 LBS | OXYGEN SATURATION: 98 % | DIASTOLIC BLOOD PRESSURE: 83 MMHG | HEIGHT: 69 IN | TEMPERATURE: 98.2 F | BODY MASS INDEX: 40.16 KG/M2

## 2022-05-06 LAB
ANION GAP SERPL CALCULATED.3IONS-SCNC: 10.4 MMOL/L (ref 5–15)
AORTIC DIMENSIONLESS INDEX: 0.6 (DI)
ASCENDING AORTA: 2.6 CM
BASOPHILS # BLD AUTO: 0.05 10*3/MM3 (ref 0–0.2)
BASOPHILS NFR BLD AUTO: 0.7 % (ref 0–1.5)
BH CV ECHO MEAS - AO MAX PG: 6 MMHG
BH CV ECHO MEAS - AO MEAN PG: 4 MMHG
BH CV ECHO MEAS - AO ROOT DIAM: 2.7 CM
BH CV ECHO MEAS - AO V2 MAX: 122 CM/SEC
BH CV ECHO MEAS - AO V2 VTI: 20.5 CM
BH CV ECHO MEAS - EDV(MOD-SP2): 106 ML
BH CV ECHO MEAS - EDV(MOD-SP4): 104 ML
BH CV ECHO MEAS - EF(MOD-BP): 55 %
BH CV ECHO MEAS - ESV(MOD-SP2): 41.9 ML
BH CV ECHO MEAS - ESV(MOD-SP4): 46.7 ML
BH CV ECHO MEAS - IVSD: 0.9 CM
BH CV ECHO MEAS - LA DIMENSION(2D): 3.1 CM
BH CV ECHO MEAS - LAT PEAK E' VEL: 7.6 CM/SEC
BH CV ECHO MEAS - LV MAX PG: 2 MMHG
BH CV ECHO MEAS - LV MEAN PG: 1 MMHG
BH CV ECHO MEAS - LV V1 MAX: 73.1 CM/SEC
BH CV ECHO MEAS - LV V1 VTI: 11.7 CM
BH CV ECHO MEAS - LVIDD: 5.3 CM
BH CV ECHO MEAS - LVIDS: 3.8 CM
BH CV ECHO MEAS - LVOT DIAM: 2 CM
BH CV ECHO MEAS - LVPWD: 0.9 CM
BH CV ECHO MEAS - MED PEAK E' VEL: 6.9 CM/SEC
BH CV ECHO MEAS - MV A MAX VEL: 64.3 CM/SEC
BH CV ECHO MEAS - MV DEC SLOPE: 309 CM/SEC2
BH CV ECHO MEAS - MV DEC TIME: 225 MSEC
BH CV ECHO MEAS - MV E MAX VEL: 69.4 CM/SEC
BH CV ECHO MEAS - MV E/A: 1.1
BH CV ECHO MEAS - MV MAX PG: 4 MMHG
BH CV ECHO MEAS - MV MEAN PG: 2 MMHG
BH CV ECHO MEAS - MV P1/2T: 41 MSEC
BH CV ECHO MEAS - MV V2 VTI: 18 CM
BH CV ECHO MEAS - MVA(P1/2T): 5.4 CM2
BH CV ECHO MEAS - PA V2 MAX: 65 CM/SEC
BH CV ECHO MEAS - RAP SYSTOLE: 3 MMHG
BH CV ECHO MEAS - RVDD: 3.4 CM
BH CV ECHO MEAS - RVSP: 20 MMHG
BH CV ECHO MEAS - TR MAX PG: 17 MMHG
BH CV ECHO MEAS - TR MAX VEL: 204 CM/SEC
BH CV ECHO MEASUREMENTS AVERAGE E/E' RATIO: 9.57
BUN SERPL-MCNC: 15 MG/DL (ref 6–20)
BUN/CREAT SERPL: 13.6 (ref 7–25)
CALCIUM SPEC-SCNC: 9.4 MG/DL (ref 8.6–10.5)
CHLORIDE SERPL-SCNC: 103 MMOL/L (ref 98–107)
CO2 SERPL-SCNC: 24.6 MMOL/L (ref 22–29)
CREAT SERPL-MCNC: 1.1 MG/DL (ref 0.76–1.27)
DEPRECATED RDW RBC AUTO: 41.8 FL (ref 37–54)
EGFRCR SERPLBLD CKD-EPI 2021: 81.3 ML/MIN/1.73
EOSINOPHIL # BLD AUTO: 0.29 10*3/MM3 (ref 0–0.4)
EOSINOPHIL NFR BLD AUTO: 4 % (ref 0.3–6.2)
ERYTHROCYTE [DISTWIDTH] IN BLOOD BY AUTOMATED COUNT: 12.9 % (ref 12.3–15.4)
GLUCOSE SERPL-MCNC: 120 MG/DL (ref 65–99)
HCT VFR BLD AUTO: 39.1 % (ref 37.5–51)
HGB BLD-MCNC: 14.1 G/DL (ref 13–17.7)
IMM GRANULOCYTES # BLD AUTO: 0.02 10*3/MM3 (ref 0–0.05)
IMM GRANULOCYTES NFR BLD AUTO: 0.3 % (ref 0–0.5)
IVRT: 70 MSEC
LEFT ATRIUM VOLUME: 22.2 CM3
LYMPHOCYTES # BLD AUTO: 2.3 10*3/MM3 (ref 0.7–3.1)
LYMPHOCYTES NFR BLD AUTO: 31.5 % (ref 19.6–45.3)
MAGNESIUM SERPL-MCNC: 2.3 MG/DL (ref 1.6–2.6)
MAXIMAL PREDICTED HEART RATE: 169 BPM
MCH RBC QN AUTO: 32 PG (ref 26.6–33)
MCHC RBC AUTO-ENTMCNC: 36.1 G/DL (ref 31.5–35.7)
MCV RBC AUTO: 88.9 FL (ref 79–97)
MONOCYTES # BLD AUTO: 0.58 10*3/MM3 (ref 0.1–0.9)
MONOCYTES NFR BLD AUTO: 7.9 % (ref 5–12)
NEUTROPHILS NFR BLD AUTO: 4.06 10*3/MM3 (ref 1.7–7)
NEUTROPHILS NFR BLD AUTO: 55.6 % (ref 42.7–76)
NRBC BLD AUTO-RTO: 0 /100 WBC (ref 0–0.2)
PHOSPHATE SERPL-MCNC: 3.7 MG/DL (ref 2.5–4.5)
PLATELET # BLD AUTO: 296 10*3/MM3 (ref 140–450)
PMV BLD AUTO: 9.2 FL (ref 6–12)
POTASSIUM SERPL-SCNC: 3.5 MMOL/L (ref 3.5–5.2)
QT INTERVAL: 424 MS
RBC # BLD AUTO: 4.4 10*6/MM3 (ref 4.14–5.8)
SODIUM SERPL-SCNC: 138 MMOL/L (ref 136–145)
STRESS TARGET HR: 144 BPM
WBC NRBC COR # BLD: 7.3 10*3/MM3 (ref 3.4–10.8)

## 2022-05-06 PROCEDURE — G0378 HOSPITAL OBSERVATION PER HR: HCPCS

## 2022-05-06 PROCEDURE — 83735 ASSAY OF MAGNESIUM: CPT | Performed by: INTERNAL MEDICINE

## 2022-05-06 PROCEDURE — 80048 BASIC METABOLIC PNL TOTAL CA: CPT | Performed by: INTERNAL MEDICINE

## 2022-05-06 PROCEDURE — 99217 PR OBSERVATION CARE DISCHARGE MANAGEMENT: CPT | Performed by: INTERNAL MEDICINE

## 2022-05-06 PROCEDURE — 84100 ASSAY OF PHOSPHORUS: CPT | Performed by: INTERNAL MEDICINE

## 2022-05-06 PROCEDURE — 93005 ELECTROCARDIOGRAM TRACING: CPT | Performed by: INTERNAL MEDICINE

## 2022-05-06 PROCEDURE — 36415 COLL VENOUS BLD VENIPUNCTURE: CPT | Performed by: INTERNAL MEDICINE

## 2022-05-06 PROCEDURE — 85025 COMPLETE CBC W/AUTO DIFF WBC: CPT | Performed by: INTERNAL MEDICINE

## 2022-05-06 PROCEDURE — 93010 ELECTROCARDIOGRAM REPORT: CPT | Performed by: INTERNAL MEDICINE

## 2022-05-06 PROCEDURE — 99214 OFFICE O/P EST MOD 30 MIN: CPT | Performed by: INTERNAL MEDICINE

## 2022-05-06 RX ORDER — ASPIRIN 81 MG/1
81 TABLET ORAL DAILY
Qty: 30 TABLET | Refills: 0 | Status: SHIPPED | OUTPATIENT
Start: 2022-05-06 | End: 2022-06-05

## 2022-05-06 RX ORDER — ATORVASTATIN CALCIUM 80 MG/1
80 TABLET, FILM COATED ORAL DAILY
Qty: 90 TABLET | Refills: 3 | Status: SHIPPED | OUTPATIENT
Start: 2022-05-06 | End: 2023-02-15 | Stop reason: SDUPTHER

## 2022-05-06 RX ORDER — ALBUTEROL SULFATE 90 UG/1
2 AEROSOL, METERED RESPIRATORY (INHALATION) EVERY 6 HOURS PRN
Qty: 18 G | Refills: 0 | Status: SHIPPED | OUTPATIENT
Start: 2022-05-06 | End: 2022-05-10

## 2022-05-06 RX ADMIN — CHLORTHALIDONE 25 MG: 25 TABLET ORAL at 08:50

## 2022-05-06 RX ADMIN — TOPIRAMATE 25 MG: 25 TABLET, FILM COATED ORAL at 08:50

## 2022-05-06 RX ADMIN — CARVEDILOL 6.25 MG: 6.25 TABLET, FILM COATED ORAL at 08:49

## 2022-05-06 RX ADMIN — ASPIRIN 81 MG: 81 TABLET, COATED ORAL at 08:49

## 2022-05-06 RX ADMIN — TICAGRELOR 90 MG: 90 TABLET ORAL at 08:51

## 2022-05-06 RX ADMIN — LOSARTAN POTASSIUM 100 MG: 50 TABLET, FILM COATED ORAL at 08:50

## 2022-05-06 RX ADMIN — ATORVASTATIN CALCIUM 40 MG: 40 TABLET, FILM COATED ORAL at 08:49

## 2022-05-06 RX ADMIN — LEVOTHYROXINE SODIUM 75 MCG: 75 TABLET ORAL at 06:41

## 2022-05-06 RX ADMIN — VENLAFAXINE HYDROCHLORIDE 75 MG: 75 CAPSULE, EXTENDED RELEASE ORAL at 08:50

## 2022-05-06 NOTE — DISCHARGE INSTR - APPOINTMENTS
Follow up with PCP (Dr. Smith) Tuesday May 10th at 9:00am  Follow up with Cardiologist Dr. Cordero May 17 @ 10:00am

## 2022-05-06 NOTE — DISCHARGE SUMMARY
[facility]         HOSPITALIST  DISCHARGE SUMMARY    Patient Name: Yousuf Em  : 1970  MRN: [id number]    Date of Admission: 2022  Date of Discharge:  22  Primary Care Physician: Angela Smith MD    Consultants:  -Cardiology: Dr. Jake Cordero, Dr. Samy Kaur    [facility] Problems:  NSTEMI s/p successful PCI to proximal left circumflex into OM1 branch with Xience preeti point drug-eluting stent  Unstable angina, resolved  Hypertension  Hyperlipidemia  Hypokalemia, resolved  Hypothyroidism  Morbid obesity with BMI: 40.04      Hospital Course     Hospital Course:  Yousuf Em is a 51 y.o. male with hypothyroidism, hyperlipidemia, hypertension who presented to the ED with intermittent chest pain that began on 2022, he was seen by PCP who discussed case with cardiology and patient was referred to ED for further evaluation. Evaluation in ED showed patient to have initial troponin of 0.04 and EKG showed some inferior Q waves as well as mild ST depression in the precordial leads.  Hospitalist service contacted for further evaluation management. Cardiology consulted to assist in care.  Patient taken to Cath Lab on 2022, significant findings included 95% discrete stenosis of the OM1 branch, successful PCI to proximal left circumflex into OM1 branch with Xience preeti point drug-eluting stent.  Patient tolerated procedure well without any acute postprocedural complications.  Patient had no recurrent chest pain or discomfort during hospitalization.  Patient will be started on dual antiplatelet therapy with aspirin and Brilinta as well as high intensity statin therapy per cardiology recommendations.  Patient hemodynamically stable and no additional inpatient evaluation or work-up necessary at this time, patient was discharged home with outpatient follow-up PCP and cardiology.    DISCHARGE Follow Up Recommendations for labs and diagnostics:   -Follow-up with PCP in 3 to 5  days  -Follow-up with cardiology in 2 weeks    Day of Discharge     Vital Signs:  Temp:  [97.5 °F (36.4 °C)-98.2 °F (36.8 °C)] 98.2 °F (36.8 °C)  Heart Rate:  [61-93] 73  Resp:  [16-19] 18  BP: (104-142)/(60-97) 137/83  Flow (L/min):  [2] 2  Physical Exam:   Gen: No acute distress, Conversant, Pleasant, sitting up in bed  HEENT: MMM, Atraumatic  Neck: Supple, Trachea midline  Resp: CTAB, No w/r/r, good aeration, equal chest rise bilaterally, no respiratory distress appreciated  Card: RRR, No m/r/g  Abd: Soft, Nontender, Nondistended, + bowel sounds  Ext: No cyanosis, No clubbing  Neuro: CN II-XII grossly intact, No focal deficits appreciated  Psych: AAO x 3, Normal mood, Normal affect    Discharge Details        Discharge Medications      New Medications      Instructions Start Date   aspirin 81 MG EC tablet   81 mg, Oral, Daily      ticagrelor 90 MG tablet tablet  Commonly known as: BRILINTA   90 mg, Oral, 2 Times Daily         Changes to Medications      Instructions Start Date   atorvastatin 20 MG tablet  Commonly known as: LIPITOR  What changed: when to take this   20 mg, Oral, Daily      chlorthalidone 25 MG tablet  Commonly known as: HYGROTON  What changed: when to take this   TAKE ONE TABLET BY MOUTH DAILY      losartan 50 MG tablet  Commonly known as: COZAAR  What changed:   · how much to take  · when to take this   TAKE TWO TABLETS BY MOUTH DAILY         Continue These Medications      Instructions Start Date   albuterol sulfate  (90 Base) MCG/ACT inhaler  Commonly known as: PROVENTIL HFA;VENTOLIN HFA;PROAIR HFA   2 puffs, Inhalation, Every 6 Hours PRN      carvedilol 6.25 MG tablet  Commonly known as: COREG   6.25 mg, Oral, 2 Times Daily With Meals      desvenlafaxine 100 MG 24 hr tablet  Commonly known as: PRISTIQ   100 mg, Oral, Daily      fexofenadine 180 MG tablet  Commonly known as: ALLEGRA   180 mg, Oral, Daily      levothyroxine 75 MCG tablet  Commonly known as: SYNTHROID, LEVOTHROID   75  mcg, Oral, Daily      topiramate 25 MG tablet  Commonly known as: TOPAMAX   25 mg, Oral, 2 Times Daily         Stop These Medications    ciprofloxacin-dexamethasone 0.3-0.1 % otic suspension  Commonly known as: Ciprodex            No Known Allergies    Discharge Disposition:  Home or Self Care    Diet:  Hospital:  Diet Order   Procedures   • Diet Regular; Cardiac       Discharge Activity:   Activity Instructions     Activity as Tolerated            CODE STATUS:  Code Status and Medical Interventions:   Ordered at: 05/04/22 2116     Level Of Support Discussed With:    Patient     Code Status (Patient has no pulse and is not breathing):    CPR (Attempt to Resuscitate)     Medical Interventions (Patient has pulse or is breathing):    Full Support       No future appointments.    Additional Instructions for the Follow-ups that You Need to Schedule     Discharge Follow-up with PCP   As directed       Currently Documented PCP:    Angela Smith MD    PCP Phone Number:    [phone]     Follow Up Details: Follow-up in 3 to 5 days         Discharge Follow-up with Specified Provider: Dr. Jake Cordero; 2 Weeks   As directed      To: Dr. Jake Cordero    Follow Up: 2 Weeks               Pertinent  and/or Most Recent Results     PROCEDURES:   -Left heart catheterization with successful PCI to proximal left circumflex artery into OM branch (05/05/2020)    RADIOLOGY:  XR Chest 1 View [[id number]] Nestor as Reviewed   Order Status: Completed Collected: 05/04/22 2046    Updated: 05/04/22 2050   Narrative:     PROCEDURE: XR CHEST 1 VW       COMPARISON: [facility], , XR CHEST 2 VW, 3/11/2022, 13:45.       INDICATIONS: Chest Pains, today       FINDINGS:   Lordotic positioning.  Overlying artifacts.  Low lung volumes and elevation of the right   hemidiaphragm.  Mild bilateral perihilar and bibasilar opacities, likely atelectasis.  No focal   lung consolidation.  No pneumothorax.  Unchanged cardiomediastinal contours.   No acute osseous   abnormality is identified.  Cholecystectomy clips.       Impression:     Low lung volumes and elevation of the right hemidiaphragm with mild bilateral perihilar   and bibasilar opacities that likely represent atelectasis.                        NICKI CAPPS MD         Electronically Signed and Approved By: NICKI CAPPS MD on 5/04/2022 at 20:46          LAB RESULTS:      Lab 05/06/22  0415 05/05/22  0512 05/04/22 1928 05/04/22  1725   WBC 7.30 7.43 9.02 8.72   HEMOGLOBIN 14.1 13.9 14.7 15.5   HEMATOCRIT 39.1 38.9 41.4 44.2   PLATELETS 296 290 330 332   NEUTROS ABS 4.06 3.94 5.07 4.69   IMMATURE GRANS (ABS) 0.02 0.02 0.03 0.02   LYMPHS ABS 2.30 2.46 3.07 3.01   MONOS ABS 0.58 0.72 0.56 0.68   EOS ABS 0.29 0.24 0.23 0.26   MCV 88.9 89.2 89.0 90.6         Lab 05/06/22  0415 05/05/22  0512 05/04/22 2253 05/04/22 1928 05/04/22  1725   SODIUM 138 139  --  137 138   POTASSIUM 3.5 3.6  --  3.3* 3.3*   CHLORIDE 103 103  --  100 102   CO2 24.6 26.4  --  24.6 23.0   ANION GAP 10.4 9.6  --  12.4 13.0   BUN 15 18  --  20 18   CREATININE 1.10 1.05  --  1.15 1.29*   EGFR 81.3 85.9  --  77.1 67.1   GLUCOSE 120* 100*  --  107* 97   CALCIUM 9.4 9.3  --  9.4 9.5   MAGNESIUM 2.3 2.4 2.3 2.2  --    PHOSPHORUS 3.7 3.5 3.7  --   --    HEMOGLOBIN A1C  --   --   --  6.30*  --          Lab 05/04/22 1928 05/04/22  1725   TOTAL PROTEIN 7.8 7.8   ALBUMIN 4.50 4.40   GLOBULIN 3.3 3.4   ALT (SGPT) 41 43*   AST (SGOT) 29 25   BILIRUBIN 0.5 0.4   ALK PHOS 85 85   LIPASE 23 24         Lab 05/05/22  0047 05/04/22  2253 05/04/22 1928 05/04/22  1725   PROBNP  --   --  31.9  --    TROPONIN T <0.010 <0.010  --  <0.010         Lab 05/04/22 1928 05/04/22  1725   CHOLESTEROL 169 176   LDL CHOL 96 94   HDL CHOL 33* 31*   TRIGLYCERIDES 232* 305*             Brief Urine Lab Results     None        Microbiology Results (last 10 days)     ** No results found for the last 240 hours. **                        Labs Pending at  Discharge:  Pending Labs     Order Current Status    POC Troponin I with Hold Tube In process          Time spent on Discharge including face to face service: Greater than 30 minutes    Electronically signed by Willis Metcalf MD, 05/06/22, 8:19 AM EDT.

## 2022-05-06 NOTE — OUTREACH NOTE
Prep Survey    Flowsheet Row Responses   [facility] facility patient discharged from? [facility]   Is LACE score < 7 ? Yes   Emergency Room discharge w/ pulse ox? No   Eligibility [facility]   Date of Admission 05/04/22   Date of Discharge 05/06/22   Discharge Disposition Home or Self Care   Discharge diagnosis CARDIAC CATHETERIZATION  NSTEMI s/p successful PCI    Does the patient have one of the following disease processes/diagnoses(primary or secondary)? Acute MI (STEMI,NSTEMI)   Does the patient have Home health ordered? No   Is there a DME ordered? No   Prep survey completed? Yes          ERIKA DICKERSON - Registered Nurse

## 2022-05-06 NOTE — PROGRESS NOTES
CARDIOLOGY  INPATIENT PROGRESS NOTE         [facility] CARE UNIT    5/6/2022      PATIENT IDENTIFICATION:   Name:  Yousuf Em      MRN:  [id number]     51 y.o.  male             Reason for visit: Unstable angina      SUBJECTIVE:    The patient had cardiac catheterization which showed a 95% circumflex lesion, this was stented.  Overnight the patient has been stable.  No further symptoms.  Vital signs stable.  OBJECTIVE:  Vitals:    05/05/22 2041 05/06/22 0008 05/06/22 0251 05/06/22 0805   BP:  130/78 105/61 137/83   BP Location:  Left arm Left arm Left arm   Patient Position:  Lying Lying Lying   Pulse: 87 67 61 73   Resp:  18 17 18   Temp:  97.8 °F (36.6 °C) 97.5 °F (36.4 °C) 98.2 °F (36.8 °C)   TempSrc:  Axillary Axillary Oral   SpO2:  95% 96% 98%   Weight:       Height:               Body mass index is 40.04 kg/m².    Intake/Output Summary (Last 24 hours) at 5/6/2022 0850  Last data filed at 5/5/2022 1801  Gross per 24 hour   Intake 480 ml   Output --   Net 480 ml       Telemetry: Sinus rhythm    Review of Systems   Respiratory: Negative for shortness of breath.    Cardiovascular: Negative for chest pain and palpitations.         Exam:  General appearance no acute distress    well nourished   HEENT sclerae non-icteric    lips not cyanotic   Respiratory rate and depth normal    normal breath sounds    no rales, no wheeze   Cardiovascular JVP normal    regular rhythm    S1 normal, S2 normal    no S3, no S4    no murmur    lower extremity edema:none  Right radial site intact, normal perfusion   Abdominal bowel sounds normal    abdomen soft, non-tender    no hepatosplenomegaly   Neuro-psych oriented to person, place, time    cooperative     No Known Allergies  Scheduled meds:  acetaminophen, 1,000 mg, Oral, TID  aspirin, 81 mg, Oral, Daily  atorvastatin, 40 mg, Oral, Daily  carvedilol, 6.25 mg, Oral, BID With Meals  chlorthalidone, 25 mg, Oral, Daily  levothyroxine, 75 mcg, Oral,  QAM  losartan, 100 mg, Oral, Daily  senna-docusate sodium, 2 tablet, Oral, BID  sodium chloride, 10 mL, Intravenous, Q12H  ticagrelor, 90 mg, Oral, BID  topiramate, 25 mg, Oral, BID  venlafaxine XR, 75 mg, Oral, Daily With Breakfast      IV meds:                      sodium chloride, 75 mL/hr, Last Rate: 75 mL/hr (05/05/22 0037)      Data Review:  Results from last 7 days   Lab Units 05/06/22 0415 05/05/22 0512 05/04/22 1928 05/04/22  1725   SODIUM mmol/L 138 139 137 138   BUN mg/dL 15 18 20 18   CREATININE mg/dL 1.10 1.05 1.15 1.29*   GLUCOSE mg/dL 120* 100* 107* 97             Estimated Creatinine Clearance: 102.9 mL/min (by C-G formula based on SCr of 1.1 mg/dL).  Results from last 7 days   Lab Units 05/06/22 0415 05/05/22 0512 05/04/22 1928 05/04/22  1725   WBC 10*3/mm3 7.30 7.43 9.02 8.72   HEMOGLOBIN g/dL 14.1 13.9 14.7 15.5         Results from last 7 days   Lab Units 05/04/22 1928 05/04/22  1725   ALT (SGPT) U/L 41 43*   AST (SGOT) U/L 29 25     No results found for: DIGOXIN   Lab Results   Component Value Date    TSH 3.980 11/04/2021     Results from last 7 days   Lab Units 05/04/22 1928 05/04/22  1725   CHOLESTEROL mg/dL 169 176   HDL CHOL mg/dL 33* 31*               Imaging (last 24 hr):   Imaging Results (Last 24 Hours)     ** No results found for the last 24 hours. **        Results for orders placed during the hospital encounter of 05/04/22    Adult Transthoracic Echo Complete W/ Cont if Necessary Per Protocol    Interpretation Summary  · Estimated right ventricular systolic pressure from tricuspid regurgitation is normal (<35 mmHg). Calculated right ventricular systolic pressure from tricuspid regurgitation is 20 mmHg.  · Left ventricular ejection fraction appears to be 56 - 60%.  · Left ventricular diastolic function was normal.  · The right ventricular cavity is borderline dilated.  · No evidence of significant valvular disease          ASSESSMENT:     Hyperlipemia    Hypertension     Unstable angina (HCC)    Chest pain, unspecified type            PLAN:  1.  The patient is clinically stable status post PCI.  Continue dual antiplatelet therapy, statin therapy.  2.  Hyperlipidemia, stable, continue statin therapy, increase Lipitor to 80 mg daily  3.  Hypertension-stable continue current medical therapy  4.  Okay for discharge, the importance of dual antiplatelet therapy was discussed with the patient.  We will arrange hospital follow-up in 3 to 4 weeks                Yovani Cordero MD  5/6/2022    08:50 EDT

## 2022-05-07 LAB — QT INTERVAL: 399 MS

## 2022-05-09 ENCOUNTER — TRANSITIONAL CARE MANAGEMENT TELEPHONE ENCOUNTER (OUTPATIENT)
Dept: CALL CENTER | Facility: HOSPITAL | Age: 52
End: 2022-05-09

## 2022-05-09 NOTE — OUTREACH NOTE
Call Center TCM Note    Flowsheet Row Responses   [facility] facility patient discharged from? [facility]   Does the patient have one of the following disease processes/diagnoses(primary or secondary)? Acute MI (STEMI,NSTEMI)   TCM attempt successful? No  [verbal release on file for Sara S.O. ]   Unsuccessful attempts Attempt 1   Comments regarding PCP Hospital PCP FOLLOW UP APPOINTMENT IS 5/10/22@0900am          Becky Perkins RN    5/9/2022, 09:41 EDT

## 2022-05-09 NOTE — OUTREACH NOTE
Call Center TCM Note    Flowsheet Row Responses   [facility] patient discharged from? [facility]   Does the patient have one of the following disease processes/diagnoses(primary or secondary)? Acute MI (STEMI,NSTEMI)   TCM attempt successful? Yes   Call start time 1119   Call end time 1123   Discharge diagnosis CARDIAC CATHETERIZATION  NSTEMI s/p successful PCI    Medication alerts for this patient BRIILINTA--bleeding precautions advised   Meds reviewed with patient/caregiver? Yes   Is the patient having any side effects they believe may be caused by any medication additions or changes? No   Does the patient have all prescriptions related to this admission filled (includes statins,anticoagulants,HTN meds,anti-arrhythmia meds) Yes   Is the patient taking all medications as directed (includes completed medication regime)? Yes   Comments regarding appointments Cardiology on 5/24/22   Does the patient have a primary care provider?  Yes   Comments regarding PCP Hospital PCP FOLLOW UP APPOINTMENT IS 5/10/22@0900am   Has home health visited the patient within 72 hours of discharge? N/A   Psychosocial issues? No   Did the patient receive a copy of their discharge instructions? Yes   Nursing interventions Reviewed instructions with patient   What is the patient's perception of their health status since discharge? Improving  [Patient denies any issues or concerns today--denies chest pain, SOA or palpitations at time of call.  ]   Nursing interventions Nurse provided patient education   Is the patient/caregiver able to teach back signs and symptoms of when to call for help immediately: Sudden chest discomfort, Nausea or vomiting, Irregular or rapid heart rate, Shortness of breath at any time, Sudden discomfort in arms, back, neck or jaw, Sudden sweating or clammy skin, Dizziness or lightheadedness   Nursing interventions Nurse provided patient education   Is the pateint /caregiver able to teach back the importance of cardiac  rehab? --  [Patient communicates cardiac rehab not discussed while hospitalized]   Is the patient/caregiver able to teach back lifestyle changes to help prevent MIs Quit smoking   Is the patient/caregiver able to teach back ways to prevent a second heart attack: Take medications, Follow up with MD, Participate in Cardiac Rehab, Manage risk factors   If the patient is a current smoker, are they able to teach back resources for cessation? Not a smoker   Is the patient/caregiver able to teach back the hierarchy of who to call/visit for symptoms/problems? PCP, Specialist, Home health nurse, Urgent Care, ED, 911 Yes   Additional teach back comments Right radial cath site w/minimal bruising/swelling--aware of post care precautions   TCM call completed? Yes          Becky Perkins RN    5/9/2022, 11:26 EDT

## 2022-05-10 ENCOUNTER — OFFICE VISIT (OUTPATIENT)
Dept: INTERNAL MEDICINE | Facility: CLINIC | Age: 52
End: 2022-05-10

## 2022-05-10 VITALS
BODY MASS INDEX: 39.55 KG/M2 | WEIGHT: 267 LBS | SYSTOLIC BLOOD PRESSURE: 126 MMHG | TEMPERATURE: 97.1 F | HEIGHT: 69 IN | OXYGEN SATURATION: 99 % | HEART RATE: 70 BPM | DIASTOLIC BLOOD PRESSURE: 72 MMHG

## 2022-05-10 DIAGNOSIS — I10 PRIMARY HYPERTENSION: Primary | ICD-10-CM

## 2022-05-10 DIAGNOSIS — E78.2 MIXED HYPERLIPIDEMIA: ICD-10-CM

## 2022-05-10 DIAGNOSIS — E66.9 OBESITY (BMI 30-39.9): ICD-10-CM

## 2022-05-10 PROCEDURE — 99495 TRANSJ CARE MGMT MOD F2F 14D: CPT | Performed by: INTERNAL MEDICINE

## 2022-05-10 RX ORDER — LEVOTHYROXINE SODIUM 0.07 MG/1
75 TABLET ORAL DAILY
Qty: 90 TABLET | Refills: 0 | Status: SHIPPED | OUTPATIENT
Start: 2022-05-10 | End: 2022-08-11 | Stop reason: SDUPTHER

## 2022-05-10 RX ORDER — LOSARTAN POTASSIUM 50 MG/1
50 TABLET ORAL DAILY
Qty: 90 TABLET | Refills: 1 | Status: SHIPPED | OUTPATIENT
Start: 2022-05-10 | End: 2022-11-18 | Stop reason: SDUPTHER

## 2022-05-10 RX ORDER — CHLORTHALIDONE 25 MG/1
25 TABLET ORAL DAILY
Qty: 90 TABLET | Refills: 0 | Status: SHIPPED | OUTPATIENT
Start: 2022-05-10 | End: 2022-10-26 | Stop reason: SDUPTHER

## 2022-05-10 RX ORDER — CARVEDILOL 6.25 MG/1
6.25 TABLET ORAL 2 TIMES DAILY WITH MEALS
Qty: 180 TABLET | Refills: 0 | Status: SHIPPED | OUTPATIENT
Start: 2022-05-10 | End: 2022-08-11 | Stop reason: SDUPTHER

## 2022-05-10 NOTE — PROGRESS NOTES
Transitional Care Follow Up Visit  Subjective    {Problem List  Visit Diagnosis   Encounters  Notes  Medications  Labs  Result Review Imaging  Media :23}     Yousuf Em is a 51 y.o. male who presents for a transitional care management visit.    Within 48 business hours after discharge our office contacted him via telephone to coordinate his care and needs.      I reviewed and discussed the details of that call along with the discharge summary, hospital problems, inpatient lab results, inpatient diagnostic studies, and consultation reports with Yousuf.     Current outpatient and discharge medications have been reconciled for the patient.  Reviewed by: Angela Smith MD      Date of TCM Phone Call 5/6/2022   [facility] [place] [facility]   Date of Admission 5/4/2022   Date of Discharge 5/6/2022   Discharge Disposition Home or Self Care     Risk for Readmission (LACE) Score: 2 (5/6/2022  6:01 AM)

## 2022-05-10 NOTE — PROGRESS NOTES
Transitional Care Follow Up Visit  Chet Em is a 51 y.o. male who presents for a transitional care management visit.    Within 48 business hours after discharge our office contacted him via telephone to coordinate his care and needs.      I reviewed and discussed the details of that call along with the discharge summary, hospital problems, inpatient lab results, inpatient diagnostic studies, and consultation reports with Yousuf.     Current outpatient and discharge medications have been reconciled for the patient.  Reviewed by: Angela Smith MD      Date of TCM Phone Call 5/6/2022   [facility]   Date of Admission 5/4/2022   Date of Discharge 5/6/2022   Discharge Disposition Home or Self Care     Risk for Readmission (LACE) Score: 2 (5/6/2022  6:01 AM)    Chief Complaint  Follow-up (Hospital, had a stent placed)    Chet Em presents to [facility]  History of Present Illness    The patient is here for a follow-up after being admitted to the hospital for a catheter and a stent placement.     Hospitalization, ED  The patient denies having a stress test while in the hospital. He did have a catheter and a stent placement. The patient reports having a 95 percent blockage prior to the procedure, and he felt immediately better after the surgery. He denies having continual chest tightness after the surgery. According to the discharge note, the patient was seen at the emergency department and did have a little bit of a bump in your troponin level. He did have a few EKG changes that were concerning, and the results revealed 95 percent stenosis of a certain branch of the circumflex artery. They then inserted a drug-eluting stent. They wanted to get the patient started on a dual antiplatelet therapy. He was prescribed aspirin and Brilinta to reduce the platelets from sticking. The cardiologists did want to prescribe a high  "dose cholesterol medication. He reports having heart damage that was measured by the cardiac enzyme. He reports completing an echocardiogram. He did appear to have a mild myocardial infarction and a slight right ventricular cavity dilation. The patient reports also taking carvedilol, lovastatin 50 MG 1 time daily, and chlorthalidone 1 time daily for his symptoms. He reports previously taking lovastatin 2 times daily. He was recommended to have Tylenol 1000 MG and possibly for pain due to an x-ray that revealed a slight atelectasis. He reports being able to breathe normally while laying on his left side after the surgery.    Hyperglycemia  The patient reports having high glucose levels while in the hospital. His glucose levels have been slightly elevated 105 mg/dL and 107 mg/dL.     Weight loss  He reports losing approximately 5 pounds and almost 8 pounds since 03/2022.    Low appetite  The patient reports a suppressed appetite while taking Topamax.    History of anxiety   He reports symptoms of work stress prior and after promotion.     History of pneumonia  The reports having a history of pneumonia in the past.     A review of systems was performed, and the pertinent positives are noted in the HPI.    Objective   Vital Signs:  /72 (BP Location: Left arm, Patient Position: Sitting, Cuff Size: Large Adult)   Pulse 70   Temp 97.1 °F (36.2 °C) (Temporal)   Ht 175.3 cm (69\")   Wt 121 kg (267 lb)   SpO2 99%   BMI 39.43 kg/m²           Physical Exam  Vitals reviewed.   Constitutional:       Appearance: Normal appearance. He is well-developed.   HENT:      Head: Normocephalic and atraumatic.      Right Ear: External ear normal.      Left Ear: External ear normal.   Eyes:      Conjunctiva/sclera: Conjunctivae normal.      Pupils: Pupils are equal, round, and reactive to light.   Cardiovascular:      Rate and Rhythm: Normal rate and regular rhythm.      Heart sounds: No murmur heard.    No friction rub. No " gallop.   Pulmonary:      Effort: Pulmonary effort is normal.      Breath sounds: Normal breath sounds. No wheezing or rhonchi.   Skin:     General: Skin is warm and dry.   Neurological:      Mental Status: He is alert and oriented to person, place, and time.   Psychiatric:         Mood and Affect: Affect normal.         Behavior: Behavior normal.         Thought Content: Thought content normal.        Result Review :                 Assessment and Plan    Diagnoses and all orders for this visit:    1. Primary hypertension (Primary)    2. Mixed hyperlipidemia    3. Obesity (BMI 30-39.9)  Comments:  cont to work on diet and exercise    Other orders  -     carvedilol (COREG) 6.25 MG tablet; Take 1 tablet by mouth 2 (Two) Times a Day With Meals.  Dispense: 180 tablet; Refill: 0  -     levothyroxine (SYNTHROID, LEVOTHROID) 75 MCG tablet; Take 1 tablet by mouth Daily for 90 days.  Dispense: 90 tablet; Refill: 0  -     chlorthalidone (HYGROTON) 25 MG tablet; Take 1 tablet by mouth Daily.  Dispense: 90 tablet; Refill: 0  -     losartan (COZAAR) 50 MG tablet; Take 1 tablet by mouth Daily.  Dispense: 90 tablet; Refill: 1    1. Coronary stenosis 95 percent, post surgery  - The patient will continue taking carvedilol, lovastatin 50 MG 1 time daily, and chlorthalidone 1 time daily for his post-operative symptoms. He will continue taking aspirin and Brilinta to reduce the platelets from sticking. We recommend that the patient gradually returns to normal activities and follow up with the cardiologist on 05/24/2022.    2. Hyperglycemia  - We will continue to monitor his glucose levels since they were elevated in the past and while at hospital.    3. Chest pain  - The patient will continue taking Topamax.           Follow Up   No follow-ups on file.  Patient was given instructions and counseling regarding his condition or for health maintenance advice. Please see specific information pulled into the AVS if appropriate.      Transcribed from ambient dictation for Angela Smith MD by Autumn GALAN.  05/10/22   12:28 EDT    Patient verbalized consent to the visit recording.  I have personally performed the services described in this document as transcribed by the above individual, and it is both accurate and complete.  Angela Simth MD  5/12/2022  14:03 EDT

## 2022-05-24 ENCOUNTER — OFFICE VISIT (OUTPATIENT)
Dept: CARDIOLOGY | Facility: CLINIC | Age: 52
End: 2022-05-24

## 2022-05-24 VITALS
HEIGHT: 69 IN | SYSTOLIC BLOOD PRESSURE: 160 MMHG | HEART RATE: 62 BPM | WEIGHT: 260 LBS | BODY MASS INDEX: 38.51 KG/M2 | DIASTOLIC BLOOD PRESSURE: 85 MMHG

## 2022-05-24 DIAGNOSIS — I10 ESSENTIAL HYPERTENSION: ICD-10-CM

## 2022-05-24 DIAGNOSIS — E78.5 HYPERLIPIDEMIA LDL GOAL <70: ICD-10-CM

## 2022-05-24 DIAGNOSIS — I25.10 CORONARY ARTERY DISEASE INVOLVING NATIVE CORONARY ARTERY OF NATIVE HEART WITHOUT ANGINA PECTORIS: Primary | ICD-10-CM

## 2022-05-24 PROCEDURE — 99214 OFFICE O/P EST MOD 30 MIN: CPT | Performed by: NURSE PRACTITIONER

## 2022-05-24 NOTE — PROGRESS NOTES
Chief Complaint  Hyperlipidemia and Hypertension    Subjective            Yousuf Em presents to [facility] CARDIOLOGY  History of Present Illness    Yousuf is a 51-year-old male here today for follow-up after recent non-STEMI at [facility].  He presented with chest pain on 4/29, initial troponin 0.04 and EKG showed some inferior Q waves as well as mild ST depression in precordial leads.  Cardiac catheterization showed 95% stenosis of the OM 1 branch, received successful PCI to proximal left circumflex into OM1 branch with KASSIE.  Procedure was uneventful and he was discharged home with dual antiplatelet therapy and high intensity statin.  Yousuf also has hypertension and hyperlipidemia.  Since his discharge, he is doing well.  No significant chest pain.  Occasional dyspnea with exertion.  He is compliant with all his medications including his dual antiplatelet therapy.  He has been watching his diet and increasing exercise, he has lost 22 pounds.  Home blood pressure normal.    PMH  Past Medical History:   Diagnosis Date   • AR (allergic rhinitis)    • Arthritis    • Colitis    • Depression    • ETD (eustachian tube dysfunction)    • Forgetfulness    • Hearing loss    • High cholesterol    • Hyperlipemia    • Hypertension    • Labral tear of long head of biceps tendon, initial encounter 09/30/2015   • Labral tear of shoulder, left, sequela 09/25/2015   • Right lateral epicondylitis 10/21/2016   • Seasonal allergies    • Thyroid disorder          SURGICALHX  Past Surgical History:   Procedure Laterality Date   • CARDIAC CATHETERIZATION N/A 5/5/2022    Procedure: Left Heart Cath;  Surgeon: Samy Kaur MD;  Location: [facility] INVASIVE LOCATION;  Service: Cardiovascular;  Laterality: N/A;   • CARDIAC CATHETERIZATION N/A 5/5/2022    Procedure: Percutaneous Coronary Intervention;  Surgeon: Samy Kaur MD;  Location: [facility] INVASIVE LOCATION;  Service: Cardiovascular;   Laterality: N/A;   • CHOLECYSTECTOMY  2000   • COLONOSCOPY     • EAR TUBES     • GALLBLADDER SURGERY     • OTHER SURGICAL HISTORY      JOINT SURGERY        SOC  Social History     Socioeconomic History   • Marital status: Single   Tobacco Use   • Smoking status: Never Smoker   • Smokeless tobacco: Never Used   Vaping Use   • Vaping Use: Never used   Substance and Sexual Activity   • Alcohol use: Never   • Drug use: Never   • Sexual activity: Never         FAMHX  Family History   Problem Relation Age of Onset   • Arthritis Mother    • Cancer Mother    • Heart disease Mother    • Diabetes Mother    • Heart disease Father    • Heart disease Sister    • Stroke Maternal Grandmother           ALLERGY  No Known Allergies     MEDSCURRENT    Current Outpatient Medications:   •  aspirin 81 MG EC tablet, Take 1 tablet by mouth Daily for 30 days., Disp: 30 tablet, Rfl: 0  •  atorvastatin (LIPITOR) 80 MG tablet, Take 1 tablet by mouth Daily., Disp: 90 tablet, Rfl: 3  •  carvedilol (COREG) 6.25 MG tablet, Take 1 tablet by mouth 2 (Two) Times a Day With Meals., Disp: 180 tablet, Rfl: 0  •  chlorthalidone (HYGROTON) 25 MG tablet, Take 1 tablet by mouth Daily., Disp: 90 tablet, Rfl: 0  •  desvenlafaxine (PRISTIQ) 100 MG 24 hr tablet, Take 1 tablet by mouth Daily., Disp: 90 tablet, Rfl: 1  •  fexofenadine (ALLEGRA) 180 MG tablet, Take 180 mg by mouth Daily., Disp: , Rfl:   •  levothyroxine (SYNTHROID, LEVOTHROID) 75 MCG tablet, Take 1 tablet by mouth Daily for 90 days., Disp: 90 tablet, Rfl: 0  •  losartan (COZAAR) 50 MG tablet, Take 1 tablet by mouth Daily., Disp: 90 tablet, Rfl: 1  •  ticagrelor (BRILINTA) 90 MG tablet tablet, Take 1 tablet by mouth 2 (Two) Times a Day for 30 days., Disp: 180 tablet, Rfl: 3  •  topiramate (TOPAMAX) 25 MG tablet, Take 1 tablet by mouth 2 (Two) Times a Day., Disp: 180 tablet, Rfl: 0      Review of Systems   Constitutional: Negative for malaise/fatigue.   Cardiovascular: Positive for dyspnea on  "exertion. Negative for chest pain, irregular heartbeat, leg swelling, near-syncope, orthopnea, palpitations and syncope.   Hematologic/Lymphatic: Negative for bleeding problem.   Neurological: Negative for dizziness and light-headedness.        Objective     /86 (BP Location: Left arm)   Pulse 92   Ht 175.3 cm (69\")   Wt 118 kg (260 lb)   BMI 38.40 kg/m²       General Appearance:   · well developed  · well nourished  HENT:   · oropharynx moist  · lips not cyanotic  Neck:  · thyroid not enlarged  · supple  Respiratory:  · no respiratory distress  · normal breath sounds  · no rales  Cardiovascular:  · no jugular venous distention  · regular rhythm  · apical impulse normal  · S1 normal, S2 normal  · no S3, no S4   · no murmur  · no rub, no thrill  · carotid pulses normal; no bruit  · pedal pulses normal  · lower extremity edema: none    Musculoskeletal:  · no clubbing of fingers.   · normocephalic, head atraumatic  Skin:   · warm, dry  Psychiatric:  · judgement and insight appropriate  · normal mood and affect      Result Review :         CBC    CBC 5/4/22 5/4/22 5/5/22 5/6/22 1725 1928     WBC 8.72 9.02 7.43 7.30   RBC 4.88 4.65 4.36 4.40   Hemoglobin 15.5 14.7 13.9 14.1   Hematocrit 44.2 41.4 38.9 39.1   MCV 90.6 89.0 89.2 88.9   MCH 31.8 31.6 31.9 32.0   MCHC 35.1 35.5 35.7 36.1 (A)   RDW 13.3 12.8 12.8 12.9   Platelets 332 330 290 296   (A) Abnormal value            Lipid Panel    Lipid Panel 11/4/21 5/4/22 5/4/22 1725 1928   Total Cholesterol 203 (A) 176 169   Triglycerides 220 (A) 305 (A) 232 (A)   HDL Cholesterol 32 (A) 31 (A) 33 (A)   VLDL Cholesterol 40 51 (A) 40   LDL Cholesterol  131 (A) 94 96   LDL/HDL Ratio 3.97 2.71 2.72   (A) Abnormal value            BMP    BMP 5/4/22 5/4/22 5/5/22 5/6/22 1725 1928     BUN 18 20 18 15   Creatinine 1.29 (A) 1.15 1.05 1.10   Sodium 138 137 139 138   Potassium 3.3 (A) 3.3 (A) 3.6 3.5   Chloride 102 100 103 103   CO2 23.0 24.6 26.4 24.6   Calcium 9.5 9.4 " 9.3 9.4   (A) Abnormal value       Comments are available for some flowsheets but are not being displayed.             Data reviewed: Cardiology studies ER notes and cardiac catheterization notes reviewed.  Laboratory studies reviewed LDL 96.     Procedures      Yousuf Em  reports that he has never smoked. He has never used smokeless tobacco.. I have educated him on the risk of diseases from using tobacco products such as cancer, COPD and heart disease.                Assessment and Plan        ASSESSMENT:  Encounter Diagnoses   Name Primary?   • Coronary artery disease involving native coronary artery of native heart without angina pectoris Yes   • Essential hypertension    • Hyperlipidemia LDL goal <70          PLAN:    1.  Coronary artery disease-clinically stable.  Doing well post PCI.  He is compliant with his dual antiplatelet therapy.  He is having some occasional exertional dyspnea, we discussed that this could be a side effect of his Brilinta.  For now he is willing to continue this agent as this symptom is mild, if this progresses any or does not subside he will call and we can switch him to Plavix.  Continue beta-blocker and statin therapy.  2.  Essential hypertension-elevated today in office.  Home readings are normal.  No adjustments today.  3.  Hyperlipidemia-LDL above goal, 96.  He just started statin therapy, will repeat a lipid panel in August for further management.    Yousuf is agreeable to the plan of care he will follow-up in 3 months unless problems arise.          Patient was given instructions and counseling regarding his condition or for health maintenance advice. Please see specific information pulled into the AVS if appropriate.           Di Lama, APRN   5/24/2022  10:11 EDT

## 2022-08-11 RX ORDER — CARVEDILOL 6.25 MG/1
6.25 TABLET ORAL 2 TIMES DAILY WITH MEALS
Qty: 180 TABLET | Refills: 0 | Status: SHIPPED | OUTPATIENT
Start: 2022-08-11 | End: 2022-08-24

## 2022-08-11 RX ORDER — LEVOTHYROXINE SODIUM 0.07 MG/1
75 TABLET ORAL DAILY
Qty: 90 TABLET | Refills: 0 | Status: SHIPPED | OUTPATIENT
Start: 2022-08-11 | End: 2022-11-07 | Stop reason: SDUPTHER

## 2022-08-24 ENCOUNTER — OFFICE VISIT (OUTPATIENT)
Dept: CARDIOLOGY | Facility: CLINIC | Age: 52
End: 2022-08-24

## 2022-08-24 VITALS
WEIGHT: 266.4 LBS | BODY MASS INDEX: 39.34 KG/M2 | HEART RATE: 70 BPM | DIASTOLIC BLOOD PRESSURE: 84 MMHG | SYSTOLIC BLOOD PRESSURE: 124 MMHG

## 2022-08-24 DIAGNOSIS — I10 ESSENTIAL HYPERTENSION: ICD-10-CM

## 2022-08-24 DIAGNOSIS — E78.5 HYPERLIPIDEMIA LDL GOAL <70: ICD-10-CM

## 2022-08-24 DIAGNOSIS — R53.83 FATIGUE, UNSPECIFIED TYPE: ICD-10-CM

## 2022-08-24 DIAGNOSIS — I25.10 CORONARY ARTERY DISEASE INVOLVING NATIVE CORONARY ARTERY OF NATIVE HEART WITHOUT ANGINA PECTORIS: Primary | ICD-10-CM

## 2022-08-24 PROCEDURE — 99214 OFFICE O/P EST MOD 30 MIN: CPT | Performed by: INTERNAL MEDICINE

## 2022-08-24 NOTE — PROGRESS NOTES
Chief Complaint  Hypertension and Hyperlipidemia    Subjective            Yousuf JAYLA Em presents to [facility] CARDIOLOGY  History of Present Illness    Mr. Em is here for follow-up evaluation management of coronary artery disease, ACS May 2022, PCI to the first marginal branch of the circumflex.  He has hypertension, hyperlipidemia and sleep apnea which is treated.  He has been having generalized fatigue over the past few months.  Not specifically exertional.  He is compliant with his medications and CPAP therapy.    PMH  Past Medical History:   Diagnosis Date   • AR (allergic rhinitis)    • Arthritis    • Colitis    • Depression    • ETD (eustachian tube dysfunction)    • Forgetfulness    • Hearing loss    • High cholesterol    • Hyperlipemia    • Hypertension    • Labral tear of long head of biceps tendon, initial encounter 09/30/2015   • Labral tear of shoulder, left, sequela 09/25/2015   • Right lateral epicondylitis 10/21/2016   • Seasonal allergies    • Thyroid disorder          SURGICALHX  Past Surgical History:   Procedure Laterality Date   • CARDIAC CATHETERIZATION N/A 5/5/2022    Procedure: Left Heart Cath;  Surgeon: Samy Kaur MD;  Location: [facility] INVASIVE LOCATION;  Service: Cardiovascular;  Laterality: N/A;   • CARDIAC CATHETERIZATION N/A 5/5/2022    Procedure: Percutaneous Coronary Intervention;  Surgeon: Samy Kaur MD;  Location: [facility] INVASIVE LOCATION;  Service: Cardiovascular;  Laterality: N/A;   • CHOLECYSTECTOMY  2000   • COLONOSCOPY     • EAR TUBES     • GALLBLADDER SURGERY     • OTHER SURGICAL HISTORY      JOINT SURGERY        SOC  Social History     Socioeconomic History   • Marital status: Single   Tobacco Use   • Smoking status: Never Smoker   • Smokeless tobacco: Never Used   Vaping Use   • Vaping Use: Never used   Substance and Sexual Activity   • Alcohol use: Never   • Drug use: Never   • Sexual activity: Never         FAMHX  Family History    Problem Relation Age of Onset   • Arthritis Mother    • Cancer Mother    • Heart disease Mother    • Diabetes Mother    • Heart disease Father    • Heart disease Sister    • Stroke Maternal Grandmother           ALLERGY  No Known Allergies     MEDSCURRENT    Current Outpatient Medications:   •  atorvastatin (LIPITOR) 80 MG tablet, Take 1 tablet by mouth Daily., Disp: 90 tablet, Rfl: 3  •  chlorthalidone (HYGROTON) 25 MG tablet, Take 1 tablet by mouth Daily., Disp: 90 tablet, Rfl: 0  •  desvenlafaxine (PRISTIQ) 100 MG 24 hr tablet, Take 1 tablet by mouth Daily., Disp: 90 tablet, Rfl: 1  •  fexofenadine (ALLEGRA) 180 MG tablet, Take 180 mg by mouth Daily., Disp: , Rfl:   •  levothyroxine (SYNTHROID, LEVOTHROID) 75 MCG tablet, Take 1 tablet by mouth Daily for 90 days., Disp: 90 tablet, Rfl: 0  •  losartan (COZAAR) 50 MG tablet, Take 1 tablet by mouth Daily., Disp: 90 tablet, Rfl: 1  •  ticagrelor (BRILINTA) 90 MG tablet tablet, Take 1 tablet by mouth 2 (Two) Times a Day for 30 days., Disp: 180 tablet, Rfl: 3  •  topiramate (TOPAMAX) 25 MG tablet, Take 1 tablet by mouth 2 (Two) Times a Day., Disp: 180 tablet, Rfl: 0      Review of Systems   Constitutional: Positive for malaise/fatigue.   Cardiovascular: Negative for chest pain.        Objective     /84   Pulse 70   Wt 121 kg (266 lb 6.4 oz)   BMI 39.34 kg/m²       General Appearance:   · well developed  · well nourished  HENT:   · oropharynx moist  · lips not cyanotic  Neck:  · thyroid not enlarged  · supple  Respiratory:  · no respiratory distress  · normal breath sounds  · no rales  Cardiovascular:  · no jugular venous distention  · regular rhythm  · apical impulse normal  · S1 normal, S2 normal  · no S3, no S4   · no murmur  · no rub, no thrill  · carotid pulses normal; no bruit  · pedal pulses normal  · lower extremity edema: none    Musculoskeletal:  · no clubbing of fingers.   · normocephalic, head atraumatic  Skin:   · warm,  dry  Psychiatric:  · judgement and insight appropriate  · normal mood and affect      Result Review :     The following data was reviewed by: Yovani Cordero MD on 08/24/2022:    CMP    CMP 5/4/22 5/4/22 5/5/22 5/6/22 1725 1928     Glucose 97 107 (A) 100 (A) 120 (A)   BUN 18 20 18 15   Creatinine 1.29 (A) 1.15 1.05 1.10   Sodium 138 137 139 138   Potassium 3.3 (A) 3.3 (A) 3.6 3.5   Chloride 102 100 103 103   Calcium 9.5 9.4 9.3 9.4   Albumin 4.40 4.50     Total Bilirubin 0.4 0.5     Alkaline Phosphatase 85 85     AST (SGOT) 25 29     ALT (SGPT) 43 (A) 41     (A) Abnormal value       Comments are available for some flowsheets but are not being displayed.           CBC    CBC 5/4/22 5/4/22 5/5/22 5/6/22 1725 1928     WBC 8.72 9.02 7.43 7.30   RBC 4.88 4.65 4.36 4.40   Hemoglobin 15.5 14.7 13.9 14.1   Hematocrit 44.2 41.4 38.9 39.1   MCV 90.6 89.0 89.2 88.9   MCH 31.8 31.6 31.9 32.0   MCHC 35.1 35.5 35.7 36.1 (A)   RDW 13.3 12.8 12.8 12.9   Platelets 332 330 290 296   (A) Abnormal value            Lipid Panel    Lipid Panel 11/4/21 5/4/22 5/4/22 1725 1928   Total Cholesterol 203 (A) 176 169   Triglycerides 220 (A) 305 (A) 232 (A)   HDL Cholesterol 32 (A) 31 (A) 33 (A)   VLDL Cholesterol 40 51 (A) 40   LDL Cholesterol  131 (A) 94 96   LDL/HDL Ratio 3.97 2.71 2.72   (A) Abnormal value            TSH    TSH 11/4/21   TSH 3.980                  Procedures           Assessment and Plan        ASSESSMENT:  Encounter Diagnoses   Name Primary?   • Coronary artery disease involving native coronary artery of native heart without angina pectoris Yes   • Essential hypertension    • Hyperlipidemia LDL goal <70    • Fatigue, unspecified type          PLAN:    1.  Coronary artery disease, ACS May 2022, PCI to the first marginal branch of the circumflex, clinically stable.  Continue dual antiplatelet and statin therapy.  2.  Hypertension, controlled, continue current medical regimen.  3.  Unspecified fatigue,  difficult to say what is causing it, it could be the beta-blocker.  He does not have a compelling reason for a beta-blocker at this point.  I am weaning him off of carvedilol.  He will call or send me a message in the next few weeks to let me know how it is going.  We may need to increase the dose of losartan if his blood pressure elevates.  4.  Hyperlipidemia, stable, continue statin therapy    Otherwise routine follow-up in 6 months          Patient was given instructions and counseling regarding his condition or for health maintenance advice. Please see specific information pulled into the AVS if appropriate.             RISA Cordero MD  8/24/2022    10:26 EDT

## 2022-09-08 RX ORDER — DESVENLAFAXINE 100 MG/1
100 TABLET, EXTENDED RELEASE ORAL DAILY
Qty: 90 TABLET | Refills: 1 | Status: SHIPPED | OUTPATIENT
Start: 2022-09-08 | End: 2023-03-20 | Stop reason: SDUPTHER

## 2022-09-21 RX ORDER — TOPIRAMATE 25 MG/1
25 TABLET ORAL 2 TIMES DAILY
Qty: 180 TABLET | Refills: 0 | Status: SHIPPED | OUTPATIENT
Start: 2022-09-21 | End: 2022-11-18

## 2022-11-01 RX ORDER — CHLORTHALIDONE 25 MG/1
25 TABLET ORAL DAILY
Qty: 90 TABLET | Refills: 0 | Status: SHIPPED | OUTPATIENT
Start: 2022-11-01 | End: 2022-11-18

## 2022-11-07 ENCOUNTER — OFFICE VISIT (OUTPATIENT)
Dept: OTOLARYNGOLOGY | Facility: CLINIC | Age: 52
End: 2022-11-07

## 2022-11-07 VITALS — HEIGHT: 69 IN | WEIGHT: 266.4 LBS | BODY MASS INDEX: 39.46 KG/M2 | TEMPERATURE: 97.2 F

## 2022-11-07 DIAGNOSIS — H69.83 DYSFUNCTION OF BOTH EUSTACHIAN TUBES: ICD-10-CM

## 2022-11-07 DIAGNOSIS — H92.22 EAR BLEEDING, LEFT: Primary | ICD-10-CM

## 2022-11-07 PROCEDURE — 92504 EAR MICROSCOPY EXAMINATION: CPT | Performed by: NURSE PRACTITIONER

## 2022-11-07 PROCEDURE — 99213 OFFICE O/P EST LOW 20 MIN: CPT | Performed by: NURSE PRACTITIONER

## 2022-11-07 RX ORDER — CIPROFLOXACIN AND DEXAMETHASONE 3; 1 MG/ML; MG/ML
3 SUSPENSION/ DROPS AURICULAR (OTIC) 2 TIMES DAILY
Qty: 7.5 ML | Refills: 0 | Status: SHIPPED | OUTPATIENT
Start: 2022-11-07 | End: 2022-11-12

## 2022-11-07 NOTE — PROGRESS NOTES
Patient Name: Yousuf Em   Visit Date: 11/07/2022   Patient ID: [id number]  Provider: AKI Glover    Sex: male  Location: [facility]   YOB: 1970  Location Address: [address] [place] [address], [address],?[place]?[address]    Primary Care Provider Angela Smith MD  Location Phone: [phone]    Referring Provider: No ref. provider found        Chief Complaint  Ear Problem and Bleeding from ear    Subjective          Yousuf Em is a 52 y.o. male who presents to [facility] for a follow-up visit of his ears.  He is a established patient of Dr. Lamb having last been seen on 9/17/2021.  He has a history of eustachian tube dysfunction and had bilateral PE tubes placed.  He states about 3 weeks ago he had a bad upper respiratory infection and began to have purulent drainage out of both ears and then eventually bleeding from the left ear.  He states he has continued to get dark blood out of the left ear since that time.  He was prescribed oral antibiotics and also eardrops.  He has no longer had any drainage from the ears but when he uses a Q-tip he does get blood out of the left side.      Current Outpatient Medications on File Prior to Visit   Medication Sig   • aspirin 81 MG chewable tablet Chew 1 tablet Daily.   • atorvastatin (LIPITOR) 80 MG tablet Take 1 tablet by mouth Daily.   • benzonatate (TESSALON) 100 MG capsule Take 1 capsule by mouth 3 (Three) Times a Day As Needed for Cough for up to 30 doses.   • cetirizine (zyrTEC) 10 MG tablet Take 1 tablet by mouth Daily.   • chlorthalidone (HYGROTON) 25 MG tablet Take 1 tablet by mouth Daily.   • desvenlafaxine (PRISTIQ) 100 MG 24 hr tablet Take 1 tablet by mouth Daily.   • fexofenadine (ALLEGRA) 180 MG tablet Take 180 mg by mouth Daily.   • levothyroxine (SYNTHROID, LEVOTHROID) 75 MCG tablet Take 1 tablet by mouth Daily for 90 days.   • losartan (COZAAR) 50 MG tablet Take 1 tablet by  "mouth Daily.   • topiramate (TOPAMAX) 25 MG tablet Take 1 tablet by mouth 2 (Two) Times a Day.   • Pseudoephedrine-APAP-DM (DAYQUIL PO) Take  by mouth.   • [] ticagrelor (BRILINTA) 90 MG tablet tablet Take 1 tablet by mouth 2 (Two) Times a Day for 30 days.     No current facility-administered medications on file prior to visit.        Social History     Tobacco Use   • Smoking status: Never   • Smokeless tobacco: Never   Vaping Use   • Vaping Use: Never used   Substance Use Topics   • Alcohol use: Never   • Drug use: Never        Objective     Vital Signs:   Temp 97.2 °F (36.2 °C) (Temporal)   Ht 175.3 cm (69\")   Wt 121 kg (266 lb 6.4 oz)   BMI 39.34 kg/m²       Physical Exam  Constitutional:       General: He is not in acute distress.     Appearance: Normal appearance. He is not ill-appearing.   HENT:      Head: Normocephalic and atraumatic.      Jaw: There is normal jaw occlusion. No tenderness or pain on movement.      Salivary Glands: Right salivary gland is not diffusely enlarged or tender. Left salivary gland is not diffusely enlarged or tender.      Right Ear: Tympanic membrane, ear canal and external ear normal. A PE tube is present.      Left Ear: Ear canal and external ear normal. A PE tube is present.      Nose: Nose normal. No septal deviation.      Right Sinus: No maxillary sinus tenderness or frontal sinus tenderness.      Left Sinus: No maxillary sinus tenderness or frontal sinus tenderness.      Mouth/Throat:      Lips: Pink. No lesions.      Mouth: Mucous membranes are moist. No oral lesions.      Dentition: Normal dentition.      Tongue: No lesions.      Palate: No mass and lesions.      Pharynx: Oropharynx is clear. Uvula midline.      Tonsils: No tonsillar exudate.   Eyes:      Extraocular Movements: Extraocular movements intact.      Conjunctiva/sclera: Conjunctivae normal.      Pupils: Pupils are equal, round, and reactive to light.   Neck:      Thyroid: No thyroid mass, thyromegaly " or thyroid tenderness.      Trachea: Trachea normal.   Pulmonary:      Effort: Pulmonary effort is normal. No respiratory distress.   Musculoskeletal:         General: Normal range of motion.      Cervical back: Normal range of motion and neck supple. No tenderness.   Lymphadenopathy:      Cervical: No cervical adenopathy.   Skin:     General: Skin is warm and dry.   Neurological:      General: No focal deficit present.      Mental Status: He is alert and oriented to person, place, and time.      Cranial Nerves: No cranial nerve deficit.      Motor: No weakness.      Gait: Gait normal.   Psychiatric:         Mood and Affect: Mood normal.         Behavior: Behavior normal.         Thought Content: Thought content normal.         Judgment: Judgment normal.          Ear Microscopy    Date/Time: 11/7/2022 1:30 PM  Performed by: Brigid Zurita APRN  Authorized by: Brigid Zurita APRN     Ear examination was performed utilizing binocular microscopy.  Right auricle:   normal:   Right ear canal:   normal:   Right tympanic membrane:   right PE tube present:   Left auricle:   normal:   Left ear canal:   normal:   Left tympanic membrane:     left PE tube present (Dried blood around the left PE tube):      Post-procedure details:     No medication was applied to the ear canal.    The procedure was tolerated well, no immediate complications.         Result Review :               Assessment and Plan    Diagnoses and all orders for this visit:    1. Ear bleeding, left (Primary)  -     ciprofloxacin-dexamethasone (CIPRODEX) 0.3-0.1 % otic suspension; Administer 3 drops into the left ear 2 (Two) Times a Day for 5 days.  Dispense: 7.5 mL; Refill: 0    2. Dysfunction of both eustachian tubes    Other orders  -     $ Binocular Microscopy    On examination today the right PE tube is in place, patent and functioning with a well aerated middle ear.  Left PE tube is also in place but there is some dried blood around the tube on the  tympanic membrane.  I do not see any active bleeding or granulation tissue.  The lumen of the tube does have blood around it.  I am going to have him use Ciprodex drops twice daily for the next 5 days in the left ear only.  We will see him back on an as-needed basis.       Follow Up   No follow-ups on file.  Patient was given instructions and counseling regarding his condition or for health maintenance advice. Please see specific information pulled into the AVS if appropriate.     Brigid Zurita, APRN

## 2022-11-08 RX ORDER — LEVOTHYROXINE SODIUM 0.07 MG/1
75 TABLET ORAL DAILY
Qty: 90 TABLET | Refills: 0 | Status: SHIPPED | OUTPATIENT
Start: 2022-11-08 | End: 2023-02-08 | Stop reason: SDUPTHER

## 2022-11-16 ENCOUNTER — CLINICAL SUPPORT (OUTPATIENT)
Dept: INTERNAL MEDICINE | Facility: CLINIC | Age: 52
End: 2022-11-16

## 2022-11-16 DIAGNOSIS — E06.3 HYPOTHYROIDISM DUE TO HASHIMOTO'S THYROIDITIS: Primary | ICD-10-CM

## 2022-11-16 DIAGNOSIS — E03.8 HYPOTHYROIDISM DUE TO HASHIMOTO'S THYROIDITIS: Primary | ICD-10-CM

## 2022-11-16 DIAGNOSIS — E78.5 HYPERLIPIDEMIA LDL GOAL <70: ICD-10-CM

## 2022-11-16 LAB
ALBUMIN SERPL-MCNC: 4.1 G/DL (ref 3.5–5.2)
ALBUMIN/GLOB SERPL: 1.5 G/DL
ALP SERPL-CCNC: 89 U/L (ref 39–117)
ALT SERPL W P-5'-P-CCNC: 21 U/L (ref 1–41)
ANION GAP SERPL CALCULATED.3IONS-SCNC: 13.5 MMOL/L (ref 5–15)
AST SERPL-CCNC: 17 U/L (ref 1–40)
BASOPHILS # BLD AUTO: 0.07 10*3/MM3 (ref 0–0.2)
BASOPHILS NFR BLD AUTO: 0.8 % (ref 0–1.5)
BILIRUB SERPL-MCNC: 0.5 MG/DL (ref 0–1.2)
BUN SERPL-MCNC: 15 MG/DL (ref 6–20)
BUN/CREAT SERPL: 15.3 (ref 7–25)
CALCIUM SPEC-SCNC: 9.4 MG/DL (ref 8.6–10.5)
CHLORIDE SERPL-SCNC: 103 MMOL/L (ref 98–107)
CHOLEST SERPL-MCNC: 150 MG/DL (ref 0–200)
CO2 SERPL-SCNC: 21.5 MMOL/L (ref 22–29)
CREAT SERPL-MCNC: 0.98 MG/DL (ref 0.76–1.27)
DEPRECATED RDW RBC AUTO: 43.8 FL (ref 37–54)
EGFRCR SERPLBLD CKD-EPI 2021: 92.8 ML/MIN/1.73
EOSINOPHIL # BLD AUTO: 0.28 10*3/MM3 (ref 0–0.4)
EOSINOPHIL NFR BLD AUTO: 3.3 % (ref 0.3–6.2)
ERYTHROCYTE [DISTWIDTH] IN BLOOD BY AUTOMATED COUNT: 13.2 % (ref 12.3–15.4)
GLOBULIN UR ELPH-MCNC: 2.7 GM/DL
GLUCOSE SERPL-MCNC: 97 MG/DL (ref 65–99)
HCT VFR BLD AUTO: 40.6 % (ref 37.5–51)
HDLC SERPL-MCNC: 37 MG/DL (ref 40–60)
HGB BLD-MCNC: 13.7 G/DL (ref 13–17.7)
IMM GRANULOCYTES # BLD AUTO: 0.02 10*3/MM3 (ref 0–0.05)
IMM GRANULOCYTES NFR BLD AUTO: 0.2 % (ref 0–0.5)
LDLC SERPL CALC-MCNC: 85 MG/DL (ref 0–100)
LDLC/HDLC SERPL: 2.19 {RATIO}
LYMPHOCYTES # BLD AUTO: 2.34 10*3/MM3 (ref 0.7–3.1)
LYMPHOCYTES NFR BLD AUTO: 27.6 % (ref 19.6–45.3)
MCH RBC QN AUTO: 30.6 PG (ref 26.6–33)
MCHC RBC AUTO-ENTMCNC: 33.7 G/DL (ref 31.5–35.7)
MCV RBC AUTO: 90.6 FL (ref 79–97)
MONOCYTES # BLD AUTO: 0.54 10*3/MM3 (ref 0.1–0.9)
MONOCYTES NFR BLD AUTO: 6.4 % (ref 5–12)
NEUTROPHILS NFR BLD AUTO: 5.23 10*3/MM3 (ref 1.7–7)
NEUTROPHILS NFR BLD AUTO: 61.7 % (ref 42.7–76)
NRBC BLD AUTO-RTO: 0 /100 WBC (ref 0–0.2)
PLATELET # BLD AUTO: 343 10*3/MM3 (ref 140–450)
PMV BLD AUTO: 9.6 FL (ref 6–12)
POTASSIUM SERPL-SCNC: 3.1 MMOL/L (ref 3.5–5.2)
PROT SERPL-MCNC: 6.8 G/DL (ref 6–8.5)
RBC # BLD AUTO: 4.48 10*6/MM3 (ref 4.14–5.8)
SODIUM SERPL-SCNC: 138 MMOL/L (ref 136–145)
T4 FREE SERPL-MCNC: 1.21 NG/DL (ref 0.93–1.7)
TRIGL SERPL-MCNC: 159 MG/DL (ref 0–150)
TSH SERPL DL<=0.05 MIU/L-ACNC: 1.84 UIU/ML (ref 0.27–4.2)
VLDLC SERPL-MCNC: 28 MG/DL (ref 5–40)
WBC NRBC COR # BLD: 8.48 10*3/MM3 (ref 3.4–10.8)

## 2022-11-16 PROCEDURE — 80050 GENERAL HEALTH PANEL: CPT | Performed by: NURSE PRACTITIONER

## 2022-11-16 PROCEDURE — 80061 LIPID PANEL: CPT | Performed by: NURSE PRACTITIONER

## 2022-11-16 PROCEDURE — 84439 ASSAY OF FREE THYROXINE: CPT | Performed by: NURSE PRACTITIONER

## 2022-11-18 ENCOUNTER — TELEPHONE (OUTPATIENT)
Dept: INTERNAL MEDICINE | Facility: CLINIC | Age: 52
End: 2022-11-18

## 2022-11-18 ENCOUNTER — OFFICE VISIT (OUTPATIENT)
Dept: INTERNAL MEDICINE | Facility: CLINIC | Age: 52
End: 2022-11-18

## 2022-11-18 VITALS
WEIGHT: 263.2 LBS | HEART RATE: 104 BPM | TEMPERATURE: 97.3 F | DIASTOLIC BLOOD PRESSURE: 70 MMHG | HEIGHT: 69 IN | BODY MASS INDEX: 38.98 KG/M2 | OXYGEN SATURATION: 98 % | SYSTOLIC BLOOD PRESSURE: 130 MMHG

## 2022-11-18 DIAGNOSIS — E87.6 HYPOKALEMIA: ICD-10-CM

## 2022-11-18 DIAGNOSIS — F33.42 RECURRENT MAJOR DEPRESSIVE DISORDER, IN FULL REMISSION: Primary | ICD-10-CM

## 2022-11-18 DIAGNOSIS — I10 PRIMARY HYPERTENSION: ICD-10-CM

## 2022-11-18 DIAGNOSIS — E06.3 HYPOTHYROIDISM DUE TO HASHIMOTO'S THYROIDITIS: ICD-10-CM

## 2022-11-18 DIAGNOSIS — R41.3 MEMORY LOSS: ICD-10-CM

## 2022-11-18 DIAGNOSIS — H69.83 DYSFUNCTION OF BOTH EUSTACHIAN TUBES: ICD-10-CM

## 2022-11-18 DIAGNOSIS — E66.01 CLASS 2 SEVERE OBESITY WITH SERIOUS COMORBIDITY AND BODY MASS INDEX (BMI) OF 38.0 TO 38.9 IN ADULT, UNSPECIFIED OBESITY TYPE: ICD-10-CM

## 2022-11-18 DIAGNOSIS — E78.2 MIXED HYPERLIPIDEMIA: ICD-10-CM

## 2022-11-18 DIAGNOSIS — I25.10 CORONARY ARTERY DISEASE WITHOUT ANGINA PECTORIS, UNSPECIFIED VESSEL OR LESION TYPE, UNSPECIFIED WHETHER NATIVE OR TRANSPLANTED HEART: ICD-10-CM

## 2022-11-18 DIAGNOSIS — E03.8 HYPOTHYROIDISM DUE TO HASHIMOTO'S THYROIDITIS: ICD-10-CM

## 2022-11-18 PROCEDURE — 99214 OFFICE O/P EST MOD 30 MIN: CPT | Performed by: INTERNAL MEDICINE

## 2022-11-18 PROCEDURE — 90686 IIV4 VACC NO PRSV 0.5 ML IM: CPT | Performed by: INTERNAL MEDICINE

## 2022-11-18 PROCEDURE — 90471 IMMUNIZATION ADMIN: CPT | Performed by: INTERNAL MEDICINE

## 2022-11-18 RX ORDER — LOSARTAN POTASSIUM 100 MG/1
100 TABLET ORAL DAILY
Qty: 90 TABLET | Refills: 1 | Status: SHIPPED | OUTPATIENT
Start: 2022-11-18 | End: 2023-02-26 | Stop reason: SDUPTHER

## 2022-11-18 NOTE — TELEPHONE ENCOUNTER
Pharmacy Name: [facility] [address]  [place] - [address] [facility] - [phone] [facility] [phone]      Pharmacy representative phone number: [phone]    What medication are you calling in regards to:   Liraglutide (SAXENDA) 18 MG/3ML injection pen    What question does the pharmacy have:   MEDICATION DOSAGE NEEDS TO BE CHANGED. THEY WOULD LIKE A CALL TO DISCUSS THIS.     Who is the provider that prescribed the medication: DIMITRI

## 2022-11-18 NOTE — PROGRESS NOTES
"Chief Complaint  Hyperlipidemia (F/u), Hypertension (F/u ), Obesity (F/u), and Follow-up (6 month )    Subjective          Yousuf Em presents to [facility]  History of Present Illness     Reviewed urgent care and ENT notes  Still having some drainage from his ear  Uses drops for this as needed  No fever  Slight cough    No chest pain  No trouble breathing  No leg cramping    Current stress meds are helping  Has a job at work that is more stressful, but doing ok    He is still noticing some fatigue  Dr. Cordero stopped his beta blocker    Does noticed getting a little winded with going from sitting to standing    Frustrated with his memory      Objective   Vital Signs:   /70 (BP Location: Left arm, Patient Position: Sitting, Cuff Size: Adult)   Pulse 104   Temp 97.3 °F (36.3 °C)   Ht 175.3 cm (69\")   Wt 119 kg (263 lb 3.2 oz)   SpO2 98%   BMI 38.87 kg/m²     Physical Exam  Vitals reviewed.   Constitutional:       Appearance: Normal appearance. He is well-developed. He is obese.   HENT:      Head: Normocephalic and atraumatic.      Right Ear: External ear normal.      Left Ear: External ear normal.   Eyes:      Conjunctiva/sclera: Conjunctivae normal.      Pupils: Pupils are equal, round, and reactive to light.   Cardiovascular:      Rate and Rhythm: Normal rate and regular rhythm.      Heart sounds: No murmur heard.    No friction rub. No gallop.   Pulmonary:      Effort: Pulmonary effort is normal.      Breath sounds: Normal breath sounds. No wheezing or rhonchi.   Skin:     General: Skin is warm and dry.   Neurological:      Mental Status: He is alert and oriented to person, place, and time.   Psychiatric:         Mood and Affect: Affect normal.         Behavior: Behavior normal.         Thought Content: Thought content normal.        Result Review :       Common labs    Common Labs 5/5/22 5/5/22 5/6/22 5/6/22 11/16/22 11/16/22 11/16/22    0512 0512 " 0415 0415 0952 0952 0952   Glucose  100 (A)  120 (A)  97    BUN  18  15  15    Creatinine  1.05  1.10  0.98    Sodium  139  138  138    Potassium  3.6  3.5  3.1 (A)    Chloride  103  103  103    Calcium  9.3  9.4  9.4    Albumin      4.10    Total Bilirubin      0.5    Alkaline Phosphatase      89    AST (SGOT)      17    ALT (SGPT)      21    WBC 7.43  7.30    8.48   Hemoglobin 13.9  14.1    13.7   Hematocrit 38.9  39.1    40.6   Platelets 290  296    343   Total Cholesterol     150     Triglycerides     159 (A)     HDL Cholesterol     37 (A)     LDL Cholesterol      85     (A) Abnormal value       Comments are available for some flowsheets but are not being displayed.                    Procedures        Assessment and Plan    Diagnoses and all orders for this visit:    1. Recurrent major depressive disorder, in full remission (HCC) (Primary)  Comments:  doing well on current meds    2. Hypokalemia  Comments:  will try stopping chlorthalidone and see if this helps and then recheck bmp in one month  Orders:  -     Basic Metabolic Panel    3. Mixed hyperlipidemia  Comments:  stable, cont current meds    4. Primary hypertension  Comments:  will monitor closely increase losartan as stopping chlorthalidone; discussed potassium coudl get worse will manoj    5. Dysfunction of both eustachian tubes  Comments:  stable cont to work with ENT    6. Hypothyroidism due to Hashimoto's thyroiditis  Comments:  stable, cont to monitor    7. Coronary artery disease without angina pectoris, unspecified vessel or lesion type, unspecified whether native or transplanted heart  Comments:  doing great, cont to follow [facility] cardiology    8. Memory loss  Comments:  started in [place], likely stress related, will check B12 next time do labs  Orders:  -     Vitamin B12 & Folate    9. Class 2 severe obesity with serious comorbidity and body mass index (BMI) of 38.0 to 38.9 in adult, unspecified obesity type (HCC)  Comments:  discussed how to  take; stopped topamax    Other orders  -     FluLaval/Fluarix/Fluzone >6 Months  -     Liraglutide (SAXENDA) 18 MG/3ML injection pen; Inject 0.6mg under skin daily for week one, THEN 1.2mg daily for week two, THEN 1.8mg daily for week three, then 2.4mg daily for week four.  Dispense: 3 mL; Refill: 1  -     losartan (COZAAR) 100 MG tablet; Take 1 tablet by mouth Daily.  Dispense: 90 tablet; Refill: 1  -     Cancel: Pneumococcal Conjugate Vaccine 20-Valent (PCV20)                  Follow Up   Return in about 4 months (around 3/18/2023).  Patient was given instructions and counseling regarding his condition or for health maintenance advice. Please see specific information pulled into the AVS if appropriate.

## 2022-11-28 ENCOUNTER — PRIOR AUTHORIZATION (OUTPATIENT)
Dept: INTERNAL MEDICINE | Facility: CLINIC | Age: 52
End: 2022-11-28

## 2022-12-15 NOTE — TELEPHONE ENCOUNTER
Pa denied     The requested drug is for weight loss which is not covered under your pharmacy benefit.

## 2022-12-16 NOTE — TELEPHONE ENCOUNTER
We can try prescribing ozempic 0.25mg weekly with diagnosis of impaired fasting glucose and see if they will cover that.  Otherwise it would have to be topamax pills, they are cheap, but often have side effects.  Does he have a preference?

## 2022-12-21 NOTE — TELEPHONE ENCOUNTER
Spoke with patient and he states he has been on Topamax before with zero results patient would like to try Ozempic

## 2022-12-29 RX ORDER — SEMAGLUTIDE 1.34 MG/ML
0.25 INJECTION, SOLUTION SUBCUTANEOUS WEEKLY
Qty: 3 ML | Refills: 0 | Status: SHIPPED | OUTPATIENT
Start: 2022-12-29 | End: 2023-01-31

## 2023-01-25 ENCOUNTER — PRIOR AUTHORIZATION (OUTPATIENT)
Dept: INTERNAL MEDICINE | Facility: CLINIC | Age: 53
End: 2023-01-25
Payer: COMMERCIAL

## 2023-01-27 ENCOUNTER — TELEPHONE (OUTPATIENT)
Dept: INTERNAL MEDICINE | Facility: CLINIC | Age: 53
End: 2023-01-27
Payer: COMMERCIAL

## 2023-01-27 DIAGNOSIS — R63.4 WEIGHT REDUCTION: ICD-10-CM

## 2023-01-27 DIAGNOSIS — Z78.9 ADVISED ABOUT MANAGEMENT OF WEIGHT: Primary | ICD-10-CM

## 2023-01-27 NOTE — TELEPHONE ENCOUNTER
Caller: Yousuf Em    Relationship: Self    Best call back number: [phone]     What medication are you requesting: WEGOVY    What are your current symptoms: FOR WEIGHT LOSS    How long have you been experiencing symptoms:     Have you had these symptoms before:    [] Yes  [] No    Have you been treated for these symptoms before:   [] Yes  [] No    If a prescription is needed, what is your preferred pharmacy and phone number: [facility] [address] [place] [address] [phone] [facility] [phone] FX     Additional notes: PATIENT HAS NEW INSURANCE AND WOULD LIKE THIS TO BE SENT TO PHARMACY

## 2023-01-31 RX ORDER — SEMAGLUTIDE 0.25 MG/.5ML
0.25 INJECTION, SOLUTION SUBCUTANEOUS WEEKLY
Qty: 2.5 ML | Refills: 1 | Status: SHIPPED | OUTPATIENT
Start: 2023-01-31 | End: 2023-03-20

## 2023-01-31 NOTE — TELEPHONE ENCOUNTER
Yes ok to send, but clarify that he isn't taking any other injectable.  He shouldn't be doing ozempic and wegovy.. one or the other.  If sending wegovy need to send the lowest dose injected once a week.

## 2023-02-08 RX ORDER — LEVOTHYROXINE SODIUM 0.07 MG/1
75 TABLET ORAL DAILY
Qty: 90 TABLET | Refills: 0 | Status: SHIPPED | OUTPATIENT
Start: 2023-02-08 | End: 2023-05-12

## 2023-02-15 ENCOUNTER — OFFICE VISIT (OUTPATIENT)
Dept: CARDIOLOGY | Facility: CLINIC | Age: 53
End: 2023-02-15
Payer: COMMERCIAL

## 2023-02-15 VITALS
SYSTOLIC BLOOD PRESSURE: 130 MMHG | HEART RATE: 87 BPM | HEIGHT: 69 IN | BODY MASS INDEX: 39.1 KG/M2 | DIASTOLIC BLOOD PRESSURE: 86 MMHG | WEIGHT: 264 LBS

## 2023-02-15 DIAGNOSIS — I25.10 CORONARY ARTERY DISEASE INVOLVING NATIVE CORONARY ARTERY OF NATIVE HEART WITHOUT ANGINA PECTORIS: Primary | ICD-10-CM

## 2023-02-15 DIAGNOSIS — I10 ESSENTIAL HYPERTENSION: ICD-10-CM

## 2023-02-15 DIAGNOSIS — E78.5 HYPERLIPIDEMIA LDL GOAL <70: ICD-10-CM

## 2023-02-15 PROCEDURE — 99214 OFFICE O/P EST MOD 30 MIN: CPT | Performed by: INTERNAL MEDICINE

## 2023-02-15 RX ORDER — ATORVASTATIN CALCIUM 80 MG/1
80 TABLET, FILM COATED ORAL DAILY
Qty: 90 TABLET | Refills: 3 | Status: SHIPPED | OUTPATIENT
Start: 2023-02-15

## 2023-02-15 RX ORDER — MULTIPLE VITAMINS W/ MINERALS TAB 9MG-400MCG
1 TAB ORAL DAILY
COMMUNITY

## 2023-02-15 RX ORDER — CHLORAL HYDRATE 500 MG
CAPSULE ORAL
COMMUNITY

## 2023-02-15 NOTE — PROGRESS NOTES
Chief Complaint  Coronary Artery Disease, Hyperlipidemia, and Hypertension    Subjective            Yousuf Em presents to [facility] CARDIOLOGY  History of Present Illness    Mr. Em is here for follow-up evaluation management of coronary artery disease with previous PCI to the circumflex May 2022, essential hypertension, mixed hyperlipidemia.  Overall he is doing relatively well.  He still has some fatigue.  He is compliant with CPAP therapy.  He denies chest pain.  He is trying to change his diet and exercise more.    PMH  Past Medical History:   Diagnosis Date   • ADHD (attention deficit hyperactivity disorder) 2008   • Allergic 2000   • AR (allergic rhinitis)    • Arthritis    • Colitis    • Colon polyp 2005   • Depression    • ETD (eustachian tube dysfunction)    • Forgetfulness    • Hearing loss    • High cholesterol    • Hyperlipemia    • Hypertension    • Hypothyroidism 2005   • Inflammatory bowel disease 2005   • Labral tear of long head of biceps tendon, initial encounter 09/30/2015   • Labral tear of shoulder, left, sequela 09/25/2015   • Obesity 2010   • Right lateral epicondylitis 10/21/2016   • Seasonal allergies    • Thyroid disorder          SURGICALHX  Past Surgical History:   Procedure Laterality Date   • CARDIAC CATHETERIZATION N/A 5/5/2022    Procedure: Left Heart Cath;  Surgeon: Samy Kaur MD;  [facility];  Service: Cardiovascular;  Laterality: N/A;   • CARDIAC CATHETERIZATION N/A 5/5/2022    Procedure: Percutaneous Coronary Intervention;  Surgeon: Samy Kaur MD;  Location: [facility] CATH INVASIVE LOCATION;  Service: Cardiovascular;  Laterality: N/A;   • CHOLECYSTECTOMY  2000   • COLONOSCOPY     • EAR TUBES     • GALLBLADDER SURGERY     • OTHER SURGICAL HISTORY      JOINT SURGERY        SOC  Social History     Socioeconomic History   • Marital status: Single   Tobacco Use   • Smoking status: Never   • Smokeless tobacco: Never  "  Vaping Use   • Vaping Use: Never used   Substance and Sexual Activity   • Alcohol use: Never   • Drug use: Never   • Sexual activity: Never         FAMHX  Family History   Problem Relation Age of Onset   • Arthritis Mother    • Cancer Mother    • Heart disease Mother    • Diabetes Mother    • Heart disease Father    • Heart disease Sister    • Stroke Maternal Grandmother           ALLERGY  No Known Allergies     MEDSCURRENT    Current Outpatient Medications:   •  aspirin 81 MG chewable tablet, Chew 1 tablet Daily., Disp: , Rfl:   •  atorvastatin (LIPITOR) 80 MG tablet, Take 1 tablet by mouth Daily., Disp: 90 tablet, Rfl: 3  •  cetirizine (zyrTEC) 10 MG tablet, Take 1 tablet by mouth Daily., Disp: , Rfl:   •  desvenlafaxine (PRISTIQ) 100 MG 24 hr tablet, Take 1 tablet by mouth Daily., Disp: 90 tablet, Rfl: 1  •  levothyroxine (SYNTHROID, LEVOTHROID) 75 MCG tablet, Take 1 tablet by mouth Daily for 90 days., Disp: 90 tablet, Rfl: 0  •  losartan (COZAAR) 100 MG tablet, Take 1 tablet by mouth Daily., Disp: 90 tablet, Rfl: 1  •  multivitamin with minerals (MULTIVITAMIN ADULT PO), Take 1 tablet by mouth Daily., Disp: , Rfl:   •  Omega-3 Fatty Acids (fish oil) 1000 MG capsule capsule, Take  by mouth Daily With Breakfast., Disp: , Rfl:   •  Semaglutide-Weight Management (Wegovy) 0.25 MG/0.5ML solution auto-injector, Inject 0.25 mg under the skin into the appropriate area as directed 1 (One) Time Per Week., Disp: 2.5 mL, Rfl: 1  •  ticagrelor (BRILINTA) 90 MG tablet tablet, Take 1 tablet by mouth 2 (Two) Times a Day for 30 days., Disp: 180 tablet, Rfl: 3      Review of Systems   Cardiovascular: Negative for chest pain, dyspnea on exertion and palpitations.   Respiratory: Negative for shortness of breath.         Objective     /86   Pulse 87   Ht 175.3 cm (69\")   Wt 120 kg (264 lb)   BMI 38.99 kg/m²       General Appearance:   · well developed  · well nourished  HENT:   · oropharynx moist  · lips not " cyanotic  Neck:  · thyroid not enlarged  · supple  Respiratory:  · no respiratory distress  · normal breath sounds  · no rales  Cardiovascular:  · no jugular venous distention  · regular rhythm  · apical impulse normal  · S1 normal, S2 normal  · no S3, no S4   · no murmur  · no rub, no thrill  · carotid pulses normal; no bruit  · pedal pulses normal  · lower extremity edema: none    Musculoskeletal:  · no clubbing of fingers.   · normocephalic, head atraumatic  Skin:   · warm, dry  Psychiatric:  · judgement and insight appropriate  · normal mood and affect      Result Review :     The following data was reviewed by: Yovani Cordero MD on 02/15/2023:    CMP    CMP 5/5/22 5/6/22 11/16/22   Glucose 100 (A) 120 (A) 97   BUN 18 15 15   Creatinine 1.05 1.10 0.98   eGFR 85.9 81.3 92.8   Sodium 139 138 138   Potassium 3.6 3.5 3.1 (A)   Chloride 103 103 103   Calcium 9.3 9.4 9.4   Total Protein   6.8   Albumin   4.10   Globulin   2.7   Total Bilirubin   0.5   Alkaline Phosphatase   89   AST (SGOT)   17   ALT (SGPT)   21   Albumin/Globulin Ratio   1.5   BUN/Creatinine Ratio 17.1 13.6 15.3   Anion Gap 9.6 10.4 13.5   (A) Abnormal value       Comments are available for some flowsheets but are not being displayed.           CBC    CBC 5/5/22 5/6/22 11/16/22   WBC 7.43 7.30 8.48   RBC 4.36 4.40 4.48   Hemoglobin 13.9 14.1 13.7   Hematocrit 38.9 39.1 40.6   MCV 89.2 88.9 90.6   MCH 31.9 32.0 30.6   MCHC 35.7 36.1 (A) 33.7   RDW 12.8 12.9 13.2   Platelets 290 296 343   (A) Abnormal value            Lipid Panel    Lipid Panel 5/4/22 5/4/22 11/16/22    1725 1928    Total Cholesterol 176 169 150   Triglycerides 305 (A) 232 (A) 159 (A)   HDL Cholesterol 31 (A) 33 (A) 37 (A)   VLDL Cholesterol 51 (A) 40 28   LDL Cholesterol  94 96 85   LDL/HDL Ratio 2.71 2.72 2.19   (A) Abnormal value            TSH    TSH 11/16/22   TSH 1.840             Data reviewed: Primary care records reviewed     Procedures             Assessment and  Plan        ASSESSMENT:  Encounter Diagnoses   Name Primary?   • Coronary artery disease involving native coronary artery of native heart without angina pectoris Yes   • Essential hypertension    • Hyperlipidemia LDL goal <70          PLAN:    1.  Coronary artery disease with PCI of the left circumflex May 2022, he is without angina currently.  Continue dual antiplatelet therapy and statin therapy.  2.  Essential hypertension-stable, continue current medical therapy  3.  Hyperlipidemia, LDL goal less than 70-continue to work on nutrition changes and weight loss.  Continue statin therapy.    Follow-up in about 8 months unless problems arise          Patient was given instructions and counseling regarding his condition or for health maintenance advice. Please see specific information pulled into the AVS if appropriate.             RISA Cordero MD  2/15/2023    14:12 EST

## 2023-02-27 RX ORDER — LOSARTAN POTASSIUM 100 MG/1
100 TABLET ORAL DAILY
Qty: 90 TABLET | Refills: 1 | Status: SHIPPED | OUTPATIENT
Start: 2023-02-27

## 2023-03-18 NOTE — NURSING NOTE
Cardiac Rehab Phase I - Occurrence #1    Education Topics:  Cardiac Rehab yes   No Phase I need assessed no     Topic Components:  Exercise yes     Teaching Aids:  Phase 2 Brochure yes     Teaching Method:  Written Instruction yes     Teaching Recipient:  Patient yes       Recovery/Discharge Instructions:  Cardiac Rehab Phase 2 yes     Cardiac Rehab Phase I Comm   Phase 2 cardiac rehab information mailed to patient.  
N/A

## 2023-03-20 ENCOUNTER — OFFICE VISIT (OUTPATIENT)
Dept: INTERNAL MEDICINE | Facility: CLINIC | Age: 53
End: 2023-03-20
Payer: COMMERCIAL

## 2023-03-20 VITALS
TEMPERATURE: 97.4 F | DIASTOLIC BLOOD PRESSURE: 78 MMHG | WEIGHT: 258.2 LBS | SYSTOLIC BLOOD PRESSURE: 128 MMHG | HEART RATE: 74 BPM | OXYGEN SATURATION: 96 % | BODY MASS INDEX: 38.24 KG/M2 | HEIGHT: 69 IN

## 2023-03-20 DIAGNOSIS — E78.2 MIXED HYPERLIPIDEMIA: Primary | ICD-10-CM

## 2023-03-20 DIAGNOSIS — E66.9 OBESITY (BMI 30-39.9): ICD-10-CM

## 2023-03-20 DIAGNOSIS — J30.2 SEASONAL ALLERGIC RHINITIS, UNSPECIFIED TRIGGER: ICD-10-CM

## 2023-03-20 DIAGNOSIS — I10 PRIMARY HYPERTENSION: ICD-10-CM

## 2023-03-20 DIAGNOSIS — I20.8 EXERTIONAL ANGINA: ICD-10-CM

## 2023-03-20 DIAGNOSIS — E06.3 HYPOTHYROIDISM DUE TO HASHIMOTO'S THYROIDITIS: ICD-10-CM

## 2023-03-20 DIAGNOSIS — I25.118 CORONARY ARTERY DISEASE INVOLVING NATIVE HEART WITH OTHER FORM OF ANGINA PECTORIS, UNSPECIFIED VESSEL OR LESION TYPE: ICD-10-CM

## 2023-03-20 DIAGNOSIS — E03.8 HYPOTHYROIDISM DUE TO HASHIMOTO'S THYROIDITIS: ICD-10-CM

## 2023-03-20 DIAGNOSIS — F33.42 RECURRENT MAJOR DEPRESSIVE DISORDER, IN FULL REMISSION: ICD-10-CM

## 2023-03-20 LAB
ALBUMIN SERPL-MCNC: 4.2 G/DL (ref 3.5–5.2)
ALBUMIN/GLOB SERPL: 1.2 G/DL
ALP SERPL-CCNC: 110 U/L (ref 39–117)
ALT SERPL W P-5'-P-CCNC: 26 U/L (ref 1–41)
ANION GAP SERPL CALCULATED.3IONS-SCNC: 11 MMOL/L (ref 5–15)
AST SERPL-CCNC: 22 U/L (ref 1–40)
BASOPHILS # BLD AUTO: 0.04 10*3/MM3 (ref 0–0.2)
BASOPHILS NFR BLD AUTO: 0.5 % (ref 0–1.5)
BILIRUB SERPL-MCNC: 0.4 MG/DL (ref 0–1.2)
BUN SERPL-MCNC: 13 MG/DL (ref 6–20)
BUN/CREAT SERPL: 12.9 (ref 7–25)
CALCIUM SPEC-SCNC: 9.6 MG/DL (ref 8.6–10.5)
CHLORIDE SERPL-SCNC: 104 MMOL/L (ref 98–107)
CHOLEST SERPL-MCNC: 132 MG/DL (ref 0–200)
CO2 SERPL-SCNC: 24 MMOL/L (ref 22–29)
CREAT SERPL-MCNC: 1.01 MG/DL (ref 0.76–1.27)
DEPRECATED RDW RBC AUTO: 43.8 FL (ref 37–54)
EGFRCR SERPLBLD CKD-EPI 2021: 89.5 ML/MIN/1.73
EOSINOPHIL # BLD AUTO: 0.18 10*3/MM3 (ref 0–0.4)
EOSINOPHIL NFR BLD AUTO: 2.3 % (ref 0.3–6.2)
ERYTHROCYTE [DISTWIDTH] IN BLOOD BY AUTOMATED COUNT: 13.4 % (ref 12.3–15.4)
FOLATE SERPL-MCNC: 13.8 NG/ML (ref 4.78–24.2)
GLOBULIN UR ELPH-MCNC: 3.4 GM/DL
GLUCOSE SERPL-MCNC: 102 MG/DL (ref 65–99)
HCT VFR BLD AUTO: 40.1 % (ref 37.5–51)
HDLC SERPL-MCNC: 31 MG/DL (ref 40–60)
HGB BLD-MCNC: 13.8 G/DL (ref 13–17.7)
IMM GRANULOCYTES # BLD AUTO: 0.02 10*3/MM3 (ref 0–0.05)
IMM GRANULOCYTES NFR BLD AUTO: 0.3 % (ref 0–0.5)
LDLC SERPL CALC-MCNC: 74 MG/DL (ref 0–100)
LDLC/HDLC SERPL: 2.26 {RATIO}
LYMPHOCYTES # BLD AUTO: 2.06 10*3/MM3 (ref 0.7–3.1)
LYMPHOCYTES NFR BLD AUTO: 26.4 % (ref 19.6–45.3)
MCH RBC QN AUTO: 30.9 PG (ref 26.6–33)
MCHC RBC AUTO-ENTMCNC: 34.4 G/DL (ref 31.5–35.7)
MCV RBC AUTO: 89.7 FL (ref 79–97)
MONOCYTES # BLD AUTO: 0.52 10*3/MM3 (ref 0.1–0.9)
MONOCYTES NFR BLD AUTO: 6.7 % (ref 5–12)
NEUTROPHILS NFR BLD AUTO: 4.99 10*3/MM3 (ref 1.7–7)
NEUTROPHILS NFR BLD AUTO: 63.8 % (ref 42.7–76)
NRBC BLD AUTO-RTO: 0 /100 WBC (ref 0–0.2)
PLATELET # BLD AUTO: 346 10*3/MM3 (ref 140–450)
PMV BLD AUTO: 9.8 FL (ref 6–12)
POTASSIUM SERPL-SCNC: 4 MMOL/L (ref 3.5–5.2)
PROT SERPL-MCNC: 7.6 G/DL (ref 6–8.5)
RBC # BLD AUTO: 4.47 10*6/MM3 (ref 4.14–5.8)
SODIUM SERPL-SCNC: 139 MMOL/L (ref 136–145)
TRIGL SERPL-MCNC: 154 MG/DL (ref 0–150)
TSH SERPL DL<=0.05 MIU/L-ACNC: 1.64 UIU/ML (ref 0.27–4.2)
VIT B12 BLD-MCNC: 799 PG/ML (ref 211–946)
VLDLC SERPL-MCNC: 27 MG/DL (ref 5–40)
WBC NRBC COR # BLD: 7.81 10*3/MM3 (ref 3.4–10.8)

## 2023-03-20 PROCEDURE — 84443 ASSAY THYROID STIM HORMONE: CPT | Performed by: INTERNAL MEDICINE

## 2023-03-20 PROCEDURE — 80061 LIPID PANEL: CPT | Performed by: INTERNAL MEDICINE

## 2023-03-20 PROCEDURE — 80053 COMPREHEN METABOLIC PANEL: CPT | Performed by: INTERNAL MEDICINE

## 2023-03-20 PROCEDURE — 82607 VITAMIN B-12: CPT | Performed by: INTERNAL MEDICINE

## 2023-03-20 PROCEDURE — 85025 COMPLETE CBC W/AUTO DIFF WBC: CPT | Performed by: INTERNAL MEDICINE

## 2023-03-20 PROCEDURE — 82746 ASSAY OF FOLIC ACID SERUM: CPT | Performed by: INTERNAL MEDICINE

## 2023-03-20 RX ORDER — OLOPATADINE HYDROCHLORIDE 2 MG/ML
1 SOLUTION/ DROPS OPHTHALMIC DAILY
Qty: 2.5 ML | Refills: 1 | Status: SHIPPED | OUTPATIENT
Start: 2023-03-20

## 2023-03-20 RX ORDER — DESVENLAFAXINE 100 MG/1
100 TABLET, EXTENDED RELEASE ORAL DAILY
Qty: 90 TABLET | Refills: 1 | Status: SHIPPED | OUTPATIENT
Start: 2023-03-20

## 2023-03-20 RX ORDER — SEMAGLUTIDE 0.5 MG/.5ML
0.5 INJECTION, SOLUTION SUBCUTANEOUS WEEKLY
Qty: 2.5 ML | Refills: 1 | Status: SHIPPED | OUTPATIENT
Start: 2023-03-20

## 2023-03-20 NOTE — PROGRESS NOTES
"Chief Complaint  Hypertension (4 month follow up ) and Depression    Subjective          Yousuf Em presents to [facility]  History of Present Illness     States that he started working out a few weeks ago  He developed chest pain as he was working out  States that he was working really hard whenever he was doing this  Otherwise has not had chest pain  No trouble breathing    Really liking the Wegovy  States that it just helps decrease his appetite  Has lost quite a bit of weight with this    Feels like depression and anxiety are well controlled on current medication    Has had some itchy watery eyes recently    Objective   Vital Signs:   /78   Pulse 74   Temp 97.4 °F (36.3 °C) (Temporal)   Ht 175.3 cm (69\")   Wt 117 kg (258 lb 3.2 oz)   SpO2 96%   BMI 38.13 kg/m²     Physical Exam  Vitals reviewed.   Constitutional:       Appearance: Normal appearance. He is well-developed. He is obese.   HENT:      Head: Normocephalic and atraumatic.      Right Ear: External ear normal.      Left Ear: External ear normal.   Eyes:      Conjunctiva/sclera: Conjunctivae normal.      Pupils: Pupils are equal, round, and reactive to light.   Cardiovascular:      Rate and Rhythm: Normal rate and regular rhythm.      Heart sounds: No murmur heard.    No friction rub. No gallop.   Pulmonary:      Effort: Pulmonary effort is normal.      Breath sounds: Normal breath sounds. No wheezing or rhonchi.   Skin:     General: Skin is warm and dry.   Neurological:      Mental Status: He is alert and oriented to person, place, and time.   Psychiatric:         Mood and Affect: Affect normal.         Behavior: Behavior normal.         Thought Content: Thought content normal.        Result Review :       Common labs    Common Labs 5/6/22 5/6/22 11/16/22 11/16/22 11/16/22 3/20/23 3/20/23 3/20/23    0415 0415 0952 0952 0952 0924 0924 0924   Glucose  120 (A)  97   102 (A)    BUN  15  15   13  "   Creatinine  1.10  0.98   1.01    Sodium  138  138   139    Potassium  3.5  3.1 (A)   4.0    Chloride  103  103   104    Calcium  9.4  9.4   9.6    Albumin    4.10   4.2    Total Bilirubin    0.5   0.4    Alkaline Phosphatase    89   110    AST (SGOT)    17   22    ALT (SGPT)    21   26    WBC 7.30    8.48 7.81     Hemoglobin 14.1    13.7 13.8     Hematocrit 39.1    40.6 40.1     Platelets 296    343 346     Total Cholesterol   150     132   Triglycerides   159 (A)     154 (A)   HDL Cholesterol   37 (A)     31 (A)   LDL Cholesterol    85     74   (A) Abnormal value       Comments are available for some flowsheets but are not being displayed.             Results for orders placed or performed in visit on 03/20/23   Comprehensive Metabolic Panel    Specimen: Hand, Left; Blood   Result Value Ref Range    Glucose 102 (H) 65 - 99 mg/dL    BUN 13 6 - 20 mg/dL    Creatinine 1.01 0.76 - 1.27 mg/dL    Sodium 139 136 - 145 mmol/L    Potassium 4.0 3.5 - 5.2 mmol/L    Chloride 104 98 - 107 mmol/L    CO2 24.0 22.0 - 29.0 mmol/L    Calcium 9.6 8.6 - 10.5 mg/dL    Total Protein 7.6 6.0 - 8.5 g/dL    Albumin 4.2 3.5 - 5.2 g/dL    ALT (SGPT) 26 1 - 41 U/L    AST (SGOT) 22 1 - 40 U/L    Alkaline Phosphatase 110 39 - 117 U/L    Total Bilirubin 0.4 0.0 - 1.2 mg/dL    Globulin 3.4 gm/dL    A/G Ratio 1.2 g/dL    BUN/Creatinine Ratio 12.9 7.0 - 25.0    Anion Gap 11.0 5.0 - 15.0 mmol/L    eGFR 89.5 >60.0 mL/min/1.73   TSH    Specimen: Hand, Left; Blood   Result Value Ref Range    TSH 1.640 0.270 - 4.200 uIU/mL   Lipid Panel    Specimen: Hand, Left; Blood   Result Value Ref Range    Total Cholesterol 132 0 - 200 mg/dL    Triglycerides 154 (H) 0 - 150 mg/dL    HDL Cholesterol 31 (L) 40 - 60 mg/dL    LDL Cholesterol  74 0 - 100 mg/dL    VLDL Cholesterol 27 5 - 40 mg/dL    LDL/HDL Ratio 2.26    CBC Auto Differential    Specimen: Hand, Left; Blood   Result Value Ref Range    WBC 7.81 3.40 - 10.80 10*3/mm3    RBC 4.47 4.14 - 5.80 10*6/mm3     Hemoglobin 13.8 13.0 - 17.7 g/dL    Hematocrit 40.1 37.5 - 51.0 %    MCV 89.7 79.0 - 97.0 fL    MCH 30.9 26.6 - 33.0 pg    MCHC 34.4 31.5 - 35.7 g/dL    RDW 13.4 12.3 - 15.4 %    RDW-SD 43.8 37.0 - 54.0 fl    MPV 9.8 6.0 - 12.0 fL    Platelets 346 140 - 450 10*3/mm3    Neutrophil % 63.8 42.7 - 76.0 %    Lymphocyte % 26.4 19.6 - 45.3 %    Monocyte % 6.7 5.0 - 12.0 %    Eosinophil % 2.3 0.3 - 6.2 %    Basophil % 0.5 0.0 - 1.5 %    Immature Grans % 0.3 0.0 - 0.5 %    Neutrophils, Absolute 4.99 1.70 - 7.00 10*3/mm3    Lymphocytes, Absolute 2.06 0.70 - 3.10 10*3/mm3    Monocytes, Absolute 0.52 0.10 - 0.90 10*3/mm3    Eosinophils, Absolute 0.18 0.00 - 0.40 10*3/mm3    Basophils, Absolute 0.04 0.00 - 0.20 10*3/mm3    Immature Grans, Absolute 0.02 0.00 - 0.05 10*3/mm3    nRBC 0.0 0.0 - 0.2 /100 WBC            Procedures        Assessment and Plan    Diagnoses and all orders for this visit:    1. Mixed hyperlipidemia (Primary)  Comments:  doing well check levels and adjust meds    2. Primary hypertension  Comments:  doing great, cont current meds  Orders:  -     Comprehensive Metabolic Panel  -     CBC & Differential  -     TSH  -     Lipid Panel    3. Hypothyroidism due to Hashimoto's thyroiditis  Comments:  check levels and adjust meds  Orders:  -     TSH    4. Recurrent major depressive disorder, in full remission (HCC)  Comments:  cont current meds; doing great    5. Seasonal allergic rhinitis, unspecified trigger  Comments:  will try eye drops    6. Coronary artery disease involving native heart with other form of angina pectoris, unspecified vessel or lesion type (HCC)  Comments:  will try working out a little less intensly; monitor for chest pain; if still with pain will consider cardiac rehab    7. Obesity (BMI 30-39.9)  Comments:  will increase dose; monitor for side effects    8. Exertional angina (HCC)  Comments:  If pain continues may need repeat stress imaging however likely just exertional for now will monitor  closely    Other orders  -     desvenlafaxine (PRISTIQ) 100 MG 24 hr tablet; Take 1 tablet by mouth Daily.  Dispense: 90 tablet; Refill: 1  -     ticagrelor (Brilinta) 90 MG tablet tablet; Take 1 tablet by mouth 2 (Two) Times a Day.  Dispense: 270 tablet; Refill: 1  -     olopatadine (Pataday) 0.2 % solution ophthalmic solution; Administer 1 drop to both eyes Daily.  Dispense: 2.5 mL; Refill: 1  -     Pneumococcal Conjugate Vaccine 20-Valent (PCV20)  -     Semaglutide-Weight Management (Wegovy) 0.5 MG/0.5ML solution auto-injector; Inject 0.5 mL under the skin into the appropriate area as directed 1 (One) Time Per Week.  Dispense: 2.5 mL; Refill: 1        {Class 2 Severe Obesity (BMI >=35 and <=39.9). Obesity-related health conditions include the following: hypertension and coronary heart disease. Obesity is improving with treatment. BMI is is above average; BMI management plan is completed. We discussed portion control, increasing exercise and pharmacologic options including wegovy.            Follow Up   No follow-ups on file.  Patient was given instructions and counseling regarding his condition or for health maintenance advice. Please see specific information pulled into the AVS if appropriate.

## 2023-05-02 RX ORDER — ATORVASTATIN CALCIUM 80 MG/1
80 TABLET, FILM COATED ORAL DAILY
Qty: 90 TABLET | Refills: 3 | OUTPATIENT
Start: 2023-05-02

## 2023-05-02 RX ORDER — LEVOTHYROXINE SODIUM 0.07 MG/1
75 TABLET ORAL DAILY
Qty: 90 TABLET | Refills: 0 | Status: SHIPPED | OUTPATIENT
Start: 2023-05-02 | End: 2023-07-31

## 2023-05-03 ENCOUNTER — TELEPHONE (OUTPATIENT)
Dept: CARDIOLOGY | Facility: CLINIC | Age: 53
End: 2023-05-03
Payer: COMMERCIAL

## 2023-05-03 NOTE — TELEPHONE ENCOUNTER
SW patient regarding CP. Patient states he has noticed for the last month he will get CP a couple hours after working out. Patient states his jaw will hurt when it happens. Did go over s/s to watch for. Also advised between now and appointment if pain gets worse or becomes more frequent to go to ER. Also advised if it happens and does not go away with rest to go to ER. Stressed importance of keeping f/u/

## 2023-05-09 ENCOUNTER — OFFICE VISIT (OUTPATIENT)
Dept: CARDIOLOGY | Facility: CLINIC | Age: 53
End: 2023-05-09
Payer: COMMERCIAL

## 2023-05-09 VITALS
SYSTOLIC BLOOD PRESSURE: 137 MMHG | HEART RATE: 71 BPM | HEIGHT: 69 IN | DIASTOLIC BLOOD PRESSURE: 91 MMHG | WEIGHT: 255.4 LBS | BODY MASS INDEX: 37.83 KG/M2

## 2023-05-09 DIAGNOSIS — I10 ESSENTIAL HYPERTENSION: ICD-10-CM

## 2023-05-09 DIAGNOSIS — E78.5 HYPERLIPIDEMIA LDL GOAL <70: ICD-10-CM

## 2023-05-09 DIAGNOSIS — I25.10 CORONARY ARTERY DISEASE INVOLVING NATIVE CORONARY ARTERY OF NATIVE HEART WITHOUT ANGINA PECTORIS: Primary | ICD-10-CM

## 2023-05-09 RX ORDER — NITROGLYCERIN 0.4 MG/1
0.4 TABLET SUBLINGUAL
Qty: 30 TABLET | Refills: 1 | Status: SHIPPED | OUTPATIENT
Start: 2023-05-09

## 2023-05-09 RX ORDER — ISOSORBIDE MONONITRATE 30 MG/1
30 TABLET, EXTENDED RELEASE ORAL DAILY
Qty: 30 TABLET | Refills: 11 | Status: SHIPPED | OUTPATIENT
Start: 2023-05-09

## 2023-05-09 NOTE — PROGRESS NOTES
Chief Complaint  Chest Pain and Loss of Consciousness    Subjective            Yousuf Em presents to [facility] CARDIOLOGY  History of Present Illness    Yousuf is here today for follow-up evaluation and management of coronary artery disease with previous PCI to the circumflex May 2022, essential hypertension, and mixed hyperlipidemia.  Since his last office visit he had an episode of moderate precordial chest discomfort radiating into his jaw.  He did been working out on a rowing machine when the symptoms began.  Since then he has had intermittent chest tightness typically after activity.  He also reports 1 episode of syncope, this occurred during a vomiting episode and has not since recurred.  Otherwise he denies excessive shortness of breath, palpitations, or dizziness.  He is compliant with his medical therapy.    PMH  Past Medical History:   Diagnosis Date   • ADHD (attention deficit hyperactivity disorder) 2008   • Allergic 2000   • AR (allergic rhinitis)    • Arthritis    • Colitis    • Colon polyp 2005   • Depression    • ETD (eustachian tube dysfunction)    • Forgetfulness    • Hearing loss    • High cholesterol    • Hyperlipemia    • Hypertension    • Hypothyroidism 2005   • Inflammatory bowel disease 2005   • Labral tear of long head of biceps tendon, initial encounter 09/30/2015   • Labral tear of shoulder, left, sequela 09/25/2015   • Obesity 2010   • Right lateral epicondylitis 10/21/2016   • Seasonal allergies    • Thyroid disorder          SURGICALHX  Past Surgical History:   Procedure Laterality Date   • CARDIAC CATHETERIZATION N/A 5/5/2022    Procedure: Left Heart Cath;  Surgeon: Samy Kaur MD;  Location: [facility] INVASIVE LOCATION;  Service: Cardiovascular;  Laterality: N/A;   • CARDIAC CATHETERIZATION N/A 5/5/2022    Procedure: Percutaneous Coronary Intervention;  Surgeon: Samy Kaur MD;  Location: [facility] INVASIVE LOCATION;  Service: Cardiovascular;   Laterality: N/A;   • CHOLECYSTECTOMY  2000   • COLONOSCOPY     • EAR TUBES     • GALLBLADDER SURGERY     • OTHER SURGICAL HISTORY      JOINT SURGERY        SOC  Social History     Socioeconomic History   • Marital status: Single   Tobacco Use   • Smoking status: Never   • Smokeless tobacco: Never   Vaping Use   • Vaping Use: Never used   Substance and Sexual Activity   • Alcohol use: Never   • Drug use: Never   • Sexual activity: Yes     Partners: Female     Birth control/protection: None         FAMHX  Family History   Problem Relation Age of Onset   • Arthritis Mother    • Cancer Mother    • Heart disease Mother    • Diabetes Mother    • Heart disease Father    • Heart disease Sister    • Stroke Maternal Grandmother           ALLERGY  No Known Allergies     MEDSCURRENT    Current Outpatient Medications:   •  aspirin 81 MG chewable tablet, Chew 1 tablet Daily., Disp: , Rfl:   •  atorvastatin (LIPITOR) 80 MG tablet, Take 1 tablet by mouth Daily., Disp: 90 tablet, Rfl: 3  •  cetirizine (zyrTEC) 10 MG tablet, Take 1 tablet by mouth Daily., Disp: , Rfl:   •  desvenlafaxine (PRISTIQ) 100 MG 24 hr tablet, Take 1 tablet by mouth Daily., Disp: 90 tablet, Rfl: 1  •  levothyroxine (SYNTHROID, LEVOTHROID) 75 MCG tablet, Take 1 tablet by mouth Daily for 90 days., Disp: 90 tablet, Rfl: 0  •  losartan (COZAAR) 100 MG tablet, Take 1 tablet by mouth Daily., Disp: 90 tablet, Rfl: 1  •  multivitamin with minerals tablet tablet, Take 1 tablet by mouth Daily., Disp: , Rfl:   •  olopatadine (Pataday) 0.2 % solution ophthalmic solution, Administer 1 drop to both eyes Daily., Disp: 2.5 mL, Rfl: 1  •  Semaglutide-Weight Management (Wegovy) 0.5 MG/0.5ML solution auto-injector, Inject 0.5 mL under the skin into the appropriate area as directed 1 (One) Time Per Week., Disp: 2.5 mL, Rfl: 1  •  ticagrelor (Brilinta) 90 MG tablet tablet, Take 1 tablet by mouth 2 (Two) Times a Day., Disp: 270 tablet, Rfl: 1  •  isosorbide mononitrate (IMDUR) 30  "MG 24 hr tablet, Take 1 tablet by mouth Daily., Disp: 30 tablet, Rfl: 11  •  nitroglycerin (NITROSTAT) 0.4 MG SL tablet, Place 1 tablet under the tongue Every 5 (Five) Minutes As Needed for Chest Pain (Do not take more than 3 at one time. Go to ER or call 911 if chest pain persists). Take no more than 3 doses in 15 minutes., Disp: 30 tablet, Rfl: 1  •  Omega-3 Fatty Acids (fish oil) 1000 MG capsule capsule, Take  by mouth Daily With Breakfast. (Patient not taking: Reported on 3/20/2023), Disp: , Rfl:       Review of Systems   Constitutional: Positive for malaise/fatigue.   Cardiovascular: Positive for chest pain and syncope. Negative for dyspnea on exertion and irregular heartbeat.   Respiratory: Negative for shortness of breath.         Objective     /91   Pulse 71   Ht 175.3 cm (69.02\")   Wt 116 kg (255 lb 6.4 oz)   BMI 37.70 kg/m²       General Appearance:   · well developed  · well nourished  HENT:   · oropharynx moist  · lips not cyanotic  Neck:  · thyroid not enlarged  · supple  Respiratory:  · no respiratory distress  · normal breath sounds  · no rales  Cardiovascular:  · no jugular venous distention  · regular rhythm  · apical impulse normal  · S1 normal, S2 normal  · no S3, no S4   · no murmur  · no rub, no thrill  · carotid pulses normal; no bruit  · pedal pulses normal  · lower extremity edema: none    Musculoskeletal:  · no clubbing of fingers.   · normocephalic, head atraumatic  Skin:   · warm, dry  Psychiatric:  · judgement and insight appropriate  · normal mood and affect      Result Review :         CMP        11/16/2022    09:52 3/20/2023    09:24   CMP   Glucose 97   102     BUN 15   13     Creatinine 0.98   1.01     EGFR 92.8   89.5     Sodium 138   139     Potassium 3.1   4.0     Chloride 103   104     Calcium 9.4   9.6     Total Protein 6.8   7.6     Albumin 4.10   4.2     Globulin 2.7   3.4     Total Bilirubin 0.5   0.4     Alkaline Phosphatase 89   110     AST (SGOT) 17   22     ALT " (SGPT) 21   26     Albumin/Globulin Ratio 1.5   1.2     BUN/Creatinine Ratio 15.3   12.9     Anion Gap 13.5   11.0       CBC        11/16/2022    09:52 3/20/2023    09:24   CBC   WBC 8.48   7.81     RBC 4.48   4.47     Hemoglobin 13.7   13.8     Hematocrit 40.6   40.1     MCV 90.6   89.7     MCH 30.6   30.9     MCHC 33.7   34.4     RDW 13.2   13.4     Platelets 343   346       Lipid Panel        11/16/2022    09:52 3/20/2023    09:24   Lipid Panel   Total Cholesterol 150   132     Triglycerides 159   154     HDL Cholesterol 37   31     VLDL Cholesterol 28   27     LDL Cholesterol  85   74     LDL/HDL Ratio 2.19   2.26         Data reviewed: Cardiology studies Procedure notes reviewed.  Labs reviewed     Procedures      Yousuf Em  reports that he has never smoked. He has never used smokeless tobacco.. I have educated him on the risk of diseases from using tobacco products such as cancer, COPD and heart disease.                Assessment and Plan        ASSESSMENT:  Encounter Diagnoses   Name Primary?   • Coronary artery disease involving native coronary artery of native heart without angina pectoris Yes   • Essential hypertension    • Hyperlipidemia LDL goal <70          PLAN:    1.  Coronary artery disease-stable angina.  Start long-acting nitrate to ideally help with the symptoms.  He is going to notify the office in about a week if pain does not improve.  From there we can titrate anginal medications.  If pain persist can consider stress imaging or repeat coronary angiography.  2.  Essential hypertension-controlled, continue current medical therapy.  3.  Mixed hyperlipidemia-stable statin therapy.    Yousuf is agreeable to the plan of care.  He will follow-up as scheduled in October if symptoms subside otherwise he will notify the office and we will follow-up sooner.      Patient was given instructions and counseling regarding his condition or for health maintenance advice. Please see specific information pulled  into the AVS if appropriate.           Di Lama, APRN   5/9/2023  09:12 EDT

## 2023-05-24 RX ORDER — SEMAGLUTIDE 0.5 MG/.5ML
0.5 INJECTION, SOLUTION SUBCUTANEOUS WEEKLY
Qty: 2.5 ML | Refills: 1 | Status: SHIPPED | OUTPATIENT
Start: 2023-05-24

## 2023-05-24 RX ORDER — RANOLAZINE 500 MG/1
500 TABLET, EXTENDED RELEASE ORAL 2 TIMES DAILY
Qty: 180 TABLET | Refills: 3 | Status: SHIPPED | OUTPATIENT
Start: 2023-05-24

## 2023-05-24 RX ORDER — LEVOTHYROXINE SODIUM 0.07 MG/1
75 TABLET ORAL DAILY
Qty: 90 TABLET | Refills: 0 | Status: SHIPPED | OUTPATIENT
Start: 2023-05-24 | End: 2023-08-22

## 2023-06-26 PROBLEM — Z00.00 ANNUAL PHYSICAL EXAM: Status: ACTIVE | Noted: 2023-06-26

## 2023-06-26 PROBLEM — R53.82 CHRONIC FATIGUE: Status: ACTIVE | Noted: 2023-06-26

## 2023-08-24 ENCOUNTER — PATIENT MESSAGE (OUTPATIENT)
Dept: INTERNAL MEDICINE | Facility: CLINIC | Age: 53
End: 2023-08-24
Payer: COMMERCIAL

## 2023-08-25 RX ORDER — SEMAGLUTIDE 1 MG/.5ML
1 INJECTION, SOLUTION SUBCUTANEOUS WEEKLY
Qty: 3 ML | Refills: 1 | Status: SHIPPED | OUTPATIENT
Start: 2023-08-25

## 2023-08-25 NOTE — TELEPHONE ENCOUNTER
From: Yousuf Em  To: Angela Juliethya Smith  Sent: 8/24/2023 12:23 AM EDT  Subject: Wegovy refill     I recently got my Wegovy .5 refill, after an extended wait. I was wanting to increase it to .75 dosage. We had discussed it on our last visit.

## 2023-10-09 RX ORDER — SEMAGLUTIDE 1 MG/.5ML
INJECTION, SOLUTION SUBCUTANEOUS
Qty: 4 ML | Refills: 0 | Status: SHIPPED | OUTPATIENT
Start: 2023-10-09

## 2023-10-17 ENCOUNTER — OFFICE VISIT (OUTPATIENT)
Dept: CARDIOLOGY | Facility: CLINIC | Age: 53
End: 2023-10-17
Payer: COMMERCIAL

## 2023-10-17 VITALS
HEART RATE: 75 BPM | WEIGHT: 256 LBS | HEIGHT: 69 IN | BODY MASS INDEX: 37.92 KG/M2 | SYSTOLIC BLOOD PRESSURE: 145 MMHG | DIASTOLIC BLOOD PRESSURE: 94 MMHG

## 2023-10-17 DIAGNOSIS — E78.5 HYPERLIPIDEMIA LDL GOAL <70: ICD-10-CM

## 2023-10-17 DIAGNOSIS — I25.10 CORONARY ARTERY DISEASE INVOLVING NATIVE CORONARY ARTERY OF NATIVE HEART WITHOUT ANGINA PECTORIS: Primary | ICD-10-CM

## 2023-10-17 DIAGNOSIS — I10 ESSENTIAL HYPERTENSION: ICD-10-CM

## 2023-10-17 NOTE — PROGRESS NOTES
Chief Complaint  Coronary artery disease involving native coronary artery of    Subjective            Yousuf Em presents to [facility] CARDIOLOGY  History of Present Illness    Yousuf is here for follow-up evaluation management of coronary artery disease with previous PCI to the circumflex May 2022, essential hypertension, mixed hyperlipidemia.  Since his last office visit he had 1 episode of jaw pain lasting about 20 minutes while laying in bed.  No other significant angina.  He denies shortness of breath or palpitations.  He checked his blood pressure last week and it was 130/90.  Generally it is well controlled.  He is compliant with his medical therapy.    PMH  Past Medical History:   Diagnosis Date    ADHD (attention deficit hyperactivity disorder) 2008    Allergic 2000    AR (allergic rhinitis)     Arthritis     Colitis     Colon polyp 2005    Depression     ETD (eustachian tube dysfunction)     Forgetfulness     Hearing loss     High cholesterol     Hyperlipemia     Hypertension     Hypothyroidism 2005    Inflammatory bowel disease 2005    Labral tear of long head of biceps tendon, initial encounter 09/30/2015    Labral tear of shoulder, left, sequela 09/25/2015    Obesity 2010    Right lateral epicondylitis 10/21/2016    Seasonal allergies     Thyroid disorder          SURGICALHX  Past Surgical History:   Procedure Laterality Date    CARDIAC CATHETERIZATION N/A 5/5/2022    Procedure: Left Heart Cath;  Surgeon: Samy Kaur MD;  Location: [facility] CATH INVASIVE LOCATION;  Service: Cardiovascular;  Laterality: N/A;    CARDIAC CATHETERIZATION N/A 5/5/2022    Procedure: Percutaneous Coronary Intervention;  Surgeon: Samy Kaur MD;  Location: [facility] CATH INVASIVE LOCATION;  Service: Cardiovascular;  Laterality: N/A;    CHOLECYSTECTOMY  2000    COLONOSCOPY      EAR TUBES      GALLBLADDER SURGERY      OTHER SURGICAL HISTORY      JOINT SURGERY        SOC  Social History      Socioeconomic History    Marital status: Single   Tobacco Use    Smoking status: Never    Smokeless tobacco: Never   Vaping Use    Vaping Use: Never used   Substance and Sexual Activity    Alcohol use: Never    Drug use: Never    Sexual activity: Yes     Partners: Female     Birth control/protection: None         FAMHX  Family History   Problem Relation Age of Onset    Arthritis Mother     Cancer Mother     Heart disease Mother     Diabetes Mother     Heart disease Father     Heart disease Sister     Stroke Maternal Grandmother           ALLERGY  No Known Allergies     MEDSCURRENT    Current Outpatient Medications:     aspirin 81 MG chewable tablet, Chew 1 tablet Daily., Disp: , Rfl:     atorvastatin (LIPITOR) 80 MG tablet, Take 1 tablet by mouth Daily., Disp: 90 tablet, Rfl: 3    cetirizine (zyrTEC) 10 MG tablet, Take 1 tablet by mouth Daily., Disp: , Rfl:     desvenlafaxine (PRISTIQ) 100 MG 24 hr tablet, Take 1 tablet by mouth Daily., Disp: 90 tablet, Rfl: 1    levothyroxine (SYNTHROID, LEVOTHROID) 75 MCG tablet, Take 1 tablet by mouth Daily for 90 days., Disp: 90 tablet, Rfl: 0    losartan (COZAAR) 100 MG tablet, Take 1 tablet by mouth Daily., Disp: 90 tablet, Rfl: 1    multivitamin with minerals tablet tablet, Take 1 tablet by mouth Daily., Disp: , Rfl:     nitroglycerin (NITROSTAT) 0.4 MG SL tablet, Place 1 tablet under the tongue Every 5 (Five) Minutes As Needed for Chest Pain (Do not take more than 3 at one time. Go to ER or call 911 if chest pain persists). Take no more than 3 doses in 15 minutes., Disp: 30 tablet, Rfl: 1    ranolazine (Ranexa) 500 MG 12 hr tablet, Take 1 tablet by mouth 2 (Two) Times a Day., Disp: 180 tablet, Rfl: 3    Wegovy 1 MG/0.5ML solution auto-injector, INJECT 1 MG SUBCUTANEOUSLY ONCE A WEEK, Disp: 4 mL, Rfl: 0    olopatadine (Pataday) 0.2 % solution ophthalmic solution, Administer 1 drop to both eyes Daily., Disp: 2.5 mL, Rfl: 1    Omega-3 Fatty Acids (fish oil) 1000 MG capsule  "capsule, Take  by mouth Daily With Breakfast., Disp: , Rfl:       Review of Systems   Constitutional: Negative for malaise/fatigue.   Cardiovascular:  Negative for chest pain, dyspnea on exertion, irregular heartbeat and palpitations.   Respiratory:  Negative for shortness of breath.    Hematologic/Lymphatic: Negative for bleeding problem.   Neurological:  Negative for dizziness.        Objective     /94   Pulse 75   Ht 175.3 cm (69.02\")   Wt 116 kg (256 lb)   BMI 37.79 kg/m²       General Appearance:   well developed  well nourished  HENT:   oropharynx moist  lips not cyanotic  Neck:  thyroid not enlarged  supple  Respiratory:  no respiratory distress  normal breath sounds  no rales  Cardiovascular:  no jugular venous distention  regular rhythm  apical impulse normal  S1 normal, S2 normal  no S3, no S4   no murmur  no rub, no thrill  carotid pulses normal; no bruit  pedal pulses normal  lower extremity edema: none    Musculoskeletal:  no clubbing of fingers.   normocephalic, head atraumatic  Skin:   warm, dry  Psychiatric:  judgement and insight appropriate  normal mood and affect      Result Review :     The following data was reviewed by: AKI Ferguson on 10/17/2023:    CMP          11/16/2022    09:52 3/20/2023    09:24   CMP   Glucose 97  102    BUN 15  13    Creatinine 0.98  1.01    EGFR 92.8  89.5    Sodium 138  139    Potassium 3.1  4.0    Chloride 103  104    Calcium 9.4  9.6    Total Protein 6.8  7.6    Albumin 4.10  4.2    Globulin 2.7  3.4    Total Bilirubin 0.5  0.4    Alkaline Phosphatase 89  110    AST (SGOT) 17  22    ALT (SGPT) 21  26    Albumin/Globulin Ratio 1.5  1.2    BUN/Creatinine Ratio 15.3  12.9    Anion Gap 13.5  11.0      CBC          11/16/2022    09:52 3/20/2023    09:24   CBC   WBC 8.48  7.81    RBC 4.48  4.47    Hemoglobin 13.7  13.8    Hematocrit 40.6  40.1    MCV 90.6  89.7    MCH 30.6  30.9    MCHC 33.7  34.4    RDW 13.2  13.4    Platelets 343  346      Lipid " Panel          11/16/2022    09:52 3/20/2023    09:24   Lipid Panel   Total Cholesterol 150  132    Triglycerides 159  154    HDL Cholesterol 37  31    VLDL Cholesterol 28  27    LDL Cholesterol  85  74    LDL/HDL Ratio 2.19  2.26      TSH          11/16/2022    09:52 3/20/2023    09:24   TSH   TSH 1.840  1.640        Data reviewed : Cardiology studies procedure notes reviewed.  Previous echo showed ejection fraction 56 to 60%, no significant valvular disease.    Procedures      Yousuf Em  reports that he has never smoked. He has never used smokeless tobacco.. I have educated him on the risk of diseases from using tobacco products such as cancer, COPD, and heart disease.                   Assessment and Plan        ASSESSMENT:  Encounter Diagnoses   Name Primary?    Coronary artery disease involving native coronary artery of native heart without angina pectoris Yes    Essential hypertension     Hyperlipidemia LDL goal <70          PLAN:    1.  Coronary artery disease-not had 1 episode of jaw pain which was nonexertional.  He was previously on long-acting nitroglycerin however caused significant headaches.  He is now taking Ranexa.  No significant breakthrough angina.  He does walk and run for exercise, no symptoms during exertion.  It has been well over a year since his PCI, will discontinue Brilinta and continue aspirin monotherapy.  2.  Essential hypertension-typically well controlled.  Slightly elevated today.  He reports some high stress with his job today.  He is going to monitor at home and notify me if consistently greater than 140/90.  He was previously on a beta-blocker and did well with that.  If we need an additional agent will consider beta-blocker at that point.  3.  Mixed hyperlipidemia-stable on statin therapy.    Follow-up in 6 months unless problems arise.      Patient was given instructions and counseling regarding his condition or for health maintenance advice. Please see specific information  pulled into the AVS if appropriate.           Di Lama, APRN   10/17/2023  13:23 EDT

## 2023-11-06 ENCOUNTER — DOCUMENTATION (OUTPATIENT)
Dept: CARDIOLOGY | Facility: CLINIC | Age: 53
End: 2023-11-06
Payer: COMMERCIAL

## 2023-11-06 RX ORDER — CARVEDILOL 3.12 MG/1
3.12 TABLET ORAL 2 TIMES DAILY
Qty: 180 TABLET | Refills: 3 | Status: SHIPPED | OUTPATIENT
Start: 2023-11-06

## 2023-11-13 RX ORDER — DESVENLAFAXINE 100 MG/1
TABLET, EXTENDED RELEASE ORAL DAILY
Qty: 90 TABLET | Refills: 0 | Status: SHIPPED | OUTPATIENT
Start: 2023-11-13

## 2023-11-13 RX ORDER — SEMAGLUTIDE 1 MG/.5ML
INJECTION, SOLUTION SUBCUTANEOUS
Qty: 4 ML | Refills: 0 | Status: SHIPPED | OUTPATIENT
Start: 2023-11-13

## 2023-12-05 ENCOUNTER — DOCUMENTATION (OUTPATIENT)
Dept: CARDIOLOGY | Facility: CLINIC | Age: 53
End: 2023-12-05
Payer: COMMERCIAL

## 2023-12-05 RX ORDER — CARVEDILOL 6.25 MG/1
6.25 TABLET ORAL 2 TIMES DAILY
Qty: 180 TABLET | Refills: 3 | Status: SHIPPED | OUTPATIENT
Start: 2023-12-05

## 2023-12-11 RX ORDER — LEVOTHYROXINE SODIUM 0.07 MG/1
75 TABLET ORAL DAILY
Qty: 90 TABLET | Refills: 0 | Status: SHIPPED | OUTPATIENT
Start: 2023-12-11 | End: 2024-03-10

## 2023-12-11 RX ORDER — LOSARTAN POTASSIUM 100 MG/1
100 TABLET ORAL DAILY
Qty: 90 TABLET | Refills: 0 | Status: SHIPPED | OUTPATIENT
Start: 2023-12-11

## 2023-12-13 RX ORDER — SEMAGLUTIDE 1 MG/.5ML
INJECTION, SOLUTION SUBCUTANEOUS
Qty: 4 ML | Refills: 0 | Status: SHIPPED | OUTPATIENT
Start: 2023-12-13

## 2023-12-27 ENCOUNTER — OFFICE VISIT (OUTPATIENT)
Dept: INTERNAL MEDICINE | Facility: CLINIC | Age: 53
End: 2023-12-27
Payer: COMMERCIAL

## 2023-12-27 VITALS
OXYGEN SATURATION: 95 % | WEIGHT: 257 LBS | SYSTOLIC BLOOD PRESSURE: 128 MMHG | HEART RATE: 77 BPM | DIASTOLIC BLOOD PRESSURE: 88 MMHG | HEIGHT: 69 IN | BODY MASS INDEX: 38.06 KG/M2 | TEMPERATURE: 97.4 F

## 2023-12-27 DIAGNOSIS — E78.2 MIXED HYPERLIPIDEMIA: ICD-10-CM

## 2023-12-27 DIAGNOSIS — F41.9 ANXIETY: ICD-10-CM

## 2023-12-27 DIAGNOSIS — E03.8 HYPOTHYROIDISM DUE TO HASHIMOTO'S THYROIDITIS: ICD-10-CM

## 2023-12-27 DIAGNOSIS — I10 PRIMARY HYPERTENSION: ICD-10-CM

## 2023-12-27 DIAGNOSIS — R07.9 CHEST PAIN, UNSPECIFIED TYPE: Primary | ICD-10-CM

## 2023-12-27 DIAGNOSIS — M25.50 ARTHRALGIA, UNSPECIFIED JOINT: ICD-10-CM

## 2023-12-27 DIAGNOSIS — E06.3 HYPOTHYROIDISM DUE TO HASHIMOTO'S THYROIDITIS: ICD-10-CM

## 2023-12-27 LAB
ALBUMIN SERPL-MCNC: 4.4 G/DL (ref 3.5–5.2)
ALBUMIN/GLOB SERPL: 1.5 G/DL
ALP SERPL-CCNC: 96 U/L (ref 39–117)
ALT SERPL W P-5'-P-CCNC: 24 U/L (ref 1–41)
ANION GAP SERPL CALCULATED.3IONS-SCNC: 9.5 MMOL/L (ref 5–15)
AST SERPL-CCNC: 16 U/L (ref 1–40)
BASOPHILS # BLD AUTO: 0.05 10*3/MM3 (ref 0–0.2)
BASOPHILS NFR BLD AUTO: 0.7 % (ref 0–1.5)
BILIRUB SERPL-MCNC: 0.5 MG/DL (ref 0–1.2)
BUN SERPL-MCNC: 14 MG/DL (ref 6–20)
BUN/CREAT SERPL: 11.2 (ref 7–25)
CALCIUM SPEC-SCNC: 9.4 MG/DL (ref 8.6–10.5)
CHLORIDE SERPL-SCNC: 106 MMOL/L (ref 98–107)
CHOLEST SERPL-MCNC: 147 MG/DL (ref 0–200)
CO2 SERPL-SCNC: 22.5 MMOL/L (ref 22–29)
CREAT SERPL-MCNC: 1.25 MG/DL (ref 0.76–1.27)
DEPRECATED RDW RBC AUTO: 41.1 FL (ref 37–54)
EGFRCR SERPLBLD CKD-EPI 2021: 68.9 ML/MIN/1.73
EOSINOPHIL # BLD AUTO: 0.2 10*3/MM3 (ref 0–0.4)
EOSINOPHIL NFR BLD AUTO: 2.8 % (ref 0.3–6.2)
ERYTHROCYTE [DISTWIDTH] IN BLOOD BY AUTOMATED COUNT: 12.6 % (ref 12.3–15.4)
GLOBULIN UR ELPH-MCNC: 3 GM/DL
GLUCOSE SERPL-MCNC: 101 MG/DL (ref 65–99)
HBA1C MFR BLD: 5.5 % (ref 4.8–5.6)
HCT VFR BLD AUTO: 41.8 % (ref 37.5–51)
HCV AB SER DONR QL: NORMAL
HDLC SERPL-MCNC: 37 MG/DL (ref 40–60)
HGB BLD-MCNC: 13.9 G/DL (ref 13–17.7)
IMM GRANULOCYTES # BLD AUTO: 0.02 10*3/MM3 (ref 0–0.05)
IMM GRANULOCYTES NFR BLD AUTO: 0.3 % (ref 0–0.5)
LDLC SERPL CALC-MCNC: 85 MG/DL (ref 0–100)
LDLC/HDLC SERPL: 2.21 {RATIO}
LYMPHOCYTES # BLD AUTO: 1.94 10*3/MM3 (ref 0.7–3.1)
LYMPHOCYTES NFR BLD AUTO: 27.2 % (ref 19.6–45.3)
MCH RBC QN AUTO: 29.8 PG (ref 26.6–33)
MCHC RBC AUTO-ENTMCNC: 33.3 G/DL (ref 31.5–35.7)
MCV RBC AUTO: 89.7 FL (ref 79–97)
MONOCYTES # BLD AUTO: 0.47 10*3/MM3 (ref 0.1–0.9)
MONOCYTES NFR BLD AUTO: 6.6 % (ref 5–12)
NEUTROPHILS NFR BLD AUTO: 4.46 10*3/MM3 (ref 1.7–7)
NEUTROPHILS NFR BLD AUTO: 62.4 % (ref 42.7–76)
NRBC BLD AUTO-RTO: 0 /100 WBC (ref 0–0.2)
PLATELET # BLD AUTO: 341 10*3/MM3 (ref 140–450)
PMV BLD AUTO: 9.6 FL (ref 6–12)
POTASSIUM SERPL-SCNC: 4.2 MMOL/L (ref 3.5–5.2)
PROT SERPL-MCNC: 7.4 G/DL (ref 6–8.5)
RBC # BLD AUTO: 4.66 10*6/MM3 (ref 4.14–5.8)
SODIUM SERPL-SCNC: 138 MMOL/L (ref 136–145)
T4 FREE SERPL-MCNC: 1.23 NG/DL (ref 0.93–1.7)
TRIGL SERPL-MCNC: 141 MG/DL (ref 0–150)
TSH SERPL DL<=0.05 MIU/L-ACNC: 2.82 UIU/ML (ref 0.27–4.2)
VLDLC SERPL-MCNC: 25 MG/DL (ref 5–40)
WBC NRBC COR # BLD AUTO: 7.14 10*3/MM3 (ref 3.4–10.8)

## 2023-12-27 PROCEDURE — 80053 COMPREHEN METABOLIC PANEL: CPT | Performed by: INTERNAL MEDICINE

## 2023-12-27 PROCEDURE — 84439 ASSAY OF FREE THYROXINE: CPT | Performed by: INTERNAL MEDICINE

## 2023-12-27 PROCEDURE — 86038 ANTINUCLEAR ANTIBODIES: CPT | Performed by: INTERNAL MEDICINE

## 2023-12-27 PROCEDURE — 80061 LIPID PANEL: CPT | Performed by: INTERNAL MEDICINE

## 2023-12-27 PROCEDURE — 83036 HEMOGLOBIN GLYCOSYLATED A1C: CPT | Performed by: INTERNAL MEDICINE

## 2023-12-27 PROCEDURE — 85025 COMPLETE CBC W/AUTO DIFF WBC: CPT | Performed by: INTERNAL MEDICINE

## 2023-12-27 PROCEDURE — 84443 ASSAY THYROID STIM HORMONE: CPT | Performed by: INTERNAL MEDICINE

## 2023-12-27 PROCEDURE — 86803 HEPATITIS C AB TEST: CPT | Performed by: INTERNAL MEDICINE

## 2023-12-27 RX ORDER — AMLODIPINE BESYLATE 5 MG/1
5 TABLET ORAL DAILY
Qty: 90 TABLET | Refills: 1 | Status: SHIPPED | OUTPATIENT
Start: 2023-12-27

## 2023-12-27 NOTE — PROGRESS NOTES
"Chief Complaint  Hypothyroidism and Hypertension (Pt states that his BP still has been running high since Di Schmitzst PRABHAKAR has increase Carvedilol 6.25 mg BID since the 12/5/23 was told to start monitor BP after 2 weeks of taking medication. She wanting his BP to run at 120/90./Pt states that his BP running 140/96)    Subjective      History of Present Illness  Yousuf Em is a 53 y.o. male who presents to [facility] with a past medical history of  Past Medical History:   Diagnosis Date    ADHD (attention deficit hyperactivity disorder) 2008    Allergic 2000    AR (allergic rhinitis)     Arthritis     Colitis     Colon polyp 2005    Depression     ETD (eustachian tube dysfunction)     Forgetfulness     Hearing loss     High cholesterol     Hyperlipemia     Hypertension     Hypothyroidism 2005    Inflammatory bowel disease 2005    Labral tear of long head of biceps tendon, initial encounter 09/30/2015    Labral tear of shoulder, left, sequela 09/25/2015    Obesity 2010    Right lateral epicondylitis 10/21/2016    Seasonal allergies     Thyroid disorder      States that bp was running on the high side  At home the bp has been around 140/90  This is even after starting the carvedilol    He is still having some chest pain  States this has been there for quite some time it does get worse with movement and activity at times with some nausea    Pristiq is working quite well for him  No trouble breathing    He does feel like the Wegovy helps him however thinks he may be hitting some plateaus with this    Objective   Vital Signs:   Vitals:    12/27/23 0847   BP: 128/88   Pulse: 77   Temp: 97.4 °F (36.3 °C)   TempSrc: Temporal   SpO2: 95%   Weight: 117 kg (257 lb)   Height: 175.3 cm (69\")   PainSc: 0-No pain     Body mass index is 37.95 kg/m².    Wt Readings from Last 3 Encounters:   12/27/23 117 kg (257 lb)   10/17/23 116 kg (256 lb)   06/26/23 118 kg (260 lb 12.8 " oz)     BP Readings from Last 3 Encounters:   12/27/23 128/88   10/17/23 145/94   06/26/23 132/84       Health Maintenance   Topic Date Due    HEPATITIS C SCREENING  Never done    COVID-19 Vaccine (3 - 2023-24 season) 12/27/2024 (Originally 9/1/2023)    LIPID PANEL  03/20/2024    ANNUAL PHYSICAL  06/26/2024    BMI FOLLOWUP  12/13/2024    TDAP/TD VACCINES (2 - Td or Tdap) 08/03/2030    COLORECTAL CANCER SCREENING  08/10/2031    INFLUENZA VACCINE  Completed    ZOSTER VACCINE  Completed    Pneumococcal Vaccine 0-64  Aged Out       Physical Exam  Vitals reviewed.   Constitutional:       Appearance: Normal appearance. He is well-developed. He is obese.   HENT:      Head: Normocephalic and atraumatic.      Right Ear: External ear normal.      Left Ear: External ear normal.   Eyes:      Conjunctiva/sclera: Conjunctivae normal.      Pupils: Pupils are equal, round, and reactive to light.   Cardiovascular:      Rate and Rhythm: Normal rate and regular rhythm.      Heart sounds: No murmur heard.     No friction rub. No gallop.   Pulmonary:      Effort: Pulmonary effort is normal.      Breath sounds: Normal breath sounds. No wheezing or rhonchi.   Skin:     General: Skin is warm and dry.   Neurological:      Mental Status: He is alert and oriented to person, place, and time.   Psychiatric:         Mood and Affect: Affect normal.         Behavior: Behavior normal.         Thought Content: Thought content normal.            Result Review :  The following data was reviewed by: Angela Smith MD on 12/27/2023:      Procedures        Assessment and Plan   Diagnoses and all orders for this visit:    1. Chest pain, unspecified type (Primary)  Comments:  Continue work with cardiology  Orders:  -     Hepatitis C antibody; Future  -     Comprehensive Metabolic Panel  -     CBC & Differential  -     TSH  -     Lipid Panel  -     Hemoglobin A1c; Future    2. Hypothyroidism due to Hashimoto's thyroiditis  Comments:  Will check labs and  adjust as needed  Orders:  -     TSH  -     T4, Free    3. Anxiety  Comments:  Doing well on Pristiq  Overview:  doing well on current meds      4. Primary hypertension  Comments:  Will add amlodipine warned to monitor for dizziness lightheadedness and leg swelling  Orders:  -     Hepatitis C antibody; Future  -     Comprehensive Metabolic Panel  -     CBC & Differential  -     TSH  -     Lipid Panel  -     Hemoglobin A1c; Future    5. Mixed hyperlipidemia  Comments:  Will check labs and adjust as needed  Orders:  -     Hepatitis C antibody; Future  -     Comprehensive Metabolic Panel  -     CBC & Differential  -     TSH  -     Lipid Panel  -     Hemoglobin A1c; Future    6. Arthralgia, unspecified joint  Comments:  Will check MARLENE however likely osteoarthritis discussed treatment for that  Orders:  -     MARLENE by IFA, Reflex 9-biomarkers profile; Future    Other orders  -     amLODIPine (NORVASC) 5 MG tablet; Take 1 tablet by mouth Daily.  Dispense: 90 tablet; Refill: 1  -     Semaglutide-Weight Management 1.7 MG/0.75ML solution auto-injector; Inject 0.75 mL under the skin into the appropriate area as directed 1 (One) Time Per Week.  Dispense: 6 mL; Refill: 1                  FOLLOW UP  Return in about 3 months (around 3/27/2024).  Patient was given instructions and counseling regarding his condition or for health maintenance advice. Please see specific information pulled into the AVS if appropriate.       Angela Smith MD  12/27/23  09:33 EST    CURRENT & DISCONTINUED MEDICATIONS  Current Outpatient Medications   Medication Instructions    amLODIPine (NORVASC) 5 mg, Oral, Daily    aspirin 81 mg, Oral, Daily    atorvastatin (LIPITOR) 80 mg, Oral, Daily    carvedilol (COREG) 6.25 mg, Oral, 2 Times Daily    cetirizine (ZYRTEC) 10 mg, Oral, Daily    Cyanocobalamin ([employer] B12 GUMMIES PO) 2,000 Units, Oral, 2 Times Daily, otc    desvenlafaxine (PRISTIQ) 100 MG 24 hr tablet Oral, Daily    levothyroxine (SYNTHROID,  LEVOTHROID) 75 mcg, Oral, Daily    losartan (COZAAR) 100 mg, Oral, Daily    nitroglycerin (NITROSTAT) 0.4 mg, Sublingual, Every 5 Minutes PRN, Take no more than 3 doses in 15 minutes.    ranolazine (RANEXA) 500 mg, Oral, 2 Times Daily    Semaglutide-Weight Management 1.7 mg, Subcutaneous, Weekly       Medications Discontinued During This Encounter   Medication Reason    multivitamin with minerals tablet tablet *Therapy completed    olopatadine (Pataday) 0.2 % solution ophthalmic solution *Therapy completed    Omega-3 Fatty Acids (fish oil) 1000 MG capsule capsule *Therapy completed    Wegovy 1 MG/0.5ML solution auto-injector

## 2023-12-28 LAB
ANA SER QL IF: NEGATIVE
LABORATORY COMMENT REPORT: NORMAL

## 2024-01-09 ENCOUNTER — APPOINTMENT (OUTPATIENT)
Dept: MRI IMAGING | Facility: HOSPITAL | Age: 54
End: 2024-01-09
Payer: COMMERCIAL

## 2024-01-09 ENCOUNTER — APPOINTMENT (OUTPATIENT)
Dept: CT IMAGING | Facility: HOSPITAL | Age: 54
End: 2024-01-09
Payer: COMMERCIAL

## 2024-01-09 ENCOUNTER — APPOINTMENT (OUTPATIENT)
Dept: GENERAL RADIOLOGY | Facility: HOSPITAL | Age: 54
End: 2024-01-09
Payer: COMMERCIAL

## 2024-01-09 ENCOUNTER — HOSPITAL ENCOUNTER (EMERGENCY)
Facility: HOSPITAL | Age: 54
Discharge: HOME | End: 2024-01-10
Attending: EMERGENCY MEDICINE | Admitting: EMERGENCY MEDICINE
Payer: COMMERCIAL

## 2024-01-09 DIAGNOSIS — R53.1 GENERALIZED WEAKNESS: Primary | ICD-10-CM

## 2024-01-09 DIAGNOSIS — R20.0 NUMBNESS OF TONGUE: ICD-10-CM

## 2024-01-09 LAB
ALBUMIN SERPL-MCNC: 4.3 G/DL (ref 3.5–5.2)
ALBUMIN/GLOB SERPL: 1.3 G/DL
ALP SERPL-CCNC: 103 U/L (ref 39–117)
ALT SERPL W P-5'-P-CCNC: 30 U/L (ref 1–41)
ANION GAP SERPL CALCULATED.3IONS-SCNC: 11.4 MMOL/L (ref 5–15)
APTT PPP: 29.9 SECONDS (ref 24.2–34.2)
AST SERPL-CCNC: 24 U/L (ref 1–40)
BASOPHILS # BLD AUTO: 0.04 10*3/MM3 (ref 0–0.2)
BASOPHILS NFR BLD AUTO: 0.4 % (ref 0–1.5)
BILIRUB SERPL-MCNC: 0.4 MG/DL (ref 0–1.2)
BILIRUB UR QL STRIP: NEGATIVE
BUN SERPL-MCNC: 12 MG/DL (ref 6–20)
BUN/CREAT SERPL: 9.6 (ref 7–25)
CALCIUM SPEC-SCNC: 9.2 MG/DL (ref 8.6–10.5)
CHLORIDE SERPL-SCNC: 103 MMOL/L (ref 98–107)
CLARITY UR: CLEAR
CO2 SERPL-SCNC: 24.6 MMOL/L (ref 22–29)
COLOR UR: YELLOW
CREAT SERPL-MCNC: 1.25 MG/DL (ref 0.76–1.27)
DEPRECATED RDW RBC AUTO: 40.2 FL (ref 37–54)
EGFRCR SERPLBLD CKD-EPI 2021: 68.9 ML/MIN/1.73
EOSINOPHIL # BLD AUTO: 0.22 10*3/MM3 (ref 0–0.4)
EOSINOPHIL NFR BLD AUTO: 2.4 % (ref 0.3–6.2)
ERYTHROCYTE [DISTWIDTH] IN BLOOD BY AUTOMATED COUNT: 12.3 % (ref 12.3–15.4)
GLOBULIN UR ELPH-MCNC: 3.2 GM/DL
GLUCOSE BLDC GLUCOMTR-MCNC: 149 MG/DL (ref 70–99)
GLUCOSE SERPL-MCNC: 143 MG/DL (ref 65–99)
GLUCOSE UR STRIP-MCNC: NEGATIVE MG/DL
HCT VFR BLD AUTO: 39.4 % (ref 37.5–51)
HGB BLD-MCNC: 13.8 G/DL (ref 13–17.7)
HGB UR QL STRIP.AUTO: NEGATIVE
HOLD SPECIMEN: NORMAL
HOLD SPECIMEN: NORMAL
IMM GRANULOCYTES # BLD AUTO: 0.03 10*3/MM3 (ref 0–0.05)
IMM GRANULOCYTES NFR BLD AUTO: 0.3 % (ref 0–0.5)
INR PPP: 0.93 (ref 0.86–1.15)
KETONES UR QL STRIP: NEGATIVE
LEUKOCYTE ESTERASE UR QL STRIP.AUTO: NEGATIVE
LYMPHOCYTES # BLD AUTO: 2.26 10*3/MM3 (ref 0.7–3.1)
LYMPHOCYTES NFR BLD AUTO: 25 % (ref 19.6–45.3)
MCH RBC QN AUTO: 31.2 PG (ref 26.6–33)
MCHC RBC AUTO-ENTMCNC: 35 G/DL (ref 31.5–35.7)
MCV RBC AUTO: 88.9 FL (ref 79–97)
MONOCYTES # BLD AUTO: 0.44 10*3/MM3 (ref 0.1–0.9)
MONOCYTES NFR BLD AUTO: 4.9 % (ref 5–12)
NEUTROPHILS NFR BLD AUTO: 6.04 10*3/MM3 (ref 1.7–7)
NEUTROPHILS NFR BLD AUTO: 67 % (ref 42.7–76)
NITRITE UR QL STRIP: NEGATIVE
NRBC BLD AUTO-RTO: 0 /100 WBC (ref 0–0.2)
PH UR STRIP.AUTO: 8 [PH] (ref 5–8)
PLATELET # BLD AUTO: 334 10*3/MM3 (ref 140–450)
PMV BLD AUTO: 8.8 FL (ref 6–12)
POTASSIUM SERPL-SCNC: 4.6 MMOL/L (ref 3.5–5.2)
PROT SERPL-MCNC: 7.5 G/DL (ref 6–8.5)
PROT UR QL STRIP: NEGATIVE
PROTHROMBIN TIME: 12.6 SECONDS (ref 11.8–14.9)
RBC # BLD AUTO: 4.43 10*6/MM3 (ref 4.14–5.8)
SODIUM SERPL-SCNC: 139 MMOL/L (ref 136–145)
SP GR UR STRIP: >1.03 (ref 1–1.03)
TROPONIN T SERPL HS-MCNC: <6 NG/L
UROBILINOGEN UR QL STRIP: ABNORMAL
WBC NRBC COR # BLD AUTO: 9.03 10*3/MM3 (ref 3.4–10.8)
WHOLE BLOOD HOLD COAG: NORMAL
WHOLE BLOOD HOLD SPECIMEN: NORMAL

## 2024-01-09 PROCEDURE — 85025 COMPLETE CBC W/AUTO DIFF WBC: CPT | Performed by: EMERGENCY MEDICINE

## 2024-01-09 PROCEDURE — 80053 COMPREHEN METABOLIC PANEL: CPT | Performed by: EMERGENCY MEDICINE

## 2024-01-09 PROCEDURE — 71045 X-RAY EXAM CHEST 1 VIEW: CPT

## 2024-01-09 PROCEDURE — 82948 REAGENT STRIP/BLOOD GLUCOSE: CPT

## 2024-01-09 PROCEDURE — 99204 OFFICE O/P NEW MOD 45 MIN: CPT | Performed by: PSYCHIATRY & NEUROLOGY

## 2024-01-09 PROCEDURE — 70498 CT ANGIOGRAPHY NECK: CPT

## 2024-01-09 PROCEDURE — 81003 URINALYSIS AUTO W/O SCOPE: CPT | Performed by: EMERGENCY MEDICINE

## 2024-01-09 PROCEDURE — 0042T HC CT CEREBRAL PERFUSION W/WO CONTRAST: CPT

## 2024-01-09 PROCEDURE — 70551 MRI BRAIN STEM W/O DYE: CPT

## 2024-01-09 PROCEDURE — 70496 CT ANGIOGRAPHY HEAD: CPT

## 2024-01-09 PROCEDURE — 84484 ASSAY OF TROPONIN QUANT: CPT | Performed by: EMERGENCY MEDICINE

## 2024-01-09 PROCEDURE — 85730 THROMBOPLASTIN TIME PARTIAL: CPT | Performed by: EMERGENCY MEDICINE

## 2024-01-09 PROCEDURE — 25510000001 IOPAMIDOL PER 1 ML: Performed by: EMERGENCY MEDICINE

## 2024-01-09 PROCEDURE — 99285 EMERGENCY DEPT VISIT HI MDM: CPT

## 2024-01-09 PROCEDURE — 85610 PROTHROMBIN TIME: CPT | Performed by: EMERGENCY MEDICINE

## 2024-01-09 PROCEDURE — 93005 ELECTROCARDIOGRAM TRACING: CPT | Performed by: EMERGENCY MEDICINE

## 2024-01-09 PROCEDURE — 70450 CT HEAD/BRAIN W/O DYE: CPT

## 2024-01-09 RX ORDER — SODIUM CHLORIDE 0.9 % (FLUSH) 0.9 %
10 SYRINGE (ML) INJECTION AS NEEDED
Status: DISCONTINUED | OUTPATIENT
Start: 2024-01-09 | End: 2024-01-10 | Stop reason: HOSPADM

## 2024-01-09 RX ADMIN — IOPAMIDOL 150 ML: 755 INJECTION, SOLUTION INTRAVENOUS at 20:29

## 2024-01-10 VITALS
TEMPERATURE: 98.9 F | OXYGEN SATURATION: 100 % | DIASTOLIC BLOOD PRESSURE: 83 MMHG | BODY MASS INDEX: 38.06 KG/M2 | HEART RATE: 73 BPM | HEIGHT: 69 IN | RESPIRATION RATE: 20 BRPM | WEIGHT: 257 LBS | SYSTOLIC BLOOD PRESSURE: 132 MMHG

## 2024-01-10 LAB
QT INTERVAL: 399 MS
QTC INTERVAL: 440 MS

## 2024-01-10 NOTE — CONSULTS
[facility]  [facility]      Patient Name:   Yousuf Em  YOB: 1970  Identification Number:   MRN - [id number]  Date of Service:   01/09/2024 19:55:54    Diagnosis:        R44.8 - Other and unspecified symptoms and signs involving general sensations and perceptions        R53.1 - Weakness      ADDENDUM;  CTA head/neck showed no LVO. No change in recommendations.     Lynette Reyes M.D.    Neurology       Impression:       Pt is a 54 yo male with CAD, HTN, HLP who presents to the ED with headache, generalized weakness and numbness to his tongue and left side. His symptoms started at around 16:30, but pt report that he has had a headache for the past few days. Since his symptoms were non-disabling and given the uncertain time of onset with his headache for the past few days, the risks of iv-thrombolytics was not felt to be worth the potential benefit. CTA head and neck is pending if no LVO, then recommend MRI brain without contrast to rule out stroke. Discussed with DR Cobian, if MRI brain is obtained in the ED and shows no acute stroke, pt can be discharged. Can continue ASA 81 mg daily in the meantime.    Our recommendations are outlined below.    Recommendations:          Neuro Checks        Initiate or continue Aspirin 81 MG daily        Antihypertensives PRN if Blood pressure is greater than 220/120 or there is a concern for End organ damage/contraindications for permissive HTN. If blood pressure is greater than 220/120 give labetalol PO or IV or Vasotec IV with a goal of 15% reduction in BP during the first 24 hours.    Recommended Scan:       MRI Head Without Contrast   (CT head negative stroke still suspected)    Lipid Panel to Be Obtained, if Not Done in the Last 30 Days    Dysphagia:        Swallow Evaluation, Bedside        NPO Until Swallow Evaluation    DVT prophylaxis:        Choice of Primary Team    Disposition:        Neurology Follow Up  Recommended    Sign Out:        Discussed with Emergency Department Provider        ------------------------------------------------------------------------------    Advanced Imaging:  Advanced imaging has been ordered. Results pending.      Metrics:  Last Known Well: 01/09/2024 16:00:00  [facility] Notification Time: 01/09/2024 19:55:04  Arrival Time: 01/09/2024 19:34:00  Stamp Time: 01/09/2024 19:55:54  Initial Response Time: 01/09/2024 18:58:54  Symptoms: Generalized weakness, numbness to the left side of his tongue .  Initial patient interaction: 01/09/2024 18:08:41  NIHSS Assessment Completed: 01/09/2024 20:12:40  Patient is not a candidate for Thrombolytic.  Thrombolytic Medical Decision: 01/09/2024 20:13:55  Patient was not deemed candidate for Thrombolytic because of following reasons:  No disabling symptoms.    I personally Reviewed the CT Head and it showed no acute ischemic changes, no ICH.    Primary Provider Notified of Diagnostic Impression and Management Plan on: 01/09/2024 20:34:04        ------------------------------------------------------------------------------    History of Present Illness:  Patient is a 53 year old Male.    Patient was brought by private transportation with symptoms of Generalized weakness, numbness to the left side of his tongue .  Pt is a 52 yo male with CAD, HTN, HLP who presents to the ED with headache, generalized weakness and numbness to his tongue and left side. Pt reports that he's had a headache for the past couple of days and he does not usually get headaches. Today, he was at at store, when he suddenly felt like he was hypoglycemic with generalized weakness and numbness. HE then noted numbness on the tip of his tongue. HE ate a candy bar to see if it would help his symptoms, but when they did not improve he came to the ED. When he was walking in the store, he felt like his left leg would give out, but he had no difficulty walking in triage.       Past Medical  History:       Hypertension       Hyperlipidemia       Coronary Artery Disease       There is no history of Diabetes Mellitus       There is no history of Atrial Fibrillation       There is no history of Stroke    Medications:    No Anticoagulant use   Antiplatelet use: Yes ASA 81 mg daily  Reviewed EMR for current medications    Allergies:   NKDA    Social History:  Smoking: No  Alcohol Use: No    Family History:    There Is Family History Of:Father, mother -CAD   Grandmother-CVA  There is no family history of premature cerebrovascular disease pertinent to this consultation    ROS :  14 Points Review of Systems was performed and was negative except mentioned in HPI.    Past Surgical History:  There Is No Surgical History Contributory To Today’s Visit         Examination:  BP(138//93), Pulse(79), Blood Glucose(149)  1A: Level of Consciousness - Alert; keenly responsive + 0  1B: Ask Month and Age - Both Questions Right + 0  1C: Blink Eyes & Squeeze Hands - Performs Both Tasks + 0  2: Test Horizontal Extraocular Movements - Normal + 0  3: Test Visual Fields - No Visual Loss + 0  4: Test Facial Palsy (Use Grimace if Obtunded) - Normal symmetry + 0  5A: Test Left Arm Motor Drift - No Drift for 10 Seconds + 0  5B: Test Right Arm Motor Drift - No Drift for 10 Seconds + 0  6A: Test Left Leg Motor Drift - No Drift for 5 Seconds + 0  6B: Test Right Leg Motor Drift - No Drift for 5 Seconds + 0  7: Test Limb Ataxia (FNF/Heel-Shin) - No Ataxia + 0  8: Test Sensation - Mild-Moderate Loss: Less Sharp/More Dull + 1  9: Test Language/Aphasia - Normal; No aphasia + 0  10: Test Dysarthria - Normal + 0  11: Test Extinction/Inattention - No abnormality + 0    NIHSS Score: 1  NIHSS Free Text : NIHSS 1 for left face, arm nd leg numbness. HE was able to walk unassisted without any difficulty .    Pre-Morbid Modified [place] Scale:  0 Points = No symptoms at all    Spoke with : DR. Jerry Cobian  I reviewed the available imaging via [facility]  and initiated discussion with the primary provider    Patient/Family was informed the Neurology Consult would occur via TeleHealth consult by way of interactive audio and video telecommunications and consented to receiving care in this manner.      Patient is being evaluated for possible acute neurologic impairment and high probability of imminent or life-threatening deterioration. I spent total of 35 minutes providing care to this patient, including time for face to face visit via telemedicine, review of medical records, imaging studies and discussion of findings with providers, the patient and/or family.      Dr Lynette Reyes      [facility]  For Inpatient follow-up with [facility] physician please call [facility] [phone]. This is not an outpatient service. Post hospital discharge, please contact hospital directly.    Please do not communicate with [facility] physicians via secure chat. If you have any questions, Please contact [facility].

## 2024-01-10 NOTE — ED PROVIDER NOTES
Time: 7:51 PM EST  Date of encounter:  1/9/2024  Independent Historian/Clinical History and Information was obtained by:   Patient and Nursing Staff    History is limited by: N/A    Chief Complaint   Patient presents with    Headache    Numbness         History of Present Illness:  Patient is a 53 y.o. year old male who presents to the emergency department for evaluation of headache for 3 days, reports an episode of generalized weakness and near syncope today while he was shopping, reports generalized weakness in bilateral lower extremities, nausea, numbness in his tongue.  Patient reports his nausea has improved, however his tongue numbness is still there.    He denies any chest pain or recent illness or fevers or new medication.    He thought maybe his blood sugar was low so he ate a couple candy bars but symptoms did not improve so he presents to the ED.    Patient Care Team  Primary Care Provider: Angela Smith MD    Past Medical History:     No Known Allergies  Past Medical History:   Diagnosis Date    ADHD (attention deficit hyperactivity disorder) 2008    Allergic 2000    AR (allergic rhinitis)     Arthritis     Colitis     Colon polyp 2005    Depression     ETD (eustachian tube dysfunction)     Forgetfulness     Hearing loss     High cholesterol     Hyperlipemia     Hypertension     Hypothyroidism 2005    Inflammatory bowel disease 2005    Labral tear of long head of biceps tendon, initial encounter 09/30/2015    Labral tear of shoulder, left, sequela 09/25/2015    Obesity 2010    Right lateral epicondylitis 10/21/2016    Seasonal allergies     Thyroid disorder      Past Surgical History:   Procedure Laterality Date    CARDIAC CATHETERIZATION N/A 5/5/2022    Procedure: Left Heart Cath;  Surgeon: Samy Kaur MD;  Location: [facility] INVASIVE LOCATION;  Service: Cardiovascular;  Laterality: N/A;    CARDIAC CATHETERIZATION N/A 5/5/2022    Procedure: Percutaneous Coronary Intervention;  Surgeon:  Samy Kaur MD;  Location: [facility] INVASIVE LOCATION;  Service: Cardiovascular;  Laterality: N/A;    CHOLECYSTECTOMY  2000    COLONOSCOPY      EAR TUBES      GALLBLADDER SURGERY      OTHER SURGICAL HISTORY      JOINT SURGERY     Family History   Problem Relation Age of Onset    Arthritis Mother     Cancer Mother     Heart disease Mother     Diabetes Mother     Heart disease Father     Heart disease Sister     Stroke Maternal Grandmother        Home Medications:  Prior to Admission medications    Medication Sig Start Date End Date Taking? Authorizing Provider   amLODIPine (NORVASC) 5 MG tablet Take 1 tablet by mouth Daily. 12/27/23   Angela Smith MD   aspirin 81 MG chewable tablet Chew 1 tablet Daily.    ProviderSana MD   atorvastatin (LIPITOR) 80 MG tablet Take 1 tablet by mouth Daily. 2/15/23   RISA Cordero MD   carvedilol (COREG) 6.25 MG tablet Take 1 tablet by mouth 2 (Two) Times a Day. 12/5/23   Di Lama APRN   cetirizine (zyrTEC) 10 MG tablet Take 1 tablet by mouth Daily.    ProviderSana MD   Cyanocobalamin ([employer] B12 GUMMIES PO) Take 2,000 Units by mouth 2 (Two) Times a Day. otc    ProviderSana MD   desvenlafaxine (PRISTIQ) 100 MG 24 hr tablet Take 1 tablet by mouth once daily 11/13/23   Angela Smith MD   levothyroxine (SYNTHROID, LEVOTHROID) 75 MCG tablet Take 1 tablet by mouth once daily for 90 days 12/11/23 3/10/24  Angela Smith MD   losartan (COZAAR) 100 MG tablet Take 1 tablet by mouth once daily 12/11/23   Angela Smith MD   nitroglycerin (NITROSTAT) 0.4 MG SL tablet Place 1 tablet under the tongue Every 5 (Five) Minutes As Needed for Chest Pain (Do not take more than 3 at one time. Go to ER or call 911 if chest pain persists). Take no more than 3 doses in 15 minutes. 5/9/23   Di Lama APRN   ranolazine (Ranexa) 500 MG 12 hr tablet Take 1 tablet by mouth 2 (Two) Times a Day. 5/24/23   Di Lama  "APRN   Semaglutide-Weight Management 1.7 MG/0.75ML solution auto-injector Inject 0.75 mL under the skin into the appropriate area as directed 1 (One) Time Per Week. 12/27/23   Angela Smith MD        Social History:   Social History     Tobacco Use    Smoking status: Never     Passive exposure: Past (childhood)    Smokeless tobacco: Never   Vaping Use    Vaping Use: Never used   Substance Use Topics    Alcohol use: Never    Drug use: Never         Review of Systems:  Review of Systems   Neurological:  Positive for weakness, numbness and headaches.        Physical Exam:  /83   Pulse 73   Temp 98.9 °F (37.2 °C) (Oral)   Resp 20   Ht 175.3 cm (69\")   Wt 117 kg (257 lb)   SpO2 100%   BMI 37.95 kg/m²           General: Awake alert and in no obvious distress, speaking normally    HEENT: Head normocephalic atraumatic, eyes PERRLA EOMI, nose normal, oropharynx normal.    Neck: Supple full range of motion, no meningismus, no lymphadenopathy    Heart: Regular rate and rhythm, no murmurs or rubs, 2+ radial pulses bilaterally    Lungs: Clear to auscultation bilaterally without wheezes or crackles, no respiratory distress    Abdomen: Soft, nontender, nondistended, no rebound or guarding    Skin: Warm, dry, no rash    Musculoskeletal: Normal range of motion, no lower extremity edema    Neurologic: Oriented x3, no facial droop, normal speech, no pronator drift, no motor deficits no sensory deficits.  NIH stroke scale score 0 on arrival    Psychiatric: Mood appears stable, no psychosis              Procedures:  Procedures      Medical Decision Making:      Comorbidities that affect care:    Coronary Artery Disease, Hypertension    External Notes reviewed:    None      The following orders were placed and all results were independently analyzed by me:  Orders Placed This Encounter   Procedures    CT Head Without Contrast Stroke Protocol    CT Angiogram Head w AI Analysis of LVO    CT Angiogram Neck    CT CEREBRAL " PERFUSION WITH & WITHOUT CONTRAST    XR Chest 1 View    MRI Brain Without Contrast    [place] Draw    Comprehensive Metabolic Panel    Protime-INR    aPTT    Single High Sensitivity Troponin T    Urinalysis With Microscopic If Indicated (No Culture) - Urine, Clean Catch    CBC Auto Differential    Initiate Department's Acute Stroke Process (Team D, Code 19, etc)    Perform NIH Stroke Scale    Measure Actual Weight    Head of Bed 30 Degrees or Less    Undress and Gown    Continuous Pulse Oximetry    Vital Signs    Nursing Dysphagia Screening (Complete Prior to Giving anything PO)    POC Glucose Once    ECG 12 Lead ED Triage Standing Order; Acute Stroke (Onset <12 hrs)    CBC & Differential    Green Top (Gel)    Lavender Top    Gold Top - SST    Light Blue Top       Medications Given in the Emergency Department:  Medications   iopamidol (ISOVUE-370) 76 % injection 150 mL (150 mL Intravenous Given 1/9/24 2029)        ED Course:    The patient was initially evaluated in the triage area where orders were placed. The patient was later dispositioned by Jerry Cobian MD.      The patient was advised to stay for completion of workup which includes but is not limited to communication of labs and radiological results, reassessment and plan. The patient was advised that leaving prior to disposition by a provider could result in critical findings that are not communicated to the patient.     ED Course as of 01/11/24 0906   Tue Jan 09, 2024 2131 EKG: I interpreted his twelve-lead EKG is normal sinus rhythm at 73 beats minute, normal P waves, normal QRS, normal ST segments, borderline abnormal T waves in the anteroseptal leads. [VS]      ED Course User Index  [VS] Jerry Cobian MD       Labs:    Lab Results (last 24 hours)       ** No results found for the last 24 hours. **             Imaging:    No Radiology Exams Resulted Within Past 24 Hours      Differential Diagnosis and Discussion:      Stroke: Differential diagnosis  in this patient with signs of possible ischemic stroke include TIA or ischemic stroke, hemorrhagic stroke, hypoglycemic episode, toxic or metabolic encephalopathy, paresthesias.    All labs were reviewed and interpreted by me.  EKG was interpreted by me.  CT scan radiology impression was interpreted by me.    MDM     Amount and/or Complexity of Data Reviewed  Clinical lab tests: reviewed  Tests in the radiology section of CPT®: reviewed  Tests in the medicine section of CPT®: reviewed      This patient is a pleasant 53-year-old male with hypertension hyperlipidemia and underlying heart disease, now presenting with acute onset of feeling very weak while he was out shopping.    He reports feeling weak all over but no unilateral deficits, or difficulty speaking or facial droop.    He states the tip of his tongue felt numb as well.    He has had some moderate headaches the past couple days but does not feel ill or like he is coming down with any cough or congestion or sore throat or fevers or signs of COVID or flu.      Our nursing staff is called a stroke alert and I evaluated him in my initial NIH stroke scale is a 0, although he does complain of some numbness to the tip of his tongue only.    I am having him evaluated further by teleneurology.  I am getting a CT of the head to rule out intracranial bleed.    CT head came back negative for acute findings.  No bleed.         MRI came back negative for acute stroke and he looks to be well-appearing and no obvious deficits so we will discharge him home to follow-up with his PCP this week.      Critical Care Note: Total Critical Care time of 30 minutes. Total critical care time documented does not include time spent on separately billed procedures for services of nurses or physician assistants. I personally saw and examined the patient. I have reviewed all diagnostic interpretations and treatment plans as written. I was present for the key portions of any procedures  performed and the inclusive time noted in any critical care statement. Critical care time includes patient management by me, time spent at the patients bedside,  time to review lab and imaging results, discussing patient care, documentation in the medical record, and time spent with family or caregiver.        Patient Care Considerations:          Consultants/Shared Management Plan:    Consultant: I have discussed the case with our on-call teleneurologist, who agrees to consult on the patient.    Social Determinants of Health:    Patient is independent, reliable, and has access to care.       Disposition and Care Coordination:    Discharged: I considered escalation of care by admitting this patient for observation, however the patient has improved and is suitable and  stable for discharge.    I have explained the patient´s condition, diagnoses and treatment plan based on the information available to me at this time. I have answered questions and addressed any concerns. The patient has a good  understanding of the patient´s diagnosis, condition, and treatment plan as can be expected at this point. The vital signs have been stable. The patient´s condition is stable and appropriate for discharge from the emergency department.      The patient will pursue further outpatient evaluation with the primary care physician or other designated or consulting physician as outlined in the discharge instructions. They are agreeable to this plan of care and follow-up instructions have been explained in detail. The patient has received these instructions in written format and have expressed an understanding of the discharge instructions. The patient is aware that any significant change in condition or worsening of symptoms should prompt an immediate return to this or the closest emergency department or call to 911.  I have explained discharge medications and the need for follow up with the patient/caretakers. This was also printed in  the discharge instructions. Patient was discharged with the following medications and follow up:      Medication List      No changes were made to your prescriptions during this visit.      Angela Smith MD  [address] [place], [place] [id number] [phone]    In 2 days         Final diagnoses:   Generalized weakness   Numbness of tongue        ED Disposition       ED Disposition   Discharge    Condition   Stable    Comment   --               This medical record created using voice recognition software.             Jerry Cobian MD  01/11/24 0906

## 2024-01-11 ENCOUNTER — APPOINTMENT (OUTPATIENT)
Dept: GENERAL RADIOLOGY | Facility: HOSPITAL | Age: 54
End: 2024-01-11
Payer: COMMERCIAL

## 2024-01-11 ENCOUNTER — HOSPITAL ENCOUNTER (OUTPATIENT)
Facility: HOSPITAL | Age: 54
Setting detail: OBSERVATION
Discharge: HOME | End: 2024-01-13
Attending: EMERGENCY MEDICINE | Admitting: STUDENT IN AN ORGANIZED HEALTH CARE EDUCATION/TRAINING PROGRAM
Payer: COMMERCIAL

## 2024-01-11 DIAGNOSIS — I20.0 UNSTABLE ANGINA: Primary | ICD-10-CM

## 2024-01-11 PROBLEM — R07.9 CHEST PAIN: Status: ACTIVE | Noted: 2024-01-11

## 2024-01-11 LAB
ALBUMIN SERPL-MCNC: 4.3 G/DL (ref 3.5–5.2)
ALBUMIN/GLOB SERPL: 1.4 G/DL
ALP SERPL-CCNC: 104 U/L (ref 39–117)
ALT SERPL W P-5'-P-CCNC: 26 U/L (ref 1–41)
ANION GAP SERPL CALCULATED.3IONS-SCNC: 11.7 MMOL/L (ref 5–15)
AST SERPL-CCNC: 20 U/L (ref 1–40)
BASOPHILS # BLD AUTO: 0.07 10*3/MM3 (ref 0–0.2)
BASOPHILS NFR BLD AUTO: 0.7 % (ref 0–1.5)
BILIRUB SERPL-MCNC: 0.5 MG/DL (ref 0–1.2)
BUN SERPL-MCNC: 12 MG/DL (ref 6–20)
BUN/CREAT SERPL: 9.7 (ref 7–25)
CALCIUM SPEC-SCNC: 9 MG/DL (ref 8.6–10.5)
CHLORIDE SERPL-SCNC: 103 MMOL/L (ref 98–107)
CO2 SERPL-SCNC: 23.3 MMOL/L (ref 22–29)
CREAT SERPL-MCNC: 1.24 MG/DL (ref 0.76–1.27)
DEPRECATED RDW RBC AUTO: 42 FL (ref 37–54)
EGFRCR SERPLBLD CKD-EPI 2021: 69.5 ML/MIN/1.73
EOSINOPHIL # BLD AUTO: 0.26 10*3/MM3 (ref 0–0.4)
EOSINOPHIL NFR BLD AUTO: 2.7 % (ref 0.3–6.2)
ERYTHROCYTE [DISTWIDTH] IN BLOOD BY AUTOMATED COUNT: 12.8 % (ref 12.3–15.4)
GEN 5 2HR TROPONIN T REFLEX: <6 NG/L
GLOBULIN UR ELPH-MCNC: 3 GM/DL
GLUCOSE SERPL-MCNC: 82 MG/DL (ref 65–99)
HCT VFR BLD AUTO: 43.6 % (ref 37.5–51)
HGB BLD-MCNC: 14.9 G/DL (ref 13–17.7)
HOLD SPECIMEN: NORMAL
HOLD SPECIMEN: NORMAL
IMM GRANULOCYTES # BLD AUTO: 0.02 10*3/MM3 (ref 0–0.05)
IMM GRANULOCYTES NFR BLD AUTO: 0.2 % (ref 0–0.5)
LIPASE SERPL-CCNC: 46 U/L (ref 13–60)
LYMPHOCYTES # BLD AUTO: 2.79 10*3/MM3 (ref 0.7–3.1)
LYMPHOCYTES NFR BLD AUTO: 28.4 % (ref 19.6–45.3)
MAGNESIUM SERPL-MCNC: 2.4 MG/DL (ref 1.6–2.6)
MCH RBC QN AUTO: 30.8 PG (ref 26.6–33)
MCHC RBC AUTO-ENTMCNC: 34.2 G/DL (ref 31.5–35.7)
MCV RBC AUTO: 90.1 FL (ref 79–97)
MONOCYTES # BLD AUTO: 0.86 10*3/MM3 (ref 0.1–0.9)
MONOCYTES NFR BLD AUTO: 8.8 % (ref 5–12)
NEUTROPHILS NFR BLD AUTO: 5.81 10*3/MM3 (ref 1.7–7)
NEUTROPHILS NFR BLD AUTO: 59.2 % (ref 42.7–76)
NRBC BLD AUTO-RTO: 0 /100 WBC (ref 0–0.2)
NT-PROBNP SERPL-MCNC: <36 PG/ML (ref 0–900)
PLATELET # BLD AUTO: 386 10*3/MM3 (ref 140–450)
PMV BLD AUTO: 9.5 FL (ref 6–12)
POTASSIUM SERPL-SCNC: 4 MMOL/L (ref 3.5–5.2)
PROT SERPL-MCNC: 7.3 G/DL (ref 6–8.5)
QT INTERVAL: 392 MS
QTC INTERVAL: 426 MS
RBC # BLD AUTO: 4.84 10*6/MM3 (ref 4.14–5.8)
SODIUM SERPL-SCNC: 138 MMOL/L (ref 136–145)
TROPONIN T DELTA: NORMAL
TROPONIN T SERPL HS-MCNC: <6 NG/L
WBC NRBC COR # BLD AUTO: 9.81 10*3/MM3 (ref 3.4–10.8)
WHOLE BLOOD HOLD COAG: NORMAL
WHOLE BLOOD HOLD SPECIMEN: NORMAL

## 2024-01-11 PROCEDURE — 93005 ELECTROCARDIOGRAM TRACING: CPT | Performed by: EMERGENCY MEDICINE

## 2024-01-11 PROCEDURE — 99284 EMERGENCY DEPT VISIT MOD MDM: CPT

## 2024-01-11 PROCEDURE — 93005 ELECTROCARDIOGRAM TRACING: CPT

## 2024-01-11 PROCEDURE — G0378 HOSPITAL OBSERVATION PER HR: HCPCS

## 2024-01-11 PROCEDURE — 83735 ASSAY OF MAGNESIUM: CPT

## 2024-01-11 PROCEDURE — 80053 COMPREHEN METABOLIC PANEL: CPT | Performed by: EMERGENCY MEDICINE

## 2024-01-11 PROCEDURE — 85025 COMPLETE CBC W/AUTO DIFF WBC: CPT

## 2024-01-11 PROCEDURE — 99223 1ST HOSP IP/OBS HIGH 75: CPT | Performed by: STUDENT IN AN ORGANIZED HEALTH CARE EDUCATION/TRAINING PROGRAM

## 2024-01-11 PROCEDURE — 36415 COLL VENOUS BLD VENIPUNCTURE: CPT

## 2024-01-11 PROCEDURE — 83690 ASSAY OF LIPASE: CPT

## 2024-01-11 PROCEDURE — 71045 X-RAY EXAM CHEST 1 VIEW: CPT

## 2024-01-11 PROCEDURE — 84484 ASSAY OF TROPONIN QUANT: CPT | Performed by: EMERGENCY MEDICINE

## 2024-01-11 PROCEDURE — 83880 ASSAY OF NATRIURETIC PEPTIDE: CPT

## 2024-01-11 PROCEDURE — 83036 HEMOGLOBIN GLYCOSYLATED A1C: CPT | Performed by: STUDENT IN AN ORGANIZED HEALTH CARE EDUCATION/TRAINING PROGRAM

## 2024-01-11 RX ORDER — LOSARTAN POTASSIUM 50 MG/1
100 TABLET ORAL DAILY
Status: DISCONTINUED | OUTPATIENT
Start: 2024-01-12 | End: 2024-01-13 | Stop reason: HOSPADM

## 2024-01-11 RX ORDER — LEVOTHYROXINE SODIUM 0.07 MG/1
75 TABLET ORAL DAILY
Status: DISCONTINUED | OUTPATIENT
Start: 2024-01-12 | End: 2024-01-13 | Stop reason: HOSPADM

## 2024-01-11 RX ORDER — SODIUM CHLORIDE 0.9 % (FLUSH) 0.9 %
10 SYRINGE (ML) INJECTION AS NEEDED
Status: DISCONTINUED | OUTPATIENT
Start: 2024-01-11 | End: 2024-01-13 | Stop reason: HOSPADM

## 2024-01-11 RX ORDER — BISACODYL 5 MG/1
5 TABLET, DELAYED RELEASE ORAL DAILY PRN
Status: DISCONTINUED | OUTPATIENT
Start: 2024-01-11 | End: 2024-01-13 | Stop reason: HOSPADM

## 2024-01-11 RX ORDER — AMLODIPINE BESYLATE 5 MG/1
5 TABLET ORAL DAILY
Status: DISCONTINUED | OUTPATIENT
Start: 2024-01-12 | End: 2024-01-13 | Stop reason: HOSPADM

## 2024-01-11 RX ORDER — ASPIRIN 81 MG/1
81 TABLET, CHEWABLE ORAL DAILY
Status: DISCONTINUED | OUTPATIENT
Start: 2024-01-12 | End: 2024-01-13 | Stop reason: HOSPADM

## 2024-01-11 RX ORDER — BISACODYL 10 MG
10 SUPPOSITORY, RECTAL RECTAL DAILY PRN
Status: DISCONTINUED | OUTPATIENT
Start: 2024-01-11 | End: 2024-01-13 | Stop reason: HOSPADM

## 2024-01-11 RX ORDER — SODIUM CHLORIDE 0.9 % (FLUSH) 0.9 %
10 SYRINGE (ML) INJECTION EVERY 12 HOURS SCHEDULED
Status: DISCONTINUED | OUTPATIENT
Start: 2024-01-12 | End: 2024-01-13 | Stop reason: HOSPADM

## 2024-01-11 RX ORDER — POLYETHYLENE GLYCOL 3350 17 G/17G
17 POWDER, FOR SOLUTION ORAL DAILY PRN
Status: DISCONTINUED | OUTPATIENT
Start: 2024-01-11 | End: 2024-01-13 | Stop reason: HOSPADM

## 2024-01-11 RX ORDER — HEPARIN SODIUM 5000 [USP'U]/ML
5000 INJECTION, SOLUTION INTRAVENOUS; SUBCUTANEOUS EVERY 8 HOURS SCHEDULED
Status: DISCONTINUED | OUTPATIENT
Start: 2024-01-12 | End: 2024-01-13 | Stop reason: HOSPADM

## 2024-01-11 RX ORDER — AMOXICILLIN 250 MG
2 CAPSULE ORAL 2 TIMES DAILY
Status: DISCONTINUED | OUTPATIENT
Start: 2024-01-12 | End: 2024-01-13 | Stop reason: HOSPADM

## 2024-01-11 RX ORDER — RANOLAZINE 500 MG/1
500 TABLET, EXTENDED RELEASE ORAL 2 TIMES DAILY
Status: DISCONTINUED | OUTPATIENT
Start: 2024-01-12 | End: 2024-01-13 | Stop reason: HOSPADM

## 2024-01-11 RX ORDER — SODIUM CHLORIDE 9 MG/ML
40 INJECTION, SOLUTION INTRAVENOUS AS NEEDED
Status: DISCONTINUED | OUTPATIENT
Start: 2024-01-11 | End: 2024-01-13 | Stop reason: HOSPADM

## 2024-01-11 RX ORDER — ATORVASTATIN CALCIUM 40 MG/1
80 TABLET, FILM COATED ORAL DAILY
Status: DISCONTINUED | OUTPATIENT
Start: 2024-01-12 | End: 2024-01-13 | Stop reason: HOSPADM

## 2024-01-11 RX ORDER — NITROGLYCERIN 0.4 MG/1
0.4 TABLET SUBLINGUAL
Status: DISCONTINUED | OUTPATIENT
Start: 2024-01-11 | End: 2024-01-13 | Stop reason: HOSPADM

## 2024-01-11 RX ORDER — ASPIRIN 81 MG/1
324 TABLET, CHEWABLE ORAL ONCE
Status: COMPLETED | OUTPATIENT
Start: 2024-01-11 | End: 2024-01-11

## 2024-01-11 RX ORDER — CETIRIZINE HYDROCHLORIDE 10 MG/1
10 TABLET ORAL DAILY
Status: DISCONTINUED | OUTPATIENT
Start: 2024-01-12 | End: 2024-01-13 | Stop reason: HOSPADM

## 2024-01-11 RX ADMIN — Medication 10 ML: at 23:41

## 2024-01-11 RX ADMIN — ASPIRIN 243 MG: 81 TABLET, CHEWABLE ORAL at 16:57

## 2024-01-11 NOTE — ED PROVIDER NOTES
Time: 4:15 PM EST  Date of encounter:  1/11/2024  Independent Historian/Clinical History and Information was obtained by:   Patient and Family    History is limited by: N/A    Chief Complaint   Patient presents with    Chest Pain         History of Present Illness:  Patient is a 53 y.o. year old male who presents to the emergency department for evaluation of chest pain.  Patient states he began having midsternal chest pain approximately 2:30 PM today that is rating to his jaw.  Patient states he has history of MI and this feels similar to the last time that he had a heart attack.  Patient is denying any shortness of breath.  (Provider in triage, Cecilio Rubalcava PA-C)  Patient does admit to exertional chest pain with past few weeks.      Patient Care Team  Primary Care Provider: Angela Smith MD    Past Medical History:     No Known Allergies  Past Medical History:   Diagnosis Date    ADHD (attention deficit hyperactivity disorder) 2008    Allergic 2000    AR (allergic rhinitis)     Arthritis     Colitis     Colon polyp 2005    Depression     ETD (eustachian tube dysfunction)     Forgetfulness     Hearing loss     High cholesterol     Hyperlipemia     Hypertension     Hypothyroidism 2005    Inflammatory bowel disease 2005    Labral tear of long head of biceps tendon, initial encounter 09/30/2015    Labral tear of shoulder, left, sequela 09/25/2015    Obesity 2010    Right lateral epicondylitis 10/21/2016    Seasonal allergies     Thyroid disorder      Past Surgical History:   Procedure Laterality Date    CARDIAC CATHETERIZATION N/A 5/5/2022    Procedure: Left Heart Cath;  Surgeon: Samy Kaur MD;  Location: [facility] INVASIVE LOCATION;  Service: Cardiovascular;  Laterality: N/A;    CARDIAC CATHETERIZATION N/A 5/5/2022    Procedure: Percutaneous Coronary Intervention;  Surgeon: Samy Kaur MD;  Location: [facility] INVASIVE LOCATION;  Service: Cardiovascular;  Laterality: N/A;    CHOLECYSTECTOMY   2000    COLONOSCOPY      EAR TUBES      GALLBLADDER SURGERY      OTHER SURGICAL HISTORY      JOINT SURGERY     Family History   Problem Relation Age of Onset    Arthritis Mother     Cancer Mother     Heart disease Mother     Diabetes Mother     Heart disease Father     Heart disease Sister     Stroke Maternal Grandmother        Home Medications:  Prior to Admission medications    Medication Sig Start Date End Date Taking? Authorizing Provider   amLODIPine (NORVASC) 5 MG tablet Take 1 tablet by mouth Daily. 12/27/23   Angela Smith MD   aspirin 81 MG chewable tablet Chew 1 tablet Daily.    ProviderSana MD   atorvastatin (LIPITOR) 80 MG tablet Take 1 tablet by mouth Daily. 2/15/23   RISA Cordero MD   carvedilol (COREG) 6.25 MG tablet Take 1 tablet by mouth 2 (Two) Times a Day. 12/5/23   Di Lama APRN   cetirizine (zyrTEC) 10 MG tablet Take 1 tablet by mouth Daily.    ProviderSana MD   Cyanocobalamin ([employer] B12 GUMMIES PO) Take 2,000 Units by mouth 2 (Two) Times a Day. otc    ProviderSana MD   desvenlafaxine (PRISTIQ) 100 MG 24 hr tablet Take 1 tablet by mouth once daily 11/13/23   Angela Smith MD   levothyroxine (SYNTHROID, LEVOTHROID) 75 MCG tablet Take 1 tablet by mouth once daily for 90 days 12/11/23 3/10/24  Angela Smith MD   losartan (COZAAR) 100 MG tablet Take 1 tablet by mouth once daily 12/11/23   Angela Smith MD   nitroglycerin (NITROSTAT) 0.4 MG SL tablet Place 1 tablet under the tongue Every 5 (Five) Minutes As Needed for Chest Pain (Do not take more than 3 at one time. Go to ER or call 911 if chest pain persists). Take no more than 3 doses in 15 minutes. 5/9/23   Di Lama APRN   ranolazine (Ranexa) 500 MG 12 hr tablet Take 1 tablet by mouth 2 (Two) Times a Day. 5/24/23   Di Lama APRN   Semaglutide-Weight Management 1.7 MG/0.75ML solution auto-injector Inject 0.75 mL under the skin into the appropriate area  "as directed 1 (One) Time Per Week. 12/27/23   Angela Smith MD        Social History:   Social History     Tobacco Use    Smoking status: Never     Passive exposure: Past (childhood)    Smokeless tobacco: Never   Vaping Use    Vaping Use: Never used   Substance Use Topics    Alcohol use: Never    Drug use: Never         Review of Systems:  Review of Systems   Constitutional:  Negative for chills and fever.   HENT:  Negative for congestion, rhinorrhea and sore throat.    Eyes:  Negative for photophobia.   Respiratory:  Positive for shortness of breath (\"A little bit\" during pain only). Negative for apnea, cough and chest tightness.    Cardiovascular:  Positive for chest pain. Negative for palpitations.   Gastrointestinal:  Negative for abdominal pain, diarrhea, nausea and vomiting.   Endocrine: Negative.    Genitourinary:  Negative for difficulty urinating and dysuria.   Musculoskeletal:  Negative for back pain, joint swelling and myalgias.   Skin:  Negative for color change and wound.   Allergic/Immunologic: Negative.    Neurological:  Negative for seizures and headaches.   Psychiatric/Behavioral: Negative.     All other systems reviewed and are negative.       Physical Exam:  /61   Pulse 76   Temp 98.5 °F (36.9 °C) (Oral)   Resp 18   Ht 175.3 cm (69\")   Wt 110 kg (242 lb 15.2 oz)   SpO2 98%   BMI 35.88 kg/m²         Physical Exam  Vitals and nursing note reviewed.   Constitutional:       General: He is awake.      Appearance: Normal appearance.   HENT:      Head: Normocephalic and atraumatic.      Nose: Nose normal.      Mouth/Throat:      Mouth: Mucous membranes are moist.   Eyes:      Extraocular Movements: Extraocular movements intact.      Conjunctiva/sclera: Conjunctivae normal.      Pupils: Pupils are equal, round, and reactive to light.   Cardiovascular:      Rate and Rhythm: Normal rate and regular rhythm.      Heart sounds: Normal heart sounds.   Pulmonary:      Effort: Pulmonary effort " is normal. No respiratory distress.      Breath sounds: Normal breath sounds. No wheezing, rhonchi or rales.   Abdominal:      General: Bowel sounds are normal. There is no distension.      Palpations: Abdomen is soft.      Tenderness: There is no abdominal tenderness. There is no guarding or rebound.      Comments: No rigidity   Musculoskeletal:         General: No tenderness. Normal range of motion.      Cervical back: Normal range of motion and neck supple.   Skin:     General: Skin is warm and dry.      Coloration: Skin is not cyanotic or jaundiced.   Neurological:      General: No focal deficit present.      Mental Status: He is alert and oriented to person, place, and time. Mental status is at baseline.   Psychiatric:         Attention and Perception: Attention and perception normal.         Mood and Affect: Mood normal.                      Procedures:  Procedures      Medical Decision Making:      Comorbidities that affect care:    CAD/MI, IBS, hypothyroidism, depression, hypertension    External Notes reviewed:    Previous Clinic Note: Internal medicine office visit 12/27/2023 with Angela Smith MD.  Description: Unspecified chest pain, hypothyroidism.  Assessment and plan as follows:  Assessment and Plan   Diagnoses and all orders for this visit:     1. Chest pain, unspecified type (Primary)  Comments:  Continue work with cardiology  Orders:  -     Hepatitis C antibody; Future  -     Comprehensive Metabolic Panel  -     CBC & Differential  -     TSH  -     Lipid Panel  -     Hemoglobin A1c; Future     2. Hypothyroidism due to Hashimoto's thyroiditis  Comments:  Will check labs and adjust as needed  Orders:  -     TSH  -     T4, Free     3. Anxiety  Comments:  Doing well on Pristiq  Overview:  doing well on current meds        4. Primary hypertension  Comments:  Will add amlodipine warned to monitor for dizziness lightheadedness and leg swelling  Orders:  -     Hepatitis C antibody; Future  -      Comprehensive Metabolic Panel  -     CBC & Differential  -     TSH  -     Lipid Panel  -     Hemoglobin A1c; Future     5. Mixed hyperlipidemia  Comments:  Will check labs and adjust as needed  Orders:  -     Hepatitis C antibody; Future  -     Comprehensive Metabolic Panel  -     CBC & Differential  -     TSH  -     Lipid Panel  -     Hemoglobin A1c; Future     6. Arthralgia, unspecified joint  Comments:  Will check MARLENE however likely osteoarthritis discussed treatment for that  Orders:  -     MARLENE by IFA, Reflex 9-biomarkers profile; Future     Other orders  -     amLODIPine (NORVASC) 5 MG tablet; Take 1 tablet by mouth Daily.  Dispense: 90 tablet; Refill: 1  -     Semaglutide-Weight Management 1.7 MG/0.75ML solution auto-injector; Inject 0.75 mL under the skin into the appropriate area as directed 1 (One) Time Per Week.  Dispense: 6 mL; Refill: 1                       FOLLOW UP  Return in about 3 months (around 3/27/2024).  Patient was given instructions and counseling regarding his condition or for health maintenance advice. Please see specific information pulled into the AVS if appropriate.         Angela Smith MD  12/27/23  09:33 EST               Review of cardiac catheterization as follows:  Assessment and Plan   Diagnoses and all orders for this visit:     1. Chest pain, unspecified type (Primary)  Comments:  Continue work with cardiology  Orders:  -     Hepatitis C antibody; Future  -     Comprehensive Metabolic Panel  -     CBC & Differential  -     TSH  -     Lipid Panel  -     Hemoglobin A1c; Future     2. Hypothyroidism due to Hashimoto's thyroiditis  Comments:  Will check labs and adjust as needed  Orders:  -     TSH  -     T4, Free     3. Anxiety  Comments:  Doing well on Pristiq  Overview:  doing well on current meds        4. Primary hypertension  Comments:  Will add amlodipine warned to monitor for dizziness lightheadedness and leg swelling  Orders:  -     Hepatitis C antibody; Future  -      Comprehensive Metabolic Panel  -     CBC & Differential  -     TSH  -     Lipid Panel  -     Hemoglobin A1c; Future     5. Mixed hyperlipidemia  Comments:  Will check labs and adjust as needed  Orders:  -     Hepatitis C antibody; Future  -     Comprehensive Metabolic Panel  -     CBC & Differential  -     TSH  -     Lipid Panel  -     Hemoglobin A1c; Future     6. Arthralgia, unspecified joint  Comments:  Will check MARLENE however likely osteoarthritis discussed treatment for that  Orders:  -     MARLENE by IFA, Reflex 9-biomarkers profile; Future     Other orders  -     amLODIPine (NORVASC) 5 MG tablet; Take 1 tablet by mouth Daily.  Dispense: 90 tablet; Refill: 1  -     Semaglutide-Weight Management 1.7 MG/0.75ML solution auto-injector; Inject 0.75 mL under the skin into the appropriate area as directed 1 (One) Time Per Week.  Dispense: 6 mL; Refill: 1                       FOLLOW UP  Return in about 3 months (around 3/27/2024).  Patient was given instructions and counseling regarding his condition or for health maintenance advice. Please see specific information pulled into the AVS if appropriate.         Angela Smith MD  12/27/23  09:33 EST             The following orders were placed and all results were independently analyzed by me:  Orders Placed This Encounter   Procedures    XR Chest 1 View    [place] Draw    High Sensitivity Troponin T    Comprehensive Metabolic Panel    Lipase    BNP    Magnesium    CBC Auto Differential    High Sensitivity Troponin T 2Hr    NPO Diet NPO Type: Strict NPO    Undress & Gown    Continuous Pulse Oximetry    Hospitalist (on-call MD unless specified)    Oxygen Therapy- Nasal Cannula; Titrate 1-6 LPM Per SpO2; 90 - 95%    ECG 12 Lead ED Triage Standing Order; Chest Pain    ECG 12 Lead ED Triage Standing Order; Chest Pain    Insert Peripheral IV    CBC & Differential    Green Top (Gel)    Lavender Top    Gold Top - SST    Light Blue Top       Medications Given in the Emergency  Department:  Medications   sodium chloride 0.9 % flush 10 mL (has no administration in time range)   aspirin chewable tablet 324 mg (243 mg Oral Given 1/11/24 1657)        ED Course:    The patient was initially evaluated in the triage area where orders were placed. The patient was later dispositioned by Dustin Sun MD.      The patient was advised to stay for completion of workup which includes but is not limited to communication of labs and radiological results, reassessment and plan. The patient was advised that leaving prior to disposition by a provider could result in critical findings that are not communicated to the patient.     ED Course as of 01/11/24 2105   Thu Jan 11, 2024   1616 PROVIDER IN TRIAGE  Patient was evaluated by me in triage, Cecilio Rubalcava PA-C.  Orders were placed and patient is currently awaiting final results and disposition.  [MD]   1809 I have personally interpreted the EKG today and it shows no evidence of any acute ischemia or heart arrhythmia. [RP]   1820 Currently pain-free. [RP]      ED Course User Index  [MD] Cecilio Rubalcava PA-C  [RP] Dustin Sun MD       Labs:    Lab Results (last 24 hours)       Procedure Component Value Units Date/Time    CBC & Differential [[id number]] Collected: 01/11/24 1631    Specimen: Blood from Arm, Left Updated: 01/11/24 1653    Narrative:      The following orders were created for panel order CBC & Differential.  Procedure                               Abnormality         Status                     ---------                               -----------         ------                     CBC Auto Differential[[id number]]        Normal              Final result               Scan Slide[[id number]]                                                                    Please view results for these tests on the individual orders.    Lipase [[id number]]  (Normal) Collected: 01/11/24 1631    Specimen: Blood from Arm, Left Updated: 01/11/24 1705      Lipase 46 U/L     BNP [[id number]]  (Normal) Collected: 01/11/24 1631    Specimen: Blood from Arm, Left Updated: 01/11/24 1701     proBNP <36.0 pg/mL     Narrative:      This assay is used as an aid in the diagnosis of individuals suspected of having heart failure. It can be used as an aid in the diagnosis of acute decompensated heart failure (ADHF) in patients presenting with signs and symptoms of ADHF to the emergency department (ED). In addition, NT-proBNP of <300 pg/mL indicates ADHF is not likely.    Age Range Result Interpretation  NT-proBNP Concentration (pg/mL:      <50             Positive            >450                   Gray                 300-450                    Negative             <300    50-75           Positive            >900                  Gray                300-900                  Negative            <300      >75             Positive            >1800                  Gray                300-1800                  Negative            <300    Magnesium [[id number]]  (Normal) Collected: 01/11/24 1631    Specimen: Blood from Arm, Left Updated: 01/11/24 1710     Magnesium 2.4 mg/dL     CBC Auto Differential [[id number]]  (Normal) Collected: 01/11/24 1631    Specimen: Blood from Arm, Left Updated: 01/11/24 1653     WBC 9.81 10*3/mm3      RBC 4.84 10*6/mm3      Hemoglobin 14.9 g/dL      Hematocrit 43.6 %      MCV 90.1 fL      MCH 30.8 pg      MCHC 34.2 g/dL      RDW 12.8 %      RDW-SD 42.0 fl      MPV 9.5 fL      Platelets 386 10*3/mm3      Neutrophil % 59.2 %      Lymphocyte % 28.4 %      Monocyte % 8.8 %      Eosinophil % 2.7 %      Basophil % 0.7 %      Immature Grans % 0.2 %      Neutrophils, Absolute 5.81 10*3/mm3      Lymphocytes, Absolute 2.79 10*3/mm3      Monocytes, Absolute 0.86 10*3/mm3      Eosinophils, Absolute 0.26 10*3/mm3      Basophils, Absolute 0.07 10*3/mm3      Immature Grans, Absolute 0.02 10*3/mm3      nRBC 0.0 /100 WBC     High Sensitivity Troponin T [[id number]]  (Normal)  Collected: 01/11/24 1720    Specimen: Blood from Arm, Left Updated: 01/11/24 1803     HS Troponin T <6 ng/L     Narrative:      High Sensitive Troponin T Reference Range:  <14.0 ng/L- Negative Female for AMI  <22.0 ng/L- Negative Male for AMI  >=14 - Abnormal Female indicating possible myocardial injury.  >=22 - Abnormal Male indicating possible myocardial injury.   Clinicians would have to utilize clinical acumen, EKG, Troponin, and serial changes to determine if it is an Acute Myocardial Infarction or myocardial injury due to an underlying chronic condition.         Comprehensive Metabolic Panel [[id number]] Collected: 01/11/24 1720    Specimen: Blood from Arm, Left Updated: 01/11/24 1805     Glucose 82 mg/dL      BUN 12 mg/dL      Creatinine 1.24 mg/dL      Sodium 138 mmol/L      Potassium 4.0 mmol/L      Comment: Slight hemolysis detected by analyzer. Result may be falsely elevated.        Chloride 103 mmol/L      CO2 23.3 mmol/L      Calcium 9.0 mg/dL      Total Protein 7.3 g/dL      Albumin 4.3 g/dL      ALT (SGPT) 26 U/L      AST (SGOT) 20 U/L      Alkaline Phosphatase 104 U/L      Total Bilirubin 0.5 mg/dL      Globulin 3.0 gm/dL      A/G Ratio 1.4 g/dL      BUN/Creatinine Ratio 9.7     Anion Gap 11.7 mmol/L      eGFR 69.5 mL/min/1.73     Narrative:      GFR Normal >60  Chronic Kidney Disease <60  Kidney Failure <15      High Sensitivity Troponin T 2Hr [[id number]] Collected: 01/11/24 1925    Specimen: Blood Updated: 01/11/24 2003     HS Troponin T <6 ng/L      Troponin T Delta --     Comment: Unable to calculate.       Narrative:      High Sensitive Troponin T Reference Range:  <14.0 ng/L- Negative Female for AMI  <22.0 ng/L- Negative Male for AMI  >=14 - Abnormal Female indicating possible myocardial injury.  >=22 - Abnormal Male indicating possible myocardial injury.   Clinicians would have to utilize clinical acumen, EKG, Troponin, and serial changes to determine if it is an Acute Myocardial Infarction or  myocardial injury due to an underlying chronic condition.                  Imaging:    XR Chest 1 View    Result Date: 1/11/2024  PROCEDURE: XR CHEST 1 VW  COMPARISON: [facility], CR, XR CHEST 1 VW, 1/09/2024, 20:36.  INDICATIONS: had heartattack a few years ago, chest pain since 2:30 this afternoon  FINDINGS:  The cardiomediastinal silhouette is within normal limits.  Pulmonary vascularity appears normal.  There is no focal airspace consolidation, pleural effusion, or pneumothorax.  There are degenerative changes of the thoracic spine.        1. No acute cardiopulmonary abnormality.        LETITIA OCHOA MD       Electronically Signed and Approved By: LETITIA OCHOA MD on 1/11/2024 at 17:01                Differential Diagnosis and Discussion:      Chest Pain:  Based on the patient's signs and symptoms, I considered aortic dissection, myocardial infaction, pulmonary embolism, cardiac tamponade, pericarditis, pneumothorax, musculoskeletal chest pain and other differential diagnosis as an etiology of the patient's chest pain.     All labs were reviewed and interpreted by me.  All X-rays impressions were independently interpreted by me.  EKG was interpreted by me.    MDM     Amount and/or Complexity of Data Reviewed  Clinical lab tests: reviewed  Tests in the radiology section of CPT®: reviewed  Tests in the medicine section of CPT®: reviewed  Decide to obtain previous medical records or to obtain history from someone other than the patient: yes                 Patient Care Considerations:    PERC: I used the PERC score to risk stratify the patient for PE and a CT of the chest was considered but ultimately not indicated in today's visit.      Consultants/Shared Management Plan:    Hospitalist: I have discussed the case with Dr. Lopez who agrees to accept the patient for admission.  Consultant: I have discussed the case with Dr. Cordero, cardiology who states we should admit the patient for stress test  in the morning.    Social Determinants of Health:    Patient is independent, reliable, and has access to care.       Disposition and Care Coordination:    Admit:   Through independent evaluation of the patient's history, physical, and imperical data, the patient meets criteria for observation/admission to the hospital.        Final diagnoses:   Unstable angina        ED Disposition       ED Disposition   Decision to Admit    Condition   --    Comment   --               This medical record created using voice recognition software.             Dustin Sun MD  01/11/24 3562

## 2024-01-11 NOTE — Clinical Note
A 6 fr sheath was  inserted into the right radial artery. Sheath insertion not delayed.
ACT = 266 (sec). ACT was drawn at 10:58 EST. ACT result was completed at 11:03 EST.
Allergies reviewed.  H&P note has been confirmed for the patient. Procedural consent has been signed.  Staff has reviewed the patient's labs.
Balloon inserted in left anterior descending.
Calculated contrast threshold is 332.29 mL.
Calculated contrast threshold is 332.29 mL.
Catheter Pulled back from LV to AO. Measurement captured.
Catheter inserted with wire simultaneously.
First balloon inflation max pressure = 14 carmela. First balloon inflation duration = 10 seconds.
First balloon inflation max pressure = 16 carmela. First balloon inflation duration = 16 seconds.
First balloon inflation max pressure = 16 carmela. First balloon inflation duration = 16 seconds.
First balloon inflation max pressure = 16 carmela. First balloon inflation duration = 18 seconds.
First balloon inflation max pressure = 20 carmela. First balloon inflation duration = 15 seconds.
Hemostasis started on the right radial artery. R-Band was used in achieving hemostasis. Radial compression device applied to vessel. Hemostasis achieved successfully.
IVUS Procedure End
Inserted under fluoro.
Interventional Guidewire removed without incident
Intravascular ultrasound catheter inserted for high resolution imaging
Left anterior descending stent inserted.
No in lab complications
Patient was given Post Procedure instruction by the staff.
Physician notified by staff.
Prepped: right groin and Right Wrist. Prepped with: ChloraPrep. The site was clipped. The patient was draped in a sterile fashion.
Pressure wire disconnected and working over Omni Wire
Pressure wire inserted and procedure begun.
Pressure wire inserted and procedure begun.
Pressure wire removed and procedure ended. 
Pressure wire removed and procedure ended. 
Removed intact
Stent balloon removed intact.
The left coronary artery was selectively engaged, injected and visualized.
The physician has confirmed that the patient has been reassessed and is appropriate for moderate sedation
The right DP pulse is +2. The right PT pulse is +2. The right radial pulse is +2.
The right coronary artery was selectively engaged, injected and visualized.
Wire inserted in left anterior descending.
catheter advanced into LV.
catheter removed  over the wire.
inserted over wire.
removed.
36.6

## 2024-01-12 LAB
ACT BLD: 266 SECONDS (ref 89–137)
ALBUMIN SERPL-MCNC: 4 G/DL (ref 3.5–5.2)
ALBUMIN/GLOB SERPL: 1.4 G/DL
ALP SERPL-CCNC: 96 U/L (ref 39–117)
ALT SERPL W P-5'-P-CCNC: 26 U/L (ref 1–41)
ANION GAP SERPL CALCULATED.3IONS-SCNC: 11.4 MMOL/L (ref 5–15)
AST SERPL-CCNC: 17 U/L (ref 1–40)
BASOPHILS # BLD AUTO: 0.05 10*3/MM3 (ref 0–0.2)
BASOPHILS NFR BLD AUTO: 0.6 % (ref 0–1.5)
BILIRUB SERPL-MCNC: 0.4 MG/DL (ref 0–1.2)
BUN SERPL-MCNC: 13 MG/DL (ref 6–20)
BUN/CREAT SERPL: 10.8 (ref 7–25)
CALCIUM SPEC-SCNC: 8.7 MG/DL (ref 8.6–10.5)
CHLORIDE SERPL-SCNC: 104 MMOL/L (ref 98–107)
CHOLEST SERPL-MCNC: 132 MG/DL (ref 0–200)
CO2 SERPL-SCNC: 22.6 MMOL/L (ref 22–29)
CREAT SERPL-MCNC: 1.2 MG/DL (ref 0.76–1.27)
DEPRECATED RDW RBC AUTO: 39.8 FL (ref 37–54)
EGFRCR SERPLBLD CKD-EPI 2021: 72.3 ML/MIN/1.73
EOSINOPHIL # BLD AUTO: 0.25 10*3/MM3 (ref 0–0.4)
EOSINOPHIL NFR BLD AUTO: 2.8 % (ref 0.3–6.2)
ERYTHROCYTE [DISTWIDTH] IN BLOOD BY AUTOMATED COUNT: 12.2 % (ref 12.3–15.4)
GLOBULIN UR ELPH-MCNC: 2.8 GM/DL
GLUCOSE SERPL-MCNC: 88 MG/DL (ref 65–99)
HBA1C MFR BLD: 5.6 % (ref 4.8–5.6)
HCT VFR BLD AUTO: 38.7 % (ref 37.5–51)
HDLC SERPL-MCNC: 28 MG/DL (ref 40–60)
HGB BLD-MCNC: 13.3 G/DL (ref 13–17.7)
IMM GRANULOCYTES # BLD AUTO: 0.03 10*3/MM3 (ref 0–0.05)
IMM GRANULOCYTES NFR BLD AUTO: 0.3 % (ref 0–0.5)
LDLC SERPL CALC-MCNC: 68 MG/DL (ref 0–100)
LDLC/HDLC SERPL: 2.16 {RATIO}
LYMPHOCYTES # BLD AUTO: 2.81 10*3/MM3 (ref 0.7–3.1)
LYMPHOCYTES NFR BLD AUTO: 32 % (ref 19.6–45.3)
MAGNESIUM SERPL-MCNC: 2 MG/DL (ref 1.6–2.6)
MCH RBC QN AUTO: 30.6 PG (ref 26.6–33)
MCHC RBC AUTO-ENTMCNC: 34.4 G/DL (ref 31.5–35.7)
MCV RBC AUTO: 89 FL (ref 79–97)
MONOCYTES # BLD AUTO: 0.67 10*3/MM3 (ref 0.1–0.9)
MONOCYTES NFR BLD AUTO: 7.6 % (ref 5–12)
NEUTROPHILS NFR BLD AUTO: 4.98 10*3/MM3 (ref 1.7–7)
NEUTROPHILS NFR BLD AUTO: 56.7 % (ref 42.7–76)
NRBC BLD AUTO-RTO: 0 /100 WBC (ref 0–0.2)
PHOSPHATE SERPL-MCNC: 3.3 MG/DL (ref 2.5–4.5)
PLATELET # BLD AUTO: 332 10*3/MM3 (ref 140–450)
PMV BLD AUTO: 9.2 FL (ref 6–12)
POTASSIUM SERPL-SCNC: 3.7 MMOL/L (ref 3.5–5.2)
PROT SERPL-MCNC: 6.8 G/DL (ref 6–8.5)
QT INTERVAL: 377 MS
QT INTERVAL: 387 MS
QTC INTERVAL: 435 MS
QTC INTERVAL: 436 MS
RBC # BLD AUTO: 4.35 10*6/MM3 (ref 4.14–5.8)
SODIUM SERPL-SCNC: 138 MMOL/L (ref 136–145)
TRIGL SERPL-MCNC: 218 MG/DL (ref 0–150)
VLDLC SERPL-MCNC: 36 MG/DL (ref 5–40)
WBC NRBC COR # BLD AUTO: 8.79 10*3/MM3 (ref 3.4–10.8)

## 2024-01-12 PROCEDURE — 99152 MOD SED SAME PHYS/QHP 5/>YRS: CPT | Performed by: INTERNAL MEDICINE

## 2024-01-12 PROCEDURE — C1725 CATH, TRANSLUMIN NON-LASER: HCPCS | Performed by: INTERNAL MEDICINE

## 2024-01-12 PROCEDURE — 80053 COMPREHEN METABOLIC PANEL: CPT | Performed by: STUDENT IN AN ORGANIZED HEALTH CARE EDUCATION/TRAINING PROGRAM

## 2024-01-12 PROCEDURE — 96372 THER/PROPH/DIAG INJ SC/IM: CPT

## 2024-01-12 PROCEDURE — 92978 ENDOLUMINL IVUS OCT C 1ST: CPT | Performed by: INTERNAL MEDICINE

## 2024-01-12 PROCEDURE — C1753 CATH, INTRAVAS ULTRASOUND: HCPCS | Performed by: INTERNAL MEDICINE

## 2024-01-12 PROCEDURE — C1769 GUIDE WIRE: HCPCS | Performed by: INTERNAL MEDICINE

## 2024-01-12 PROCEDURE — G0378 HOSPITAL OBSERVATION PER HR: HCPCS

## 2024-01-12 PROCEDURE — C1874 STENT, COATED/COV W/DEL SYS: HCPCS | Performed by: INTERNAL MEDICINE

## 2024-01-12 PROCEDURE — 93458 L HRT ARTERY/VENTRICLE ANGIO: CPT | Performed by: INTERNAL MEDICINE

## 2024-01-12 PROCEDURE — 85347 COAGULATION TIME ACTIVATED: CPT

## 2024-01-12 PROCEDURE — 25010000002 FENTANYL CITRATE (PF) 50 MCG/ML SOLUTION: Performed by: INTERNAL MEDICINE

## 2024-01-12 PROCEDURE — 84100 ASSAY OF PHOSPHORUS: CPT | Performed by: STUDENT IN AN ORGANIZED HEALTH CARE EDUCATION/TRAINING PROGRAM

## 2024-01-12 PROCEDURE — C9600 PERC DRUG-EL COR STENT SING: HCPCS | Performed by: INTERNAL MEDICINE

## 2024-01-12 PROCEDURE — 25510000001 IOPAMIDOL PER 1 ML: Performed by: INTERNAL MEDICINE

## 2024-01-12 PROCEDURE — 93005 ELECTROCARDIOGRAM TRACING: CPT | Performed by: INTERNAL MEDICINE

## 2024-01-12 PROCEDURE — 99232 SBSQ HOSP IP/OBS MODERATE 35: CPT | Performed by: INTERNAL MEDICINE

## 2024-01-12 PROCEDURE — 99153 MOD SED SAME PHYS/QHP EA: CPT | Performed by: INTERNAL MEDICINE

## 2024-01-12 PROCEDURE — 83735 ASSAY OF MAGNESIUM: CPT | Performed by: STUDENT IN AN ORGANIZED HEALTH CARE EDUCATION/TRAINING PROGRAM

## 2024-01-12 PROCEDURE — C1887 CATHETER, GUIDING: HCPCS | Performed by: INTERNAL MEDICINE

## 2024-01-12 PROCEDURE — 93799 UNLISTED CV SVC/PROCEDURE: CPT | Performed by: INTERNAL MEDICINE

## 2024-01-12 PROCEDURE — 93571 IV DOP VEL&/PRESS C FLO 1ST: CPT | Performed by: INTERNAL MEDICINE

## 2024-01-12 PROCEDURE — 25010000002 HEPARIN (PORCINE) PER 1000 UNITS: Performed by: INTERNAL MEDICINE

## 2024-01-12 PROCEDURE — 25010000002 HEPARIN (PORCINE) PER 1000 UNITS: Performed by: STUDENT IN AN ORGANIZED HEALTH CARE EDUCATION/TRAINING PROGRAM

## 2024-01-12 PROCEDURE — C1894 INTRO/SHEATH, NON-LASER: HCPCS | Performed by: INTERNAL MEDICINE

## 2024-01-12 PROCEDURE — 85025 COMPLETE CBC W/AUTO DIFF WBC: CPT | Performed by: STUDENT IN AN ORGANIZED HEALTH CARE EDUCATION/TRAINING PROGRAM

## 2024-01-12 PROCEDURE — 92928 PRQ TCAT PLMT NTRAC ST 1 LES: CPT | Performed by: INTERNAL MEDICINE

## 2024-01-12 PROCEDURE — 80061 LIPID PANEL: CPT | Performed by: STUDENT IN AN ORGANIZED HEALTH CARE EDUCATION/TRAINING PROGRAM

## 2024-01-12 PROCEDURE — 25810000003 SODIUM CHLORIDE 0.9 % SOLUTION: Performed by: INTERNAL MEDICINE

## 2024-01-12 PROCEDURE — 99214 OFFICE O/P EST MOD 30 MIN: CPT | Performed by: INTERNAL MEDICINE

## 2024-01-12 PROCEDURE — 25010000002 MIDAZOLAM PER 1MG: Performed by: INTERNAL MEDICINE

## 2024-01-12 PROCEDURE — 25010000002 DIPHENHYDRAMINE PER 50 MG: Performed by: INTERNAL MEDICINE

## 2024-01-12 DEVICE — IMPLANTABLE DEVICE: Type: IMPLANTABLE DEVICE | Status: FUNCTIONAL

## 2024-01-12 RX ORDER — ONDANSETRON 2 MG/ML
4 INJECTION INTRAMUSCULAR; INTRAVENOUS EVERY 6 HOURS PRN
Status: DISCONTINUED | OUTPATIENT
Start: 2024-01-12 | End: 2024-01-13 | Stop reason: HOSPADM

## 2024-01-12 RX ORDER — SODIUM CHLORIDE 9 MG/ML
125 INJECTION, SOLUTION INTRAVENOUS CONTINUOUS
Status: ACTIVE | OUTPATIENT
Start: 2024-01-12 | End: 2024-01-12

## 2024-01-12 RX ORDER — FENTANYL CITRATE 50 UG/ML
INJECTION, SOLUTION INTRAMUSCULAR; INTRAVENOUS
Status: DISCONTINUED | OUTPATIENT
Start: 2024-01-12 | End: 2024-01-12 | Stop reason: HOSPADM

## 2024-01-12 RX ORDER — ONDANSETRON 4 MG/1
4 TABLET, ORALLY DISINTEGRATING ORAL EVERY 6 HOURS PRN
Status: DISCONTINUED | OUTPATIENT
Start: 2024-01-12 | End: 2024-01-13 | Stop reason: HOSPADM

## 2024-01-12 RX ORDER — ASPIRIN 81 MG/1
81 TABLET, CHEWABLE ORAL DAILY
Status: DISCONTINUED | OUTPATIENT
Start: 2024-01-13 | End: 2024-01-13 | Stop reason: HOSPADM

## 2024-01-12 RX ORDER — VERAPAMIL HYDROCHLORIDE 2.5 MG/ML
INJECTION, SOLUTION INTRAVENOUS
Status: DISCONTINUED | OUTPATIENT
Start: 2024-01-12 | End: 2024-01-12 | Stop reason: HOSPADM

## 2024-01-12 RX ORDER — LIDOCAINE HYDROCHLORIDE 20 MG/ML
INJECTION, SOLUTION INFILTRATION; PERINEURAL
Status: DISCONTINUED | OUTPATIENT
Start: 2024-01-12 | End: 2024-01-12 | Stop reason: HOSPADM

## 2024-01-12 RX ORDER — DIPHENHYDRAMINE HYDROCHLORIDE 50 MG/ML
INJECTION INTRAMUSCULAR; INTRAVENOUS
Status: DISCONTINUED | OUTPATIENT
Start: 2024-01-12 | End: 2024-01-12 | Stop reason: HOSPADM

## 2024-01-12 RX ORDER — ACETAMINOPHEN 325 MG/1
650 TABLET ORAL EVERY 4 HOURS PRN
Status: DISCONTINUED | OUTPATIENT
Start: 2024-01-12 | End: 2024-01-13 | Stop reason: HOSPADM

## 2024-01-12 RX ORDER — HEPARIN SODIUM 1000 [USP'U]/ML
INJECTION, SOLUTION INTRAVENOUS; SUBCUTANEOUS
Status: DISCONTINUED | OUTPATIENT
Start: 2024-01-12 | End: 2024-01-12 | Stop reason: HOSPADM

## 2024-01-12 RX ORDER — ASPIRIN 81 MG/1
TABLET, CHEWABLE ORAL
Status: DISCONTINUED | OUTPATIENT
Start: 2024-01-12 | End: 2024-01-12 | Stop reason: HOSPADM

## 2024-01-12 RX ORDER — MIDAZOLAM HYDROCHLORIDE 2 MG/2ML
INJECTION, SOLUTION INTRAMUSCULAR; INTRAVENOUS
Status: DISCONTINUED | OUTPATIENT
Start: 2024-01-12 | End: 2024-01-12 | Stop reason: HOSPADM

## 2024-01-12 RX ORDER — CARVEDILOL 6.25 MG/1
6.25 TABLET ORAL 2 TIMES DAILY WITH MEALS
Status: DISCONTINUED | OUTPATIENT
Start: 2024-01-12 | End: 2024-01-13 | Stop reason: HOSPADM

## 2024-01-12 RX ADMIN — Medication 10 ML: at 21:54

## 2024-01-12 RX ADMIN — SODIUM CHLORIDE 125 ML/HR: 9 INJECTION, SOLUTION INTRAVENOUS at 13:21

## 2024-01-12 RX ADMIN — LEVOTHYROXINE SODIUM 75 MCG: 75 TABLET ORAL at 08:04

## 2024-01-12 RX ADMIN — LOSARTAN POTASSIUM 100 MG: 50 TABLET, FILM COATED ORAL at 08:04

## 2024-01-12 RX ADMIN — RANOLAZINE 500 MG: 500 TABLET, FILM COATED, EXTENDED RELEASE ORAL at 21:53

## 2024-01-12 RX ADMIN — RANOLAZINE 500 MG: 500 TABLET, FILM COATED, EXTENDED RELEASE ORAL at 00:11

## 2024-01-12 RX ADMIN — TICAGRELOR 90 MG: 90 TABLET ORAL at 21:53

## 2024-01-12 RX ADMIN — CARVEDILOL 6.25 MG: 6.25 TABLET, FILM COATED ORAL at 18:53

## 2024-01-12 RX ADMIN — HEPARIN SODIUM 5000 UNITS: 5000 INJECTION INTRAVENOUS; SUBCUTANEOUS at 06:27

## 2024-01-12 RX ADMIN — HEPARIN SODIUM 5000 UNITS: 5000 INJECTION INTRAVENOUS; SUBCUTANEOUS at 00:12

## 2024-01-12 RX ADMIN — CARVEDILOL 6.25 MG: 6.25 TABLET, FILM COATED ORAL at 09:31

## 2024-01-12 RX ADMIN — ASPIRIN 81 MG CHEWABLE TABLET 81 MG: 81 TABLET CHEWABLE at 08:05

## 2024-01-12 RX ADMIN — RANOLAZINE 500 MG: 500 TABLET, FILM COATED, EXTENDED RELEASE ORAL at 08:05

## 2024-01-12 RX ADMIN — ATORVASTATIN CALCIUM 80 MG: 40 TABLET, FILM COATED ORAL at 08:05

## 2024-01-12 RX ADMIN — AMLODIPINE BESYLATE 5 MG: 5 TABLET ORAL at 08:05

## 2024-01-12 RX ADMIN — Medication 10 ML: at 08:06

## 2024-01-12 RX ADMIN — CETIRIZINE HYDROCHLORIDE 10 MG: 10 TABLET, FILM COATED ORAL at 08:04

## 2024-01-12 NOTE — PROGRESS NOTES
"DAILY PROGRESS NOTE  [facility]OSPITALIST [facility]      PATIENT IDENTIFICATION    Name: Yousuf Em  :  1970  MRN: [id number]    CHIEF COMPLAINT/PRINCIPAL DIAGNOSIS: Unstable angina       SUBJECTIVE    No chest pain currently. Pain dissipated on its own. No sob. Plans for [facility] today    ROS:   Gen: No fever or chills  CV: no chest pain or palpitation  Resp: no shortness of breath or cough  GI: no nausea, vomiting, diarrhea  Neuro: no headache or dizziness     OBJECTIVE     Exam:  /89   Pulse 91   Temp 97.3 °F (36.3 °C)   Resp 18   Ht 175.3 cm (69\")   Wt 110 kg (241 lb 10 oz)   SpO2 95%   BMI 35.68 kg/m²   Intake/Output last 24 hours:  No intake or output data in the 24 hours ending 24     Gen: NAD, up in bed  Resp: CTAB, no increased work of breathing  CV: RRR, no m/r/g. No peripheral edema  GI: Soft, nontender, (+) BS. nondistended  Psych: Alert and Oriented x 3, Mood and affect appropriate to situation  Skin: warm and dry on palpation. No rash on inspection.  Neuro: moves all 4 extremities, follows simple commands    DATA REVIEW:  Lab Results (last 24 hours)       Procedure Component Value Units Date/Time    Phosphorus [[id number]]  (Normal) Collected: 24    Specimen: Blood Updated: 24     Phosphorus 3.3 mg/dL     Magnesium [[id number]]  (Normal) Collected: 24    Specimen: Blood Updated: 24     Magnesium 2.0 mg/dL     Comprehensive Metabolic Panel [[id number]] Collected: 24    Specimen: Blood Updated: 24     Glucose 88 mg/dL      BUN 13 mg/dL      Creatinine 1.20 mg/dL      Sodium 138 mmol/L      Potassium 3.7 mmol/L      Chloride 104 mmol/L      CO2 22.6 mmol/L      Calcium 8.7 mg/dL      Total Protein 6.8 g/dL      Albumin 4.0 g/dL      ALT (SGPT) 26 U/L      AST (SGOT) 17 U/L      Alkaline Phosphatase 96 U/L      Total Bilirubin 0.4 mg/dL      Globulin 2.8 gm/dL      A/G Ratio 1.4 g/dL      BUN/Creatinine Ratio 10.8     Anion Gap " 11.4 mmol/L      eGFR 72.3 mL/min/1.73     Narrative:      GFR Normal >60  Chronic Kidney Disease <60  Kidney Failure <15      Lipid Panel [[id number]]  (Abnormal) Collected: 01/12/24 0451    Specimen: Blood Updated: 01/12/24 0615     Total Cholesterol 132 mg/dL      Triglycerides 218 mg/dL      HDL Cholesterol 28 mg/dL      LDL Cholesterol  68 mg/dL      VLDL Cholesterol 36 mg/dL      LDL/HDL Ratio 2.16    Narrative:      Cholesterol Reference Ranges  (U.S. Department of Health and Human Services ATP III Classifications)    Desirable          <200 mg/dL  Borderline High    200-239 mg/dL  High Risk          >240 mg/dL      Triglyceride Reference Ranges  (U.S. Department of Health and Human Services ATP III Classifications)    Normal           <150 mg/dL  Borderline High  150-199 mg/dL  High             200-499 mg/dL  Very High        >500 mg/dL    HDL Reference Ranges  (U.S. Department of Health and Human Services ATP III Classifications)    Low     <40 mg/dl (major risk factor for CHD)  High    >60 mg/dl ('negative' risk factor for CHD)        LDL Reference Ranges  (U.S. Department of Health and Human Services ATP III Classifications)    Optimal          <100 mg/dL  Near Optimal     100-129 mg/dL  Borderline High  130-159 mg/dL  High             160-189 mg/dL  Very High        >189 mg/dL    CBC & Differential [[id number]]  (Abnormal) Collected: 01/12/24 0451    Specimen: Blood Updated: 01/12/24 0558    Narrative:      The following orders were created for panel order CBC & Differential.  Procedure                               Abnormality         Status                     ---------                               -----------         ------                     CBC Auto Differential[[id number]]        Abnormal            Final result                 Please view results for these tests on the individual orders.    CBC Auto Differential [[id number]]  (Abnormal) Collected: 01/12/24 0451    Specimen: Blood Updated: 01/12/24  0558     WBC 8.79 10*3/mm3      RBC 4.35 10*6/mm3      Hemoglobin 13.3 g/dL      Hematocrit 38.7 %      MCV 89.0 fL      MCH 30.6 pg      MCHC 34.4 g/dL      RDW 12.2 %      RDW-SD 39.8 fl      MPV 9.2 fL      Platelets 332 10*3/mm3      Neutrophil % 56.7 %      Lymphocyte % 32.0 %      Monocyte % 7.6 %      Eosinophil % 2.8 %      Basophil % 0.6 %      Immature Grans % 0.3 %      Neutrophils, Absolute 4.98 10*3/mm3      Lymphocytes, Absolute 2.81 10*3/mm3      Monocytes, Absolute 0.67 10*3/mm3      Eosinophils, Absolute 0.25 10*3/mm3      Basophils, Absolute 0.05 10*3/mm3      Immature Grans, Absolute 0.03 10*3/mm3      nRBC 0.0 /100 WBC     Hemoglobin A1c [[id number]] Collected: 01/11/24 1631    Specimen: Blood from Arm, Left Updated: 01/12/24 0002    High Sensitivity Troponin T 2Hr [[id number]] Collected: 01/11/24 1925    Specimen: Blood Updated: 01/11/24 2003     HS Troponin T <6 ng/L      Troponin T Delta --     Comment: Unable to calculate.       Narrative:      High Sensitive Troponin T Reference Range:  <14.0 ng/L- Negative Female for AMI  <22.0 ng/L- Negative Male for AMI  >=14 - Abnormal Female indicating possible myocardial injury.  >=22 - Abnormal Male indicating possible myocardial injury.   Clinicians would have to utilize clinical acumen, EKG, Troponin, and serial changes to determine if it is an Acute Myocardial Infarction or myocardial injury due to an underlying chronic condition.         Comprehensive Metabolic Panel [[id number]] Collected: 01/11/24 1720    Specimen: Blood from Arm, Left Updated: 01/11/24 1805     Glucose 82 mg/dL      BUN 12 mg/dL      Creatinine 1.24 mg/dL      Sodium 138 mmol/L      Potassium 4.0 mmol/L      Comment: Slight hemolysis detected by analyzer. Result may be falsely elevated.        Chloride 103 mmol/L      CO2 23.3 mmol/L      Calcium 9.0 mg/dL      Total Protein 7.3 g/dL      Albumin 4.3 g/dL      ALT (SGPT) 26 U/L      AST (SGOT) 20 U/L      Alkaline Phosphatase 104  U/L      Total Bilirubin 0.5 mg/dL      Globulin 3.0 gm/dL      A/G Ratio 1.4 g/dL      BUN/Creatinine Ratio 9.7     Anion Gap 11.7 mmol/L      eGFR 69.5 mL/min/1.73     Narrative:      GFR Normal >60  Chronic Kidney Disease <60  Kidney Failure <15      High Sensitivity Troponin T [[id number]]  (Normal) Collected: 01/11/24 1720    Specimen: Blood from Arm, Left Updated: 01/11/24 1803     HS Troponin T <6 ng/L     Narrative:      High Sensitive Troponin T Reference Range:  <14.0 ng/L- Negative Female for AMI  <22.0 ng/L- Negative Male for AMI  >=14 - Abnormal Female indicating possible myocardial injury.  >=22 - Abnormal Male indicating possible myocardial injury.   Clinicians would have to utilize clinical acumen, EKG, Troponin, and serial changes to determine if it is an Acute Myocardial Infarction or myocardial injury due to an underlying chronic condition.         Gatzke Draw [[id number]] Collected: 01/11/24 1631    Specimen: Blood from Arm, Left Updated: 01/11/24 1732    Narrative:      The following orders were created for panel order Gatzke Draw.  Procedure                               Abnormality         Status                     ---------                               -----------         ------                     Green Top (Gel)[[id number]]                                  Final result               Lavender Top[[id number]]                                     Final result               Gold Top - SST[[id number]]                                   Final result               Light Blue Top[[id number]]                                   Final result                 Please view results for these tests on the individual orders.    Lavender Top [[id number]] Collected: 01/11/24 1631    Specimen: Blood from Arm, Left Updated: 01/11/24 1732     Extra Tube hold for add-on     Comment: Auto resulted       Gold Top - SST [[id number]] Collected: 01/11/24 1631    Specimen: Blood from Arm, Left Updated: 01/11/24 1732     Extra  Tube Hold for add-ons.     Comment: Auto resulted.       Light Blue Top [[id number]] Collected: 01/11/24 1631    Specimen: Blood from Arm, Left Updated: 01/11/24 1732     Extra Tube Hold for add-ons.     Comment: Auto resulted       Green Top (Gel) [[id number]] Collected: 01/11/24 1631    Specimen: Blood from Arm, Left Updated: 01/11/24 1717     Extra Tube Hold for add-ons.     Comment: Auto resulted.       Magnesium [[id number]]  (Normal) Collected: 01/11/24 1631    Specimen: Blood from Arm, Left Updated: 01/11/24 1710     Magnesium 2.4 mg/dL     Lipase [[id number]]  (Normal) Collected: 01/11/24 1631    Specimen: Blood from Arm, Left Updated: 01/11/24 1705     Lipase 46 U/L     BNP [[id number]]  (Normal) Collected: 01/11/24 1631    Specimen: Blood from Arm, Left Updated: 01/11/24 1701     proBNP <36.0 pg/mL     Narrative:      This assay is used as an aid in the diagnosis of individuals suspected of having heart failure. It can be used as an aid in the diagnosis of acute decompensated heart failure (ADHF) in patients presenting with signs and symptoms of ADHF to the emergency department (ED). In addition, NT-proBNP of <300 pg/mL indicates ADHF is not likely.    Age Range Result Interpretation  NT-proBNP Concentration (pg/mL:      <50             Positive            >450                   Gray                 300-450                    Negative             <300    50-75           Positive            >900                  Gray                300-900                  Negative            <300      >75             Positive            >1800                  Gray                300-1800                  Negative            <300    CBC & Differential [[id number]] Collected: 01/11/24 1631    Specimen: Blood from Arm, Left Updated: 01/11/24 1653    Narrative:      The following orders were created for panel order CBC & Differential.  Procedure                               Abnormality         Status                     ---------                                -----------         ------                     CBC Auto Differential[[id number]]        Normal              Final result               Scan Slide[[id number]]                                                                    Please view results for these tests on the individual orders.    CBC Auto Differential [[id number]]  (Normal) Collected: 01/11/24 1631    Specimen: Blood from Arm, Left Updated: 01/11/24 1653     WBC 9.81 10*3/mm3      RBC 4.84 10*6/mm3      Hemoglobin 14.9 g/dL      Hematocrit 43.6 %      MCV 90.1 fL      MCH 30.8 pg      MCHC 34.2 g/dL      RDW 12.8 %      RDW-SD 42.0 fl      MPV 9.5 fL      Platelets 386 10*3/mm3      Neutrophil % 59.2 %      Lymphocyte % 28.4 %      Monocyte % 8.8 %      Eosinophil % 2.7 %      Basophil % 0.7 %      Immature Grans % 0.2 %      Neutrophils, Absolute 5.81 10*3/mm3      Lymphocytes, Absolute 2.79 10*3/mm3      Monocytes, Absolute 0.86 10*3/mm3      Eosinophils, Absolute 0.26 10*3/mm3      Basophils, Absolute 0.07 10*3/mm3      Immature Grans, Absolute 0.02 10*3/mm3      nRBC 0.0 /100 WBC             Imaging Results (Last 24 Hours)       Procedure Component Value Units Date/Time    XR Chest 1 View [[id number]] Collected: 01/11/24 1701     Updated: 01/11/24 1704    Narrative:      PROCEDURE: XR CHEST 1 VW     COMPARISON: [facility], , XR CHEST 1 VW, 1/09/2024, 20:36.     INDICATIONS: had heartattack a few years ago, chest pain since 2:30 this afternoon     FINDINGS:   The cardiomediastinal silhouette is within normal limits.  Pulmonary vascularity appears normal.    There is no focal airspace consolidation, pleural effusion, or pneumothorax.  There are   degenerative changes of the thoracic spine.       Impression:         1. No acute cardiopulmonary abnormality.                    LETITIA OCHOA MD         Electronically Signed and Approved By: LETITIA OCHOA MD on 1/11/2024 at 17:01                             Labs  and imaging noted above have been personally reviewed by me.    Scheduled Meds:amLODIPine, 5 mg, Oral, Daily  aspirin, 81 mg, Oral, Daily  atorvastatin, 80 mg, Oral, Daily  cetirizine, 10 mg, Oral, Daily  heparin (porcine), 5,000 Units, Subcutaneous, Q8H  levothyroxine, 75 mcg, Oral, Daily  losartan, 100 mg, Oral, Daily  ranolazine, 500 mg, Oral, BID  senna-docusate sodium, 2 tablet, Oral, BID  sodium chloride, 10 mL, Intravenous, Q12H      Continuous Infusions:   PRN Meds:  senna-docusate sodium **AND** polyethylene glycol **AND** bisacodyl **AND** bisacodyl    nitroglycerin    sodium chloride    sodium chloride    sodium chloride    ASSESSMENT/PLAN      Unstable angina    Chest pain    Chest pain  - hsTrop neg x2, proBNP normal  - CXR clear  - had LHC in 2022 with KASSIE placed at that time  - plans for LHC today  - on asa, statin, ranolazine  - resume coreg    HTN  - resume coreg, amlodipine, losartan    Hypothyroidism  - synthroid      Nutrition - NPO Diet NPO Type: Sips with Meds, Ice Chips   DVT Prophylaxis - sub-q heparin  Code Status - full   GI ppx - none  Disposition - home once cardiac workup complete       Hadley Merida MD  Hospitalist Group  1/12/2024  09:07 EST

## 2024-01-12 NOTE — PLAN OF CARE
Problem: Adult Inpatient Plan of Care  Goal: Plan of Care Review  Outcome: Ongoing, Progressing  Flowsheets (Taken 1/12/2024 0544)  Progress: no change  Plan of Care Reviewed With: patient  Outcome Evaluation: Pt arrived during shift. VSS. No chest pain or discomfort noted at this time. Patient is scheduled for a stress test today. Continue with plan of care.   Goal Outcome Evaluation:  Plan of Care Reviewed With: patient        Progress: no change  Outcome Evaluation: Pt arrived during shift. VSS. No chest pain or discomfort noted at this time. Patient is scheduled for a stress test today. Continue with plan of care.

## 2024-01-12 NOTE — H&P
[facility] HISTORY AND PHYSICAL  Date: 2024   Patient Name: Yousuf Em  : 1970  MRN: [id number]  Primary Care Physician:  Angela Smith MD  Date of admission: 2024    Subjective   Subjective     Chief Complaint: Chest pain    HPI:    Yousuf Em is a 53 y.o. male with a past medical history of hypertension, hyperlipidemia, hypothyroidism, inflammatory bowel disease, depression presented to the ED for evaluation of chest pain/tightness.  Approximately around 2:30 PM today patient noted to have some discomfort in his jaw and after few minutes he started noticing chest pain/tightness in the midsternal region radiating to the left shoulder.  It lasted for approximately 10 minutes and has improved.  Associated with mild shortness of breath.  Since then intermittently noted to have some mild chest tightness.  Patient had a similar presentation a year ago when cardiac cath showed occlusion of the OM1 requiring stenting.  Patient has been on dual antiplatelet therapy until few months ago and is currently on aspirin.  Does not smoke tobacco, drink alcohol.    In the ED, vital signs Temperature 98.4, pulse 72, respiratory 16, blood pressure 152/92 on room air saturating around 98%.  Labs showed normal troponin, normal proBNP, normal CBC, CMP.  Chest x-ray showed no acute abnormality.  EKG showed sinus rhythm with no new changes concerning for ischemic disease.  Received aspirin 325 mg in the ED. Case has been recommended by the ED physician with cardiologist on-call Dr. Cordero, recommended to admit the patient, will be doing stress test in the a.m. Patient has been admitted for further evaluation and management of chest pain/tightness.        Personal History     Past Medical History:   Diagnosis Date    ADHD (attention deficit hyperactivity disorder)     Allergic     AR (allergic rhinitis)     Arthritis     Colitis     Colon polyp     Depression     ETD (eustachian  tube dysfunction)     Forgetfulness     Hearing loss     High cholesterol     Hyperlipemia     Hypertension     Hypothyroidism 2005    Inflammatory bowel disease 2005    Labral tear of long head of biceps tendon, initial encounter 09/30/2015    Labral tear of shoulder, left, sequela 09/25/2015    Obesity 2010    Right lateral epicondylitis 10/21/2016    Seasonal allergies     Thyroid disorder          Past Surgical History:   Procedure Laterality Date    CARDIAC CATHETERIZATION N/A 5/5/2022    Procedure: Left Heart Cath;  Surgeon: Samy Kaur MD;  Location: [facility] INVASIVE LOCATION;  Service: Cardiovascular;  Laterality: N/A;    CARDIAC CATHETERIZATION N/A 5/5/2022    Procedure: Percutaneous Coronary Intervention;  Surgeon: Samy Kaur MD;  Location: [facility] INVASIVE LOCATION;  Service: Cardiovascular;  Laterality: N/A;    CHOLECYSTECTOMY  2000    COLONOSCOPY      EAR TUBES      GALLBLADDER SURGERY      OTHER SURGICAL HISTORY      JOINT SURGERY         Family History   Problem Relation Age of Onset    Arthritis Mother     Cancer Mother     Heart disease Mother     Diabetes Mother     Heart disease Father     Heart disease Sister     Stroke Maternal Grandmother          Social History     Socioeconomic History    Marital status: Single   Tobacco Use    Smoking status: Never     Passive exposure: Past (childhood)    Smokeless tobacco: Never   Vaping Use    Vaping Use: Never used   Substance and Sexual Activity    Alcohol use: Never    Drug use: Never    Sexual activity: Yes     Partners: Female     Birth control/protection: None         Home Medications:  Cyanocobalamin, Semaglutide-Weight Management, amLODIPine, aspirin, atorvastatin, carvedilol, cetirizine, desvenlafaxine, levothyroxine, losartan, nitroglycerin, and ranolazine    Allergies:  No Known Allergies    Review of Systems   All other systems reviewed and negative except as mentioned above in the HPI    Objective   Objective      Vitals:   Temp:  [98.5 °F (36.9 °C)] 98.5 °F (36.9 °C)  Heart Rate:  [72-81] 81  Resp:  [16-18] 18  BP: (110-152)/(61-92) 120/73    Physical Exam    Constitutional: Awake, alert, no acute distress   Eyes: Pupils equal, sclerae anicteric, no conjunctival injection   HENT: NCAT, mucous membranes moist   Respiratory: Clear to auscultation bilaterally, nonlabored respirations    Cardiovascular: RRR, no murmurs, rubs, or gallops, palpable pedal pulses bilaterally   Gastrointestinal: Positive bowel sounds, soft, nontender, nondistended   Musculoskeletal: No bilateral ankle edema, no clubbing or cyanosis to extremities   Neurologic: Oriented x 3,  speech clear     Result Review    Result Review:  I have personally reviewed the results from the time of this admission to 1/11/2024 22:50 EST and agree with these findings:  [x]  Laboratory  []  Microbiology  [x]  Radiology  [x]  EKG/Telemetry   []  Cardiology/Vascular   []  Pathology  []  Old records  []  Other:        Imaging Results (Last 24 Hours)       Procedure Component Value Units Date/Time    XR Chest 1 View [[id number]] Collected: 01/11/24 1701     Updated: 01/11/24 1704    Narrative:      PROCEDURE: XR CHEST 1 VW     COMPARISON: [facility], , XR CHEST 1 VW, 1/09/2024, 20:36.     INDICATIONS: had heartattack a few years ago, chest pain since 2:30 this afternoon     FINDINGS:   The cardiomediastinal silhouette is within normal limits.  Pulmonary vascularity appears normal.    There is no focal airspace consolidation, pleural effusion, or pneumothorax.  There are   degenerative changes of the thoracic spine.       Impression:         1. No acute cardiopulmonary abnormality.                    LETITIA OCHOA MD         Electronically Signed and Approved By: LETITIA OCHOA MD on 1/11/2024 at 17:01                                    Assessment & Plan   Assessment / Plan     Assessment/Plan:   Chest pain  Dyspnea with exertion since few weeks  History  of CAD s/p stent to OM1 in May 2022  Hypertension  Hyperlipidemia  Hypothyroidism  Inflammatory bowel disease  Depression      Plan  Admit to observation, telemetry  Case has been discussed by the ED physician with cardiologist on-call Dr. Cordero, recommended to admit the patient, will be doing stress test in the a.m.  Aspirin 81 mg daily  Atorvastatin  A1c, lipid panel  Heart healthy diet  N.p.o. after midnight  Resume other appropriate home medications once reconciled      DVT prophylaxis:  No DVT prophylaxis order currently exists.    CODE STATUS:    Level Of Support Discussed With: Patient  Code Status (Patient has no pulse and is not breathing): CPR (Attempt to Resuscitate)  Medical Interventions (Patient has pulse or is breathing): Full Support      Admission Status:  I believe this patient meets observation status.    Part of this note may be an electronic transcription/translation of spoken language to printed text using the Dragon Dictation System    Elisa Lopez MD

## 2024-01-12 NOTE — CONSULTS
Cardiology Consult Note  [facility] 4TH FLOOR MEDICAL TELEMETRY UNIT          Patient Identification:  Yousuf Em      [id number]  53 y.o.        male  1970       Date of Consultation: 1/12/2024    Reason for Consultation: Chest pain    PCP: Angela Smith MD  Primary cardiologist: Gil    History of Present Illness:     53-year-old white male.  He has known coronary artery disease and had PCI to a severe circumflex stenosis in May 2022.  He presents to the emergency room with several episodes of chest discomfort described as pressure, radiating to the neck and jaw it is occurred with rest and with exertion.  He has also had some numbness of his tongue occasionally.  The symptoms became more noticeable yesterday and he presented to the emergency room.  His EKG showed no acute changes and his troponin was negative.  Due to the progression of his symptoms he was kept for observation otherwise but his repeat troponin has been negative.  He reports mild twinges of chest pain overnight.  He has been compliant with his medical therapy.  He has not had any other cardiac testing since May 2022 but has been clinically stable on follow-up visits.    Past History:  Past Medical History:   Diagnosis Date    ADHD (attention deficit hyperactivity disorder) 2008    Allergic 2000    AR (allergic rhinitis)     Arthritis     Colitis     Colon polyp 2005    Depression     ETD (eustachian tube dysfunction)     Forgetfulness     Hearing loss     High cholesterol     Hyperlipemia     Hypertension     Hypothyroidism 2005    Inflammatory bowel disease 2005    Labral tear of long head of biceps tendon, initial encounter 09/30/2015    Labral tear of shoulder, left, sequela 09/25/2015    Obesity 2010    Right lateral epicondylitis 10/21/2016    Seasonal allergies     Thyroid disorder      Past Surgical History:   Procedure Laterality Date    CARDIAC CATHETERIZATION N/A 5/5/2022    Procedure: Left Heart Cath;  Surgeon:  Samy Kaur MD;  Location: [facility] INVASIVE LOCATION;  Service: Cardiovascular;  Laterality: N/A;    CARDIAC CATHETERIZATION N/A 5/5/2022    Procedure: Percutaneous Coronary Intervention;  Surgeon: Samy Kaur MD;  Location: [facility] INVASIVE LOCATION;  Service: Cardiovascular;  Laterality: N/A;    CHOLECYSTECTOMY  2000    COLONOSCOPY      EAR TUBES      GALLBLADDER SURGERY      OTHER SURGICAL HISTORY      JOINT SURGERY     No Known Allergies  Social History     Socioeconomic History    Marital status: Single   Tobacco Use    Smoking status: Never     Passive exposure: Past (childhood)    Smokeless tobacco: Never   Vaping Use    Vaping Use: Never used   Substance and Sexual Activity    Alcohol use: Never    Drug use: Never    Sexual activity: Yes     Partners: Female     Birth control/protection: None     Family History   Problem Relation Age of Onset    Arthritis Mother     Cancer Mother     Heart disease Mother     Diabetes Mother     Heart disease Father     Heart disease Sister     Stroke Maternal Grandmother      Medications:  Medications Prior to Admission   Medication Sig Dispense Refill Last Dose    amLODIPine (NORVASC) 5 MG tablet Take 1 tablet by mouth Daily. 90 tablet 1 1/11/2024    aspirin 81 MG chewable tablet Chew 1 tablet Daily.   1/11/2024    atorvastatin (LIPITOR) 80 MG tablet Take 1 tablet by mouth Daily. 90 tablet 3 1/11/2024    carvedilol (COREG) 6.25 MG tablet Take 1 tablet by mouth 2 (Two) Times a Day. 180 tablet 3 1/11/2024    cetirizine (zyrTEC) 10 MG tablet Take 1 tablet by mouth Daily.   1/11/2024    Cyanocobalamin ([employer] B12 GUMMIES PO) Take 2,000 Units by mouth 2 (Two) Times a Day. otc   1/11/2024    desvenlafaxine (PRISTIQ) 100 MG 24 hr tablet Take 1 tablet by mouth once daily 90 tablet 0 1/11/2024    levothyroxine (SYNTHROID, LEVOTHROID) 75 MCG tablet Take 1 tablet by mouth once daily for 90 days 90 tablet 0 1/11/2024    losartan (COZAAR) 100 MG tablet Take 1 tablet  "by mouth once daily 90 tablet 0 1/11/2024    nitroglycerin (NITROSTAT) 0.4 MG SL tablet Place 1 tablet under the tongue Every 5 (Five) Minutes As Needed for Chest Pain (Do not take more than 3 at one time. Go to ER or call 911 if chest pain persists). Take no more than 3 doses in 15 minutes. 30 tablet 1     ranolazine (Ranexa) 500 MG 12 hr tablet Take 1 tablet by mouth 2 (Two) Times a Day. 180 tablet 3 1/11/2024    Semaglutide-Weight Management 1.7 MG/0.75ML solution auto-injector Inject 0.75 mL under the skin into the appropriate area as directed 1 (One) Time Per Week. 6 mL 1 1/10/2024     Current medications:  amLODIPine, 5 mg, Oral, Daily  aspirin, 81 mg, Oral, Daily  atorvastatin, 80 mg, Oral, Daily  cetirizine, 10 mg, Oral, Daily  heparin (porcine), 5,000 Units, Subcutaneous, Q8H  levothyroxine, 75 mcg, Oral, Daily  losartan, 100 mg, Oral, Daily  ranolazine, 500 mg, Oral, BID  senna-docusate sodium, 2 tablet, Oral, BID  sodium chloride, 10 mL, Intravenous, Q12H      Current IV drips:       Review of Systems   Constitutional: Positive for malaise/fatigue.   HENT: Negative.     Eyes: Negative.    Cardiovascular:  Positive for chest pain.   Respiratory:  Positive for shortness of breath.    Endocrine: Negative.    Hematologic/Lymphatic: Negative.    Skin: Negative.    Musculoskeletal: Negative.    Gastrointestinal: Negative.    Genitourinary: Negative.    Neurological: Negative.    Psychiatric/Behavioral: Negative.           Physical exam:    /89   Pulse 91   Temp 97.3 °F (36.3 °C)   Resp 18   Ht 175.3 cm (69\")   Wt 110 kg (241 lb 10 oz)   SpO2 95%   BMI 35.68 kg/m²  Body mass index is 35.68 kg/m².    SpO2  Min: 95 %  Max: 98 %    General Appearance:   well developed  well nourished  HENT:   oropharynx moist  lips not cyanotic  Neck:  thyroid not enlarged  supple  Respiratory:  no respiratory distress  normal breath sounds  no rales  Cardiovascular:  no jugular venous distention  regular " "rhythm  apical impulse normal  S1 normal, S2 normal  no S3, no S4   no murmur  no rub, no thrill  carotid pulses normal; no bruit  pedal pulses normal  lower extremity edema: none    Gastrointestinal:   bowel sounds normal  non-tender  no hepatomegaly, no splenomegaly  Musculoskeletal:  no clubbing of fingers.   normocephalic, head atraumatic  Skin:   warm, dry  Neuro/Psychiatric:  judgement and insight appropriate  normal mood and affect    Cardiographics:     ECG  (personally reviewed) sinus rhythm, no acute ST changes, no change from previous tracing   Telemetry:  (personally reviewed)    ECHO:    CATH:     CARDIOLITE:      Lab Review:       Results from last 7 days   Lab Units 01/12/24  0451 01/11/24  1631 01/09/24 2011   WBC 10*3/mm3 8.79 9.81 9.03   HEMOGLOBIN g/dL 13.3 14.9 13.8   HEMATOCRIT % 38.7 43.6 39.4            Results from last 7 days   Lab Units 01/12/24  0451 01/11/24  1720 01/09/24 2011   SODIUM mmol/L 138 138 139   BUN mg/dL 13 12 12   CREATININE mg/dL 1.20 1.24 1.25   GLUCOSE mg/dL 88 82 143*      Estimated Creatinine Clearance: 87 mL/min (by C-G formula based on SCr of 1.2 mg/dL).     Results from last 7 days   Lab Units 01/12/24 0451   CHOLESTEROL mg/dL 132   HDL CHOL mg/dL 28*        Results from last 7 days   Lab Units 01/09/24 2011   INR  0.93        Lab Results   Component Value Date    TSH 2.820 12/27/2023        Lab Results   Component Value Date    HGBA1C 5.50 12/27/2023           No results found for: \"DIGOXIN\"   No components found for: \"DDIMERQUAN\"     Imaging:   XR Chest 1 View    Result Date: 1/11/2024  PROCEDURE: XR CHEST 1 VW  COMPARISON: [facility], CR, XR CHEST 1 VW, 1/09/2024, 20:36.  INDICATIONS: had heartattack a few years ago, chest pain since 2:30 this afternoon  FINDINGS:  The cardiomediastinal silhouette is within normal limits.  Pulmonary vascularity appears normal.  There is no focal airspace consolidation, pleural effusion, or pneumothorax.  There " are degenerative changes of the thoracic spine.      Impression:   1. No acute cardiopulmonary abnormality.        LETITIA OCHOA MD       Electronically Signed and Approved By: LETITIA OCHOA MD on 1/11/2024 at 17:01             XR Chest 1 View    Result Date: 1/9/2024  PROCEDURE: XR CHEST 1 VW  COMPARISON: [facility], , XR CHEST 2 VW, 10/19/2022, 18:38.  INDICATIONS: STROKE ALERT  FINDINGS:  LUNGS: Normal.  No significant pulmonary parenchymal abnormalities.  VASCULATURE: Normal.  Unremarkable pulmonary vasculature.  CARDIAC: Normal.  No cardiac silhouette abnormality or cardiomegaly.  MEDIASTINUM: Normal.  No visible mass or adenopathy.  PLEURA: Normal.  No effusion or pleural thickening.  BONES: Normal.  No fracture or visible bony lesion.  OTHER: Negative.       Impression:   1. No acute process       AUSTIN CHEEK MD       Electronically Signed and Approved By: AUSTIN CHEEK MD on 1/09/2024 at 20:59                The ASCVD Risk score (Anderson DK, et al., 2019) failed to calculate for the following reasons:    The patient has a prior MI or stroke diagnosis      Assessment:      Unstable angina    Chest pain      Initial cardiac assessment: 53-year-old white male with known coronary artery disease on appropriate secondary prevention medical therapy.  He had circumflex PCI in May 2022.  He presents again with several episodes of chest discomfort radiating to the neck and jaw, somewhat worse with exertion.  Symptoms are consistent with angina and accelerated frequency.      Recommendations:  1.  Coronary angiography is recommended due to the accelerating nature of his symptoms and high risk history.  He is already on appropriate medical therapy including antianginal therapy.  Risk and benefits were discussed with the patient and he is agreeable to proceed.  2.  Further recommendations will depend on his angiographic findings                  Yovani Cordero,  MD  1/12/2024    08:47 EST

## 2024-01-13 ENCOUNTER — READMISSION MANAGEMENT (OUTPATIENT)
Dept: CALL CENTER | Facility: HOSPITAL | Age: 54
End: 2024-01-13
Payer: COMMERCIAL

## 2024-01-13 VITALS
TEMPERATURE: 97.3 F | WEIGHT: 241.62 LBS | OXYGEN SATURATION: 95 % | BODY MASS INDEX: 35.79 KG/M2 | DIASTOLIC BLOOD PRESSURE: 82 MMHG | HEIGHT: 69 IN | RESPIRATION RATE: 18 BRPM | SYSTOLIC BLOOD PRESSURE: 120 MMHG | HEART RATE: 85 BPM

## 2024-01-13 LAB
ANION GAP SERPL CALCULATED.3IONS-SCNC: 12.1 MMOL/L (ref 5–15)
BUN SERPL-MCNC: 14 MG/DL (ref 6–20)
BUN/CREAT SERPL: 11.3 (ref 7–25)
CALCIUM SPEC-SCNC: 9.2 MG/DL (ref 8.6–10.5)
CHLORIDE SERPL-SCNC: 104 MMOL/L (ref 98–107)
CO2 SERPL-SCNC: 21.9 MMOL/L (ref 22–29)
CREAT SERPL-MCNC: 1.24 MG/DL (ref 0.76–1.27)
DEPRECATED RDW RBC AUTO: 40.8 FL (ref 37–54)
EGFRCR SERPLBLD CKD-EPI 2021: 69.5 ML/MIN/1.73
ERYTHROCYTE [DISTWIDTH] IN BLOOD BY AUTOMATED COUNT: 12.5 % (ref 12.3–15.4)
GLUCOSE SERPL-MCNC: 90 MG/DL (ref 65–99)
HCT VFR BLD AUTO: 39.1 % (ref 37.5–51)
HGB BLD-MCNC: 13.7 G/DL (ref 13–17.7)
MCH RBC QN AUTO: 31.2 PG (ref 26.6–33)
MCHC RBC AUTO-ENTMCNC: 35 G/DL (ref 31.5–35.7)
MCV RBC AUTO: 89.1 FL (ref 79–97)
PLATELET # BLD AUTO: 321 10*3/MM3 (ref 140–450)
PMV BLD AUTO: 9.2 FL (ref 6–12)
POTASSIUM SERPL-SCNC: 4.1 MMOL/L (ref 3.5–5.2)
RBC # BLD AUTO: 4.39 10*6/MM3 (ref 4.14–5.8)
SODIUM SERPL-SCNC: 138 MMOL/L (ref 136–145)
WBC NRBC COR # BLD AUTO: 9.46 10*3/MM3 (ref 3.4–10.8)

## 2024-01-13 PROCEDURE — 80048 BASIC METABOLIC PNL TOTAL CA: CPT | Performed by: INTERNAL MEDICINE

## 2024-01-13 PROCEDURE — G0378 HOSPITAL OBSERVATION PER HR: HCPCS

## 2024-01-13 PROCEDURE — 99214 OFFICE O/P EST MOD 30 MIN: CPT | Performed by: INTERNAL MEDICINE

## 2024-01-13 PROCEDURE — 25010000002 HEPARIN (PORCINE) PER 1000 UNITS: Performed by: INTERNAL MEDICINE

## 2024-01-13 PROCEDURE — 85027 COMPLETE CBC AUTOMATED: CPT | Performed by: INTERNAL MEDICINE

## 2024-01-13 PROCEDURE — 99239 HOSP IP/OBS DSCHRG MGMT >30: CPT | Performed by: INTERNAL MEDICINE

## 2024-01-13 RX ADMIN — CARVEDILOL 6.25 MG: 6.25 TABLET, FILM COATED ORAL at 08:27

## 2024-01-13 RX ADMIN — Medication 10 ML: at 08:28

## 2024-01-13 RX ADMIN — AMLODIPINE BESYLATE 5 MG: 5 TABLET ORAL at 08:27

## 2024-01-13 RX ADMIN — TICAGRELOR 90 MG: 90 TABLET ORAL at 08:27

## 2024-01-13 RX ADMIN — RANOLAZINE 500 MG: 500 TABLET, FILM COATED, EXTENDED RELEASE ORAL at 08:27

## 2024-01-13 RX ADMIN — ASPIRIN 81 MG CHEWABLE TABLET 81 MG: 81 TABLET CHEWABLE at 08:27

## 2024-01-13 RX ADMIN — ASPIRIN 81 MG CHEWABLE TABLET 81 MG: 81 TABLET CHEWABLE at 08:28

## 2024-01-13 RX ADMIN — ATORVASTATIN CALCIUM 80 MG: 40 TABLET, FILM COATED ORAL at 08:26

## 2024-01-13 RX ADMIN — LOSARTAN POTASSIUM 100 MG: 50 TABLET, FILM COATED ORAL at 08:27

## 2024-01-13 RX ADMIN — CETIRIZINE HYDROCHLORIDE 10 MG: 10 TABLET, FILM COATED ORAL at 08:27

## 2024-01-13 RX ADMIN — LEVOTHYROXINE SODIUM 75 MCG: 75 TABLET ORAL at 08:27

## 2024-01-13 RX ADMIN — HEPARIN SODIUM 5000 UNITS: 5000 INJECTION INTRAVENOUS; SUBCUTANEOUS at 06:00

## 2024-01-13 NOTE — PROGRESS NOTES
[facility]     Cardiology Progress Note    Patient Name: Yousuf Em  : 1970  MRN: [id number]  Primary Care Physician:  Angela Smith MD  Date of admission: 2024    Subjective   Subjective   Chief complaint  Chest pain    HPI:  Patient Reports no chest pain.  Overall he states he feels good.    Review of Systems   All systems were reviewed and negative except for: [place]ick review of systems negative.    Objective   Objective     Vitals:   Temp:  [97.3 °F (36.3 °C)-98.1 °F (36.7 °C)] 97.3 °F (36.3 °C)  Heart Rate:  [81-96] 85  Resp:  [17-21] 18  BP: ()/(65-92) 120/82  Flow (L/min):  [2] 2  Physical Exam   Neck: no JVD, no bruit  Lungs: clear to ausculation bilaterally.  No crackles or wheezes  CV: regular rate and rhythm, no murmur  Ext: no cyanosis, clubbing or edema.  Normal bilateral LE pulses.      Scheduled Meds:amLODIPine, 5 mg, Oral, Daily  aspirin, 81 mg, Oral, Daily  aspirin, 81 mg, Oral, Daily  atorvastatin, 80 mg, Oral, Daily  carvedilol, 6.25 mg, Oral, BID With Meals  cetirizine, 10 mg, Oral, Daily  heparin (porcine), 5,000 Units, Subcutaneous, Q8H  levothyroxine, 75 mcg, Oral, Daily  losartan, 100 mg, Oral, Daily  ranolazine, 500 mg, Oral, BID  senna-docusate sodium, 2 tablet, Oral, BID  sodium chloride, 10 mL, Intravenous, Q12H  ticagrelor, 90 mg, Oral, BID      Continuous Infusions:        Result Review    Result Review:  I have personally reviewed the results from the time of this admission to 2024 10:27 EST and agree with these findings:  [x]  Laboratory  []  Microbiology  [x]  Radiology  [x]  EKG/Telemetry   [x]  Cardiology/Vascular   []  Pathology  []  Old records  []  Other:  Most notable findings include:     CBC          2024    16:31 2024    04:51 2024    05:52   CBC   WBC 9.81  8.79  9.46    RBC 4.84  4.35  4.39    Hemoglobin 14.9  13.3  13.7    Hematocrit 43.6  38.7  39.1    MCV 90.1  89.0  89.1    MCH 30.8  30.6  31.2    MCHC 34.2  34.4  35.0     RDW 12.8  12.2  12.5    Platelets 386  332  321      CMP          1/11/2024    17:20 1/12/2024    04:51 1/13/2024    05:52   CMP   Glucose 82  88  90    BUN 12  13  14    Creatinine 1.24  1.20  1.24    EGFR 69.5  72.3  69.5    Sodium 138  138  138    Potassium 4.0  3.7  4.1    Chloride 103  104  104    Calcium 9.0  8.7  9.2    Total Protein 7.3  6.8     Albumin 4.3  4.0     Globulin 3.0  2.8     Total Bilirubin 0.5  0.4     Alkaline Phosphatase 104  96     AST (SGOT) 20  17     ALT (SGPT) 26  26     Albumin/Globulin Ratio 1.4  1.4     BUN/Creatinine Ratio 9.7  10.8  11.3    Anion Gap 11.7  11.4  12.1       CARDIAC LABS:      Lab 01/11/24  1925 01/11/24  1720 01/11/24  1631 01/09/24 2011   PROBNP  --   --  <36.0  --    HSTROP T <6 <6  --  <6   PROTIME  --   --   --  12.6   INR  --   --   --  0.93        Assessment & Plan   Assessment / Plan     Brief Patient Summary:  Yousuf Em is a 53 y.o. male who has a history of coronary artery disease status post remote left circumflex stent.  He came in with chest pain thought due to unstable angina.  His troponins were negative.    Active Hospital Problems:  Active Hospital Problems    Diagnosis     **Unstable angina     Chest pain        Assessment:  1.  Chest pain  2.  Coronary artery disease  3.  Hyperlipidemia    Plan:   1.  Patient underwent left heart cath yesterday which continue to show that this borderline LAD stenosis.  We did do IFR which was abnormal at 0.87-0.88.  He received 1 drug-eluting stent to the LAD.  He has no other new or significant blockages.  2.  Aspirin and Brilinta.  He cannot miss doses of these.  3.  Continue the Coreg, losartan and Lipitor.  4.  If patient's chest pain resolves as an outpatient they can consider a trial off the Ranexa.  5.  Patient can be discharged from cardiac standpoint.       CODE STATUS:   Level Of Support Discussed With: Patient  Code Status (Patient has no pulse and is not breathing): CPR (Attempt to  Resuscitate)  Medical Interventions (Patient has pulse or is breathing): Full Support      Electronically signed by Nader Huntley MD, 01/13/24, 10:27 AM EST.

## 2024-01-13 NOTE — NURSING NOTE
Patient npo until returning from cath lab. Started removing air at 1300. TR band is off and patient is up ad nancy. No complaints of pain. VSS. Will continue with poc.

## 2024-01-13 NOTE — PLAN OF CARE
Problem: Adult Inpatient Plan of Care  Goal: Plan of Care Review  Outcome: Met  Flowsheets (Taken 1/13/2024 1234)  Plan of Care Reviewed With: patient  Outcome Evaluation: Patient alert and oriented throughout shift. Patient on room air throughout shift with no signs of distress. No chest pain throughout shift. Patient plan of care discussed at bedside. Patient to be discharged today. Will continue with patient plan of care until discharged.   Goal Outcome Evaluation:  Plan of Care Reviewed With: patient           Outcome Evaluation: Patient alert and oriented throughout shift. Patient on room air throughout shift with no signs of distress. No chest pain throughout shift. Patient plan of care discussed at bedside. Patient to be discharged today. Will continue with patient plan of care until discharged.

## 2024-01-13 NOTE — DISCHARGE SUMMARY
Date of Admission: 1/11/2024    Date of Discharge:  1/13/2024    Length of stay:  LOS: 0 days     Admission Diagnosis:   Unstable angina [I20.0]  Chest pain [R07.9]      Discharge Diagnosis:     Unstable angina   CAD  - hs Trop neg x2, proBNP normal  - had LHC in 2022 with KASSIE placed at that time  - s/p LHC 1/12/24, which continue to show that this borderline LAD stenosis.  We did do IFR which was abnormal at 0.87-0.88.  He received 1 drug-eluting stent to the LAD.  He has no other new or significant blockages.   - patient to resume aspirin and Brilinta  - continue Ranexa, Coreg, Losartan, and Lipitor   - follow up with cardiology as instructed      HTN  - resume coreg, amlodipine, losartan     Hypothyroidism  - synthroid    Obesity  - BMI 35.6  - complicates all aspects of care  - patient is currently on semaglutide      Active Hospital Problems:    Active Hospital Problems    Diagnosis  POA    **Unstable angina [I20.0]  Yes    Chest pain [R07.9]  Yes      Resolved Hospital Problems   No resolved problems to display.       Hospital Course:  Yousuf Em is a 53 y.o. male with a past medical history of hypertension, hyperlipidemia, hypothyroidism, inflammatory bowel disease, depression presented to the ED for evaluation of chest pain/tightness.  Approximately around 2:30 PM patient noted to have some discomfort in his jaw and after few minutes he started noticing chest pain/tightness in the midsternal region radiating to the left shoulder.  It lasted for approximately 10 minutes and has improved.  Associated with mild shortness of breath.  Since then intermittently noted to have some mild chest tightness.  Patient had a similar presentation a year ago when cardiac cath showed occlusion of the OM1 requiring stenting.  Patient has been on dual antiplatelet therapy until few months ago and is currently on aspirin. Received aspirin 325 mg in the ED.   Patient was evaluated by cardiology and underwent cardiac cath as  above. He received a KASSIE to the LAD, he will be placed back on aspirin and Brilinta. HE will continue all of his other home meds. Patient denies any further chest pain and will be discharged home in stable condition.     Procedures Performed:    Procedure(s):  Left Heart Cath  -------------------       Consults:   Consults       Date and Time Order Name Status Description    1/11/2024 11:19 PM Inpatient Cardiology Consult      1/11/2024  8:43 PM Hospitalist (on-call MD unless specified)              Vital Signs:  Temp:  [97.3 °F (36.3 °C)-98.1 °F (36.7 °C)] 97.3 °F (36.3 °C)  Heart Rate:  [81-96] 85  Resp:  [17-21] 18  BP: ()/(65-90) 120/82  Body mass index is 35.68 kg/m².    Physical Exam:  Physical Exam  Vitals reviewed.   Constitutional:       General: He is not in acute distress.     Appearance: He is obese.   Cardiovascular:      Rate and Rhythm: Normal rate and regular rhythm.      Heart sounds: No murmur heard.  Pulmonary:      Effort: Pulmonary effort is normal.   Abdominal:      General: Bowel sounds are normal.      Palpations: Abdomen is soft.   Neurological:      Mental Status: He is alert.   Psychiatric:         Mood and Affect: Mood normal.         Behavior: Behavior normal.                 Pertinent Test Results:   Lab Results (last 72 hours)       Procedure Component Value Units Date/Time    Basic Metabolic Panel [[id number]]  (Abnormal) Collected: 01/13/24 0552    Specimen: Blood Updated: 01/13/24 0719     Glucose 90 mg/dL      BUN 14 mg/dL      Creatinine 1.24 mg/dL      Sodium 138 mmol/L      Potassium 4.1 mmol/L      Chloride 104 mmol/L      CO2 21.9 mmol/L      Calcium 9.2 mg/dL      BUN/Creatinine Ratio 11.3     Anion Gap 12.1 mmol/L      eGFR 69.5 mL/min/1.73     Narrative:      GFR Normal >60  Chronic Kidney Disease <60  Kidney Failure <15      CBC (No Diff) [[id number]]  (Normal) Collected: 01/13/24 0552    Specimen: Blood Updated: 01/13/24 0635     WBC 9.46 10*3/mm3      RBC 4.39  10*6/mm3      Hemoglobin 13.7 g/dL      Hematocrit 39.1 %      MCV 89.1 fL      MCH 31.2 pg      MCHC 35.0 g/dL      RDW 12.5 %      RDW-SD 40.8 fl      MPV 9.2 fL      Platelets 321 10*3/mm3     POC Activated Clotting Time [[id number]]  (Abnormal) Collected: 01/12/24 1100    Specimen: Blood Updated: 01/12/24 1107     Activated Clotting Time  266 Seconds      Comment: Serial Number: [id number]:  [id number]       Hemoglobin A1c [[id number]]  (Normal) Collected: 01/11/24 1631    Specimen: Blood from Arm, Left Updated: 01/12/24 1018     Hemoglobin A1C 5.60 %     Narrative:      Hemoglobin A1C Ranges:    Increased Risk for Diabetes  5.7% to 6.4%  Diabetes                     >= 6.5%  Diabetic Goal                < 7.0%    Phosphorus [[id number]]  (Normal) Collected: 01/12/24 0451    Specimen: Blood Updated: 01/12/24 0615     Phosphorus 3.3 mg/dL     Magnesium [[id number]]  (Normal) Collected: 01/12/24 0451    Specimen: Blood Updated: 01/12/24 0615     Magnesium 2.0 mg/dL     Comprehensive Metabolic Panel [[id number]] Collected: 01/12/24 0451    Specimen: Blood Updated: 01/12/24 0615     Glucose 88 mg/dL      BUN 13 mg/dL      Creatinine 1.20 mg/dL      Sodium 138 mmol/L      Potassium 3.7 mmol/L      Chloride 104 mmol/L      CO2 22.6 mmol/L      Calcium 8.7 mg/dL      Total Protein 6.8 g/dL      Albumin 4.0 g/dL      ALT (SGPT) 26 U/L      AST (SGOT) 17 U/L      Alkaline Phosphatase 96 U/L      Total Bilirubin 0.4 mg/dL      Globulin 2.8 gm/dL      A/G Ratio 1.4 g/dL      BUN/Creatinine Ratio 10.8     Anion Gap 11.4 mmol/L      eGFR 72.3 mL/min/1.73     Narrative:      GFR Normal >60  Chronic Kidney Disease <60  Kidney Failure <15      Lipid Panel [[id number]]  (Abnormal) Collected: 01/12/24 0451    Specimen: Blood Updated: 01/12/24 0615     Total Cholesterol 132 mg/dL      Triglycerides 218 mg/dL      HDL Cholesterol 28 mg/dL      LDL Cholesterol  68 mg/dL      VLDL Cholesterol 36 mg/dL      LDL/HDL Ratio 2.16    Narrative:       Cholesterol Reference Ranges  (U.S. Department of Health and Human Services ATP III Classifications)    Desirable          <200 mg/dL  Borderline High    200-239 mg/dL  High Risk          >240 mg/dL      Triglyceride Reference Ranges  (U.S. Department of Health and Human Services ATP III Classifications)    Normal           <150 mg/dL  Borderline High  150-199 mg/dL  High             200-499 mg/dL  Very High        >500 mg/dL    HDL Reference Ranges  (U.S. Department of Health and Human Services ATP III Classifications)    Low     <40 mg/dl (major risk factor for CHD)  High    >60 mg/dl ('negative' risk factor for CHD)        LDL Reference Ranges  (U.S. Department of Health and Human Services ATP III Classifications)    Optimal          <100 mg/dL  Near Optimal     100-129 mg/dL  Borderline High  130-159 mg/dL  High             160-189 mg/dL  Very High        >189 mg/dL    CBC & Differential [[id number]]  (Abnormal) Collected: 01/12/24 0451    Specimen: Blood Updated: 01/12/24 0558    Narrative:      The following orders were created for panel order CBC & Differential.  Procedure                               Abnormality         Status                     ---------                               -----------         ------                     CBC Auto Differential[[id number]]        Abnormal            Final result                 Please view results for these tests on the individual orders.    CBC Auto Differential [[id number]]  (Abnormal) Collected: 01/12/24 0451    Specimen: Blood Updated: 01/12/24 0558     WBC 8.79 10*3/mm3      RBC 4.35 10*6/mm3      Hemoglobin 13.3 g/dL      Hematocrit 38.7 %      MCV 89.0 fL      MCH 30.6 pg      MCHC 34.4 g/dL      RDW 12.2 %      RDW-SD 39.8 fl      MPV 9.2 fL      Platelets 332 10*3/mm3      Neutrophil % 56.7 %      Lymphocyte % 32.0 %      Monocyte % 7.6 %      Eosinophil % 2.8 %      Basophil % 0.6 %      Immature Grans % 0.3 %      Neutrophils, Absolute 4.98 10*3/mm3       Lymphocytes, Absolute 2.81 10*3/mm3      Monocytes, Absolute 0.67 10*3/mm3      Eosinophils, Absolute 0.25 10*3/mm3      Basophils, Absolute 0.05 10*3/mm3      Immature Grans, Absolute 0.03 10*3/mm3      nRBC 0.0 /100 WBC     High Sensitivity Troponin T 2Hr [[id number]] Collected: 01/11/24 1925    Specimen: Blood Updated: 01/11/24 2003     HS Troponin T <6 ng/L      Troponin T Delta --     Comment: Unable to calculate.       Narrative:      High Sensitive Troponin T Reference Range:  <14.0 ng/L- Negative Female for AMI  <22.0 ng/L- Negative Male for AMI  >=14 - Abnormal Female indicating possible myocardial injury.  >=22 - Abnormal Male indicating possible myocardial injury.   Clinicians would have to utilize clinical acumen, EKG, Troponin, and serial changes to determine if it is an Acute Myocardial Infarction or myocardial injury due to an underlying chronic condition.         Comprehensive Metabolic Panel [[id number]] Collected: 01/11/24 1720    Specimen: Blood from Arm, Left Updated: 01/11/24 1805     Glucose 82 mg/dL      BUN 12 mg/dL      Creatinine 1.24 mg/dL      Sodium 138 mmol/L      Potassium 4.0 mmol/L      Comment: Slight hemolysis detected by analyzer. Result may be falsely elevated.        Chloride 103 mmol/L      CO2 23.3 mmol/L      Calcium 9.0 mg/dL      Total Protein 7.3 g/dL      Albumin 4.3 g/dL      ALT (SGPT) 26 U/L      AST (SGOT) 20 U/L      Alkaline Phosphatase 104 U/L      Total Bilirubin 0.5 mg/dL      Globulin 3.0 gm/dL      A/G Ratio 1.4 g/dL      BUN/Creatinine Ratio 9.7     Anion Gap 11.7 mmol/L      eGFR 69.5 mL/min/1.73     Narrative:      GFR Normal >60  Chronic Kidney Disease <60  Kidney Failure <15      High Sensitivity Troponin T [[id number]]  (Normal) Collected: 01/11/24 1720    Specimen: Blood from Arm, Left Updated: 01/11/24 1803     HS Troponin T <6 ng/L     Narrative:      High Sensitive Troponin T Reference Range:  <14.0 ng/L- Negative Female for AMI  <22.0 ng/L- Negative  Male for AMI  >=14 - Abnormal Female indicating possible myocardial injury.  >=22 - Abnormal Male indicating possible myocardial injury.   Clinicians would have to utilize clinical acumen, EKG, Troponin, and serial changes to determine if it is an Acute Myocardial Infarction or myocardial injury due to an underlying chronic condition.         [place] Draw [[id number]] Collected: 01/11/24 1631    Specimen: Blood from Arm, Left Updated: 01/11/24 1732    Narrative:      The following orders were created for panel order [place] Draw.  Procedure                               Abnormality         Status                     ---------                               -----------         ------                     Green Top (Gel)[[id number]]                                  Final result               Lavender Top[[id number]]                                     Final result               Gold Top - SST[[id number]]                                   Final result               Light Blue Top[[id number]]                                   Final result                 Please view results for these tests on the individual orders.    Lavender Top [[id number]] Collected: 01/11/24 1631    Specimen: Blood from Arm, Left Updated: 01/11/24 1732     Extra Tube hold for add-on     Comment: Auto resulted       Gold Top - SST [[id number]] Collected: 01/11/24 1631    Specimen: Blood from Arm, Left Updated: 01/11/24 1732     Extra Tube Hold for add-ons.     Comment: Auto resulted.       Light Blue Top [[id number]] Collected: 01/11/24 1631    Specimen: Blood from Arm, Left Updated: 01/11/24 1732     Extra Tube Hold for add-ons.     Comment: Auto resulted       Green Top (Gel) [[id number]] Collected: 01/11/24 1631    Specimen: Blood from Arm, Left Updated: 01/11/24 1717     Extra Tube Hold for add-ons.     Comment: Auto resulted.       Magnesium [[id number]]  (Normal) Collected: 01/11/24 1631    Specimen: Blood from Arm, Left Updated: 01/11/24 1710      Magnesium 2.4 mg/dL     Lipase [[id number]]  (Normal) Collected: 01/11/24 1631    Specimen: Blood from Arm, Left Updated: 01/11/24 1705     Lipase 46 U/L     BNP [[id number]]  (Normal) Collected: 01/11/24 1631    Specimen: Blood from Arm, Left Updated: 01/11/24 1701     proBNP <36.0 pg/mL     Narrative:      This assay is used as an aid in the diagnosis of individuals suspected of having heart failure. It can be used as an aid in the diagnosis of acute decompensated heart failure (ADHF) in patients presenting with signs and symptoms of ADHF to the emergency department (ED). In addition, NT-proBNP of <300 pg/mL indicates ADHF is not likely.    Age Range Result Interpretation  NT-proBNP Concentration (pg/mL:      <50             Positive            >450                   Gray                 300-450                    Negative             <300    50-75           Positive            >900                  Gray                300-900                  Negative            <300      >75             Positive            >1800                  Gray                300-1800                  Negative            <300    CBC & Differential [[id number]] Collected: 01/11/24 1631    Specimen: Blood from Arm, Left Updated: 01/11/24 1653    Narrative:      The following orders were created for panel order CBC & Differential.  Procedure                               Abnormality         Status                     ---------                               -----------         ------                     CBC Auto Differential[[id number]]        Normal              Final result               Scan Slide[[id number]]                                                                    Please view results for these tests on the individual orders.    CBC Auto Differential [[id number]]  (Normal) Collected: 01/11/24 1631    Specimen: Blood from Arm, Left Updated: 01/11/24 1653     WBC 9.81 10*3/mm3      RBC 4.84 10*6/mm3      Hemoglobin 14.9 g/dL       Hematocrit 43.6 %      MCV 90.1 fL      MCH 30.8 pg      MCHC 34.2 g/dL      RDW 12.8 %      RDW-SD 42.0 fl      MPV 9.5 fL      Platelets 386 10*3/mm3      Neutrophil % 59.2 %      Lymphocyte % 28.4 %      Monocyte % 8.8 %      Eosinophil % 2.7 %      Basophil % 0.7 %      Immature Grans % 0.2 %      Neutrophils, Absolute 5.81 10*3/mm3      Lymphocytes, Absolute 2.79 10*3/mm3      Monocytes, Absolute 0.86 10*3/mm3      Eosinophils, Absolute 0.26 10*3/mm3      Basophils, Absolute 0.07 10*3/mm3      Immature Grans, Absolute 0.02 10*3/mm3      nRBC 0.0 /100 WBC           Imaging Results (Most Recent)       Procedure Component Value Units Date/Time    XR Chest 1 View [[id number]] Collected: 01/11/24 1701     Updated: 01/11/24 1704    Narrative:      PROCEDURE: XR CHEST 1 VW     COMPARISON: [facility], , XR CHEST 1 VW, 1/09/2024, 20:36.     INDICATIONS: had heartattack a few years ago, chest pain since 2:30 this afternoon     FINDINGS:   The cardiomediastinal silhouette is within normal limits.  Pulmonary vascularity appears normal.    There is no focal airspace consolidation, pleural effusion, or pneumothorax.  There are   degenerative changes of the thoracic spine.       Impression:         1. No acute cardiopulmonary abnormality.                    LETITIA OCHOA MD         Electronically Signed and Approved By: LETITIA OCHOA MD on 1/11/2024 at 17:01                               Results for orders placed during the hospital encounter of 05/04/22    Adult Transthoracic Echo Complete W/ Cont if Necessary Per Protocol    Interpretation Summary  · Estimated right ventricular systolic pressure from tricuspid regurgitation is normal (<35 mmHg). Calculated right ventricular systolic pressure from tricuspid regurgitation is 20 mmHg.  · Left ventricular ejection fraction appears to be 56 - 60%.  · Left ventricular diastolic function was normal.  · The right ventricular cavity is borderline dilated.  · No  evidence of significant valvular disease      Imaging Results (All)       Procedure Component Value Units Date/Time    XR Chest 1 View [[id number]] Collected: 01/11/24 1701     Updated: 01/11/24 1704    Narrative:      PROCEDURE: XR CHEST 1 VW     COMPARISON: [facility], CR, XR CHEST 1 VW, 1/09/2024, 20:36.     INDICATIONS: had heartattack a few years ago, chest pain since 2:30 this afternoon     FINDINGS:   The cardiomediastinal silhouette is within normal limits.  Pulmonary vascularity appears normal.    There is no focal airspace consolidation, pleural effusion, or pneumothorax.  There are   degenerative changes of the thoracic spine.       Impression:         1. No acute cardiopulmonary abnormality.                    LETITIA OCHOA MD         Electronically Signed and Approved By: LETITIA OCHOA MD on 1/11/2024 at 17:01                                   Discharge Disposition:  Home or Self Care    Discharge Medications:     Discharge Medications        New Medications        Instructions Start Date   ticagrelor 90 MG tablet tablet  Commonly known as: BRILINTA   90 mg, Oral, 2 Times Daily             Continue These Medications        Instructions Start Date   amLODIPine 5 MG tablet  Commonly known as: NORVASC   5 mg, Oral, Daily      aspirin 81 MG chewable tablet   81 mg, Oral, Daily      atorvastatin 80 MG tablet  Commonly known as: LIPITOR   80 mg, Oral, Daily      carvedilol 6.25 MG tablet  Commonly known as: COREG   6.25 mg, Oral, 2 Times Daily      cetirizine 10 MG tablet  Commonly known as: zyrTEC   10 mg, Oral, Daily      [employer] B12 GUMMIES PO   2,000 Units, Oral, 2 Times Daily, otc      desvenlafaxine 100 MG 24 hr tablet  Commonly known as: PRISTIQ   Oral, Daily      levothyroxine 75 MCG tablet  Commonly known as: SYNTHROID, LEVOTHROID   75 mcg, Oral, Daily      losartan 100 MG tablet  Commonly known as: COZAAR   100 mg, Oral, Daily      nitroglycerin 0.4 MG SL tablet  Commonly known as:  NITROSTAT   0.4 mg, Sublingual, Every 5 Minutes PRN, Take no more than 3 doses in 15 minutes.      ranolazine 500 MG 12 hr tablet  Commonly known as: Ranexa   500 mg, Oral, 2 Times Daily      Semaglutide-Weight Management 1.7 MG/0.75ML solution auto-injector   1.7 mg, Subcutaneous, Weekly                 Discharge Diet:   Diet Instructions       Diet: Cardiac Diets; Healthy Heart (2-3 Na+); Regular Texture (IDDSI 7); Thin (IDDSI 0)      Discharge Diet: Cardiac Diets    Cardiac Diet: Healthy Heart (2-3 Na+)    Texture: Regular Texture (IDDSI 7)    Fluid Consistency: Thin (IDDSI 0)            Activity at Discharge:   Activity Instructions       Activity as Tolerated      Follow all post cardiac cath instructions            Pre-discharge education  Cardiac    Follow-up Appointments  Future Appointments   Date Time Provider Department Center   1/17/2024  9:00 AM Arpita Gerber APRN [facility]   3/27/2024  9:15 AM Angela Smith MD [facility]   4/19/2024 11:30 AM RISA Cordero MD [facility]IX[facility]       Additional Instructions for the Follow-ups that You Need to Schedule       Discharge Follow-up with Specified Provider: cardiology as instructed   As directed      To: cardiology as instructed                Test Results Pending at Discharge:      Condition on Discharge:    Stable      Risk for Readmission (LACE): Score: 3 (1/13/2024  6:00 AM)          Ander Mcdonough DO  01/13/24  12:43 EST    Time: Discharge 32 min with face-to-face history/exam, writing all prescriptions, and documenting discharge data including care coordination            Note disclaimer: At [facility], we believe that sharing information builds trust and better relationships. You are receiving this note because you recently visited [facility]. It is possible you will see health information before a provider has talked with you about it. This kind of information can be easy to misunderstand. To help you fully understand  what it means for your health, we urge you to discuss this note with your provider.

## 2024-01-14 NOTE — OUTREACH NOTE
Prep Survey      Flowsheet Row Responses   [facility] patient discharged from? [facility]   Is LACE score < 7 ? Yes   Eligibility [facility]   Date of Admission 01/11/24   Date of Discharge 01/13/24   Discharge Disposition Home or Self Care   Discharge diagnosis Unstable angina   Does the patient have one of the following disease processes/diagnoses(primary or secondary)? Other   Does the patient have Home health ordered? No   Is there a DME ordered? No   Prep survey completed? Yes            LEA PAZ - Registered Nurse

## 2024-01-15 ENCOUNTER — TRANSITIONAL CARE MANAGEMENT TELEPHONE ENCOUNTER (OUTPATIENT)
Dept: CALL CENTER | Facility: HOSPITAL | Age: 54
End: 2024-01-15
Payer: COMMERCIAL

## 2024-01-15 NOTE — OUTREACH NOTE
Call Center TCM Note      Flowsheet Row Responses   [facility] patient discharged from? [facility]   Does the patient have one of the following disease processes/diagnoses(primary or secondary)? Other   TCM attempt successful? No  [verbal release ]   Unsuccessful attempts Attempt 1   Call Status Left message            Becky Perkins RN    1/15/2024, 08:45 EST

## 2024-01-15 NOTE — OUTREACH NOTE
Call Center TCM Note      Flowsheet Row Responses   [facility] patient discharged from? [facility]   Does the patient have one of the following disease processes/diagnoses(primary or secondary)? Other   TCM attempt successful? Yes   Call start time 1524   Call end time 1526   Discharge diagnosis Unstable angina   Medication alerts for this patient Brilinta--bleeding precautions advised   Meds reviewed with patient/caregiver? Yes   Is the patient having any side effects they believe may be caused by any medication additions or changes? No   Is the patient taking all medications as directed (includes completed medication regime)? Yes   Comments Hospital f/u on 1/17/24@0900am   Does the patient have an appointment with their PCP within 7-14 days of discharge? Yes   Has home health visited the patient within 72 hours of discharge? N/A   Psychosocial issues? No   Comments Checking with his insurance regarding cardiac rehab   Did the patient receive a copy of their discharge instructions? Yes   Nursing interventions Reviewed instructions with patient   What is the patient's perception of their health status since discharge? Improving  [Pt doing well--denies any immediate needs at time of call.  Aware to monitor for cardiac related s/s.  TCM appt scheduled.]   Is the patient/caregiver able to teach back signs and symptoms related to disease process for when to call PCP? Yes   Is the patient/caregiver able to teach back signs and symptoms related to disease process for when to call 911? Yes   Additional teach back comments Right radial cath site w/o issues--aware of pos procedure precautions   TCM call completed? Yes   Call end time 1526            Becky Perkins RN    1/15/2024, 15:30 EST

## 2024-01-17 ENCOUNTER — OFFICE VISIT (OUTPATIENT)
Dept: CARDIOLOGY | Facility: CLINIC | Age: 54
End: 2024-01-17
Payer: COMMERCIAL

## 2024-01-17 VITALS
HEART RATE: 84 BPM | BODY MASS INDEX: 37.33 KG/M2 | DIASTOLIC BLOOD PRESSURE: 87 MMHG | HEIGHT: 69 IN | WEIGHT: 252 LBS | SYSTOLIC BLOOD PRESSURE: 139 MMHG

## 2024-01-17 DIAGNOSIS — Z95.5 PRESENCE OF STENT IN LAD CORONARY ARTERY: Primary | ICD-10-CM

## 2024-01-17 DIAGNOSIS — I25.10 CORONARY ARTERY DISEASE INVOLVING NATIVE CORONARY ARTERY OF NATIVE HEART WITHOUT ANGINA PECTORIS: ICD-10-CM

## 2024-01-17 DIAGNOSIS — I10 ESSENTIAL HYPERTENSION: ICD-10-CM

## 2024-01-17 DIAGNOSIS — E78.5 HYPERLIPIDEMIA LDL GOAL <70: ICD-10-CM

## 2024-01-17 NOTE — PROGRESS NOTES
Chief Complaint  Coronary Artery Disease, Hypertension, Hyperlipidemia, Hospital Follow Up Visit, and Chest Pain    Subjective            Yousuf Em presents to [facility] CARDIOLOGY  Coronary Artery Disease  Pertinent negatives include no chest pain. Risk factors include hyperlipidemia.   Hypertension  Pertinent negatives include no chest pain.   Hyperlipidemia  Pertinent negatives include no chest pain.   Chest Pain     His past medical history is significant for CAD and hyperlipidemia.       Yousuf is here for follow-up evaluation management of coronary artery disease with previous circumflex PCI 2022, recent presentation with angina and subsequent cath showed IFR positive LAD lesion which was stented last week.  Since hospital discharge he is doing well.  He has had some minor chest discomfort but feels better overall.  He is compliant with his medical therapy.    PMH  Past Medical History:   Diagnosis Date    ADHD (attention deficit hyperactivity disorder) 2008    Allergic 2000    AR (allergic rhinitis)     Arthritis     Colitis     Colon polyp 2005    Depression     ETD (eustachian tube dysfunction)     Forgetfulness     Hearing loss     High cholesterol     Hyperlipemia     Hypertension     Hypothyroidism 2005    Inflammatory bowel disease 2005    Labral tear of long head of biceps tendon, initial encounter 09/30/2015    Labral tear of shoulder, left, sequela 09/25/2015    Obesity 2010    Right lateral epicondylitis 10/21/2016    Seasonal allergies     Thyroid disorder          SURGICALHX  Past Surgical History:   Procedure Laterality Date    CARDIAC CATHETERIZATION N/A 5/5/2022    Procedure: Left Heart Cath;  Surgeon: Samy Kaur MD;  Location: [facility] INVASIVE LOCATION;  Service: Cardiovascular;  Laterality: N/A;    CARDIAC CATHETERIZATION N/A 5/5/2022    Procedure: Percutaneous Coronary Intervention;  Surgeon: Samy Kaur MD;  Location: [facility] INVASIVE LOCATION;   Service: Cardiovascular;  Laterality: N/A;    CARDIAC CATHETERIZATION N/A 1/12/2024    Procedure: Left Heart Cath;  Surgeon: Nader Huntley MD;  Location: [facility] INVASIVE LOCATION;  Service: Cardiology;  Laterality: N/A;    CHOLECYSTECTOMY  2000    COLONOSCOPY      EAR TUBES      GALLBLADDER SURGERY      OTHER SURGICAL HISTORY      JOINT SURGERY        SOC  Social History     Socioeconomic History    Marital status: Single   Tobacco Use    Smoking status: Never     Passive exposure: Past (childhood)    Smokeless tobacco: Never   Vaping Use    Vaping Use: Never used   Substance and Sexual Activity    Alcohol use: Never    Drug use: Never    Sexual activity: Yes     Partners: Female     Birth control/protection: None         FAMHX  Family History   Problem Relation Age of Onset    Arthritis Mother     Cancer Mother     Heart disease Mother     Diabetes Mother     Heart disease Father     Heart disease Sister     Stroke Maternal Grandmother           ALLERGY  No Known Allergies     MEDSCURRENT    Current Outpatient Medications:     amLODIPine (NORVASC) 5 MG tablet, Take 1 tablet by mouth Daily., Disp: 90 tablet, Rfl: 1    aspirin 81 MG chewable tablet, Chew 1 tablet Daily., Disp: , Rfl:     atorvastatin (LIPITOR) 80 MG tablet, Take 1 tablet by mouth Daily., Disp: 90 tablet, Rfl: 3    carvedilol (COREG) 6.25 MG tablet, Take 1 tablet by mouth 2 (Two) Times a Day., Disp: 180 tablet, Rfl: 3    cetirizine (zyrTEC) 10 MG tablet, Take 1 tablet by mouth Daily., Disp: , Rfl:     Cyanocobalamin ([employer] B12 GUMMIES PO), Take 2,000 Units by mouth 2 (Two) Times a Day. otc, Disp: , Rfl:     desvenlafaxine (PRISTIQ) 100 MG 24 hr tablet, Take 1 tablet by mouth once daily, Disp: 90 tablet, Rfl: 0    levothyroxine (SYNTHROID, LEVOTHROID) 75 MCG tablet, Take 1 tablet by mouth once daily for 90 days, Disp: 90 tablet, Rfl: 0    losartan (COZAAR) 100 MG tablet, Take 1 tablet by mouth once daily, Disp: 90 tablet, Rfl: 0     "nitroglycerin (NITROSTAT) 0.4 MG SL tablet, Place 1 tablet under the tongue Every 5 (Five) Minutes As Needed for Chest Pain (Do not take more than 3 at one time. Go to ER or call 911 if chest pain persists). Take no more than 3 doses in 15 minutes., Disp: 30 tablet, Rfl: 1    ranolazine (Ranexa) 500 MG 12 hr tablet, Take 1 tablet by mouth 2 (Two) Times a Day., Disp: 180 tablet, Rfl: 3    Semaglutide-Weight Management 1.7 MG/0.75ML solution auto-injector, Inject 0.75 mL under the skin into the appropriate area as directed 1 (One) Time Per Week., Disp: 6 mL, Rfl: 1    ticagrelor (BRILINTA) 90 MG tablet tablet, Take 1 tablet by mouth 2 (Two) Times a Day., Disp: 60 tablet, Rfl: 0      Review of Systems   Cardiovascular:  Negative for chest pain and dyspnea on exertion.        Objective     /87   Pulse 84   Ht 175.3 cm (69\")   Wt 114 kg (252 lb)   BMI 37.21 kg/m²       General Appearance:   well developed  well nourished  HENT:   oropharynx moist  lips not cyanotic  Neck:  thyroid not enlarged  supple  Respiratory:  no respiratory distress  normal breath sounds  no rales  Cardiovascular:  no jugular venous distention  regular rhythm  apical impulse normal  S1 normal, S2 normal  no S3, no S4   no murmur  no rub, no thrill  carotid pulses normal; no bruit  pedal pulses normal  lower extremity edema: none    Musculoskeletal:  no clubbing of fingers.   normocephalic, head atraumatic  Skin:   warm, dry  Psychiatric:  judgement and insight appropriate  normal mood and affect      Result Review :     The following data was reviewed by: Yovani Cordero MD on 01/17/2024:    CMP          1/11/2024    17:20 1/12/2024    04:51 1/13/2024    05:52   CMP   Glucose 82  88  90    BUN 12  13  14    Creatinine 1.24  1.20  1.24    EGFR 69.5  72.3  69.5    Sodium 138  138  138    Potassium 4.0  3.7  4.1    Chloride 103  104  104    Calcium 9.0  8.7  9.2    Total Protein 7.3  6.8     Albumin 4.3  4.0     Globulin 3.0  " 2.8     Total Bilirubin 0.5  0.4     Alkaline Phosphatase 104  96     AST (SGOT) 20  17     ALT (SGPT) 26  26     Albumin/Globulin Ratio 1.4  1.4     BUN/Creatinine Ratio 9.7  10.8  11.3    Anion Gap 11.7  11.4  12.1      CBC          1/11/2024    16:31 1/12/2024    04:51 1/13/2024    05:52   CBC   WBC 9.81  8.79  9.46    RBC 4.84  4.35  4.39    Hemoglobin 14.9  13.3  13.7    Hematocrit 43.6  38.7  39.1    MCV 90.1  89.0  89.1    MCH 30.8  30.6  31.2    MCHC 34.2  34.4  35.0    RDW 12.8  12.2  12.5    Platelets 386  332  321      Lipid Panel          3/20/2023    09:24 12/27/2023    09:51 1/12/2024    04:51   Lipid Panel   Total Cholesterol 132  147  132    Triglycerides 154  141  218    HDL Cholesterol 31  37  28    VLDL Cholesterol 27  25  36    LDL Cholesterol  74  85  68    LDL/HDL Ratio 2.26  2.21  2.16      TSH          3/20/2023    09:24 12/27/2023    09:51   TSH   TSH 1.640  2.820        Data reviewed : Hospital records reviewed      Procedures           Assessment and Plan        ASSESSMENT:  Encounter Diagnoses   Name Primary?    Presence of stent in LAD coronary artery Yes    Coronary artery disease involving native coronary artery of native heart without angina pectoris     Essential hypertension     Hyperlipidemia LDL goal <70          PLAN:    1.  Coronary artery disease, recent LAD PCI-symptomatically he has improved.  Continue dual antiplatelet therapy and statin therapy  2.  Refer to cardiac rehab  3.  Essential hypertension-controlled, continue current medical therapy  4.  Mixed hyperlipidemia, LDL is less than 70 currently, continue statin therapy    Follow-up in about 6 months otherwise          Patient was given instructions and counseling regarding his condition or for health maintenance advice. Please see specific information pulled into the AVS if appropriate.             RISA Cordero MD  1/17/2024    09:38 EST

## 2024-02-19 ENCOUNTER — TELEPHONE (OUTPATIENT)
Dept: INTERNAL MEDICINE | Facility: CLINIC | Age: 54
End: 2024-02-19
Payer: COMMERCIAL

## 2024-02-19 NOTE — TELEPHONE ENCOUNTER
PA approved for Wegovy. Request Reference Number: [id number]. WEGOVY INJ 1.7MG is approved through 06/19/2024. Your patient may now fill this prescription and it will be covered.. Authorization Expiration Date: June 19, 2024.    Text sent to pt by covermymeds.

## 2024-02-26 RX ORDER — DESVENLAFAXINE 100 MG/1
TABLET, EXTENDED RELEASE ORAL DAILY
Qty: 90 TABLET | Refills: 0 | Status: SHIPPED | OUTPATIENT
Start: 2024-02-26

## 2024-07-24 ENCOUNTER — PRIOR AUTHORIZATION (OUTPATIENT)
Dept: INTERNAL MEDICINE | Facility: CLINIC | Age: 54
End: 2024-07-24

## 2024-07-24 NOTE — TELEPHONE ENCOUNTER
Wegovy 1.7MG/0.75ML auto-injectors    WEGOVY INJ 1.7MG is approved through 07/24/2025. Your patient may now fill this prescription and it will be covered.. Authorization Expiration Date: July 24, 2025.

## 2024-07-25 ENCOUNTER — PATIENT MESSAGE (OUTPATIENT)
Dept: INTERNAL MEDICINE | Facility: CLINIC | Age: 54
End: 2024-07-25

## 2024-07-30 RX ORDER — SEMAGLUTIDE 0.25 MG/.5ML
0.25 INJECTION, SOLUTION SUBCUTANEOUS WEEKLY
Qty: 2.5 ML | Refills: 1 | Status: SHIPPED | OUTPATIENT
Start: 2024-07-30

## 2024-07-30 NOTE — TELEPHONE ENCOUNTER
From: Yousuf Em  To: Angela Smith  Sent: 7/25/2024 8:16 AM EDT  Subject: Wegovy Question     Good morning, Doctor Sarah,    I hope all is well with you and your family. I am writing because I have been off Wegovy for two months due to being unemployed. Now that I have new insurance, I have refilled my prescription for 1.7 injections. Is it safe to resume the prescription at this dose after a two-month break?    Additionally, I have an appointment scheduled for September 25th and would need a new prescription for Wegovy. I will run out of the medicine on the 23rd of August.    Thank you,  Yousuf Em

## 2024-07-31 NOTE — TELEPHONE ENCOUNTER
[employer] called to verify patient's new dosage. Discussed that this was sent in as lower dosage to restart, as patient had been off of the medication for some time.

## 2024-08-01 ENCOUNTER — PRIOR AUTHORIZATION (OUTPATIENT)
Dept: INTERNAL MEDICINE | Facility: CLINIC | Age: 54
End: 2024-08-01

## 2024-08-01 NOTE — TELEPHONE ENCOUNTER
Wegovy 0.25MG/0.5ML auto-injectors    WEGOVY INJ 0.25MG is approved through 08/14/2024. Your patient may now fill this prescription and it will be covered.. Authorization Expiration Date: August 14, 2024.

## 2024-08-22 ENCOUNTER — PATIENT MESSAGE (OUTPATIENT)
Dept: INTERNAL MEDICINE | Facility: CLINIC | Age: 54
End: 2024-08-22

## 2024-08-26 RX ORDER — SEMAGLUTIDE 0.5 MG/.5ML
0.5 INJECTION, SOLUTION SUBCUTANEOUS WEEKLY
Qty: 2 ML | Refills: 2 | Status: SHIPPED | OUTPATIENT
Start: 2024-08-26

## 2024-08-26 NOTE — TELEPHONE ENCOUNTER
From: Yousuf Em  To: Angela Smith  Sent: 8/22/2024 6:13 PM EDT  Subject: An increase of Wegovy    You had mentioned that you would increase the dosage of the Wegovy. I have one more shot that will make four weeks. Would you please increase the dosage to .5?

## 2024-09-03 ENCOUNTER — OFFICE VISIT (OUTPATIENT)
Dept: CARDIOLOGY | Facility: CLINIC | Age: 54
End: 2024-09-03
Payer: COMMERCIAL

## 2024-09-03 VITALS
WEIGHT: 262 LBS | BODY MASS INDEX: 38.8 KG/M2 | DIASTOLIC BLOOD PRESSURE: 92 MMHG | SYSTOLIC BLOOD PRESSURE: 145 MMHG | HEIGHT: 69 IN | HEART RATE: 65 BPM

## 2024-09-03 DIAGNOSIS — I25.10 CORONARY ARTERY DISEASE INVOLVING NATIVE CORONARY ARTERY OF NATIVE HEART WITHOUT ANGINA PECTORIS: Primary | ICD-10-CM

## 2024-09-03 DIAGNOSIS — E78.5 HYPERLIPIDEMIA LDL GOAL <70: ICD-10-CM

## 2024-09-03 DIAGNOSIS — I10 ESSENTIAL HYPERTENSION: ICD-10-CM

## 2024-09-03 PROCEDURE — 99214 OFFICE O/P EST MOD 30 MIN: CPT | Performed by: NURSE PRACTITIONER

## 2024-09-03 RX ORDER — CLOPIDOGREL BISULFATE 75 MG/1
75 TABLET ORAL DAILY
COMMUNITY

## 2024-09-03 RX ORDER — BUPROPION HYDROCHLORIDE 300 MG/1
300 TABLET ORAL DAILY
COMMUNITY

## 2024-09-03 NOTE — PROGRESS NOTES
Chief Complaint  Coronary Artery Disease    Subjective            Yousuf Em presents to [facility] CARDIOLOGY  History of Present Illness    Yousuf is here for follow-up valuation management of coronary artery disease with previous circumflex PCI 2022, LAD PCI early this year, essential hypertension, mixed hyperlipidemia.  Since his last visit he is doing well.  He denies chest pain, excessive shortness of breath, or palpitations.  He does have some mild shortness of breath when laying on his left side but no other complaints.  Compliant with medical therapy.    PMH  Past Medical History:   Diagnosis Date    ADHD (attention deficit hyperactivity disorder) 2008    Allergic 2000    AR (allergic rhinitis)     Arthritis     Colitis     Colon polyp 2005    Depression     ETD (eustachian tube dysfunction)     Forgetfulness     Hearing loss     High cholesterol     Hyperlipemia     Hypertension     Hypothyroidism 2005    Inflammatory bowel disease 2005    Labral tear of long head of biceps tendon, initial encounter 09/30/2015    Labral tear of shoulder, left, sequela 09/25/2015    Obesity 2010    Right lateral epicondylitis 10/21/2016    Seasonal allergies     Thyroid disorder          SURGICALHX  Past Surgical History:   Procedure Laterality Date    CARDIAC CATHETERIZATION N/A 5/5/2022    Procedure: Left Heart Cath;  Surgeon: Samy Kaur MD;  Location: [facility] CATH INVASIVE LOCATION;  Service: Cardiovascular;  Laterality: N/A;    CARDIAC CATHETERIZATION N/A 5/5/2022    Procedure: Percutaneous Coronary Intervention;  Surgeon: Samy Kaur MD;  Location: [facility] INVASIVE LOCATION;  Service: Cardiovascular;  Laterality: N/A;    CARDIAC CATHETERIZATION N/A 1/12/2024    Procedure: Left Heart Cath;  Surgeon: Nader Huntley MD;  Location: [facility] CATH INVASIVE LOCATION;  Service: Cardiology;  Laterality: N/A;    CHOLECYSTECTOMY  2000    COLONOSCOPY      EAR TUBES      GALLBLADDER SURGERY       OTHER SURGICAL HISTORY      JOINT SURGERY        SOC  Social History     Socioeconomic History    Marital status: Single   Tobacco Use    Smoking status: Never     Passive exposure: Past (childhood)    Smokeless tobacco: Never   Vaping Use    Vaping status: Never Used   Substance and Sexual Activity    Alcohol use: Never    Drug use: Never    Sexual activity: Yes     Partners: Female     Birth control/protection: None         FAMHX  Family History   Problem Relation Age of Onset    Arthritis Mother     Cancer Mother     Heart disease Mother     Diabetes Mother     Heart disease Father     Heart disease Sister     Stroke Maternal Grandmother           ALLERGY  No Known Allergies     MEDSCURRENT    Current Outpatient Medications:     amLODIPine (NORVASC) 5 MG tablet, Take 1 tablet by mouth Daily., Disp: 90 tablet, Rfl: 1    aspirin 81 MG chewable tablet, Chew 1 tablet Daily., Disp: , Rfl:     atorvastatin (LIPITOR) 80 MG tablet, Take 1 tablet by mouth Daily., Disp: 90 tablet, Rfl: 3    buPROPion XL (Wellbutrin XL) 300 MG 24 hr tablet, Take 1 tablet by mouth Daily., Disp: , Rfl:     carvedilol (COREG) 6.25 MG tablet, Take 1 tablet by mouth 2 (Two) Times a Day., Disp: 180 tablet, Rfl: 3    cetirizine (zyrTEC) 10 MG tablet, Take 1 tablet by mouth Daily., Disp: , Rfl:     clopidogrel (PLAVIX) 75 MG tablet, Take 1 tablet by mouth Daily., Disp: , Rfl:     desvenlafaxine (PRISTIQ) 100 MG 24 hr tablet, Take 1 tablet by mouth once daily, Disp: 90 tablet, Rfl: 0    levothyroxine (SYNTHROID, LEVOTHROID) 75 MCG tablet, Take 1 tablet by mouth once daily for 90 days, Disp: 90 tablet, Rfl: 0    losartan (COZAAR) 100 MG tablet, Take 1 tablet by mouth once daily, Disp: 90 tablet, Rfl: 0    nitroglycerin (NITROSTAT) 0.4 MG SL tablet, Place 1 tablet under the tongue Every 5 (Five) Minutes As Needed for Chest Pain (Do not take more than 3 at one time. Go to ER or call 911 if chest pain persists). Take no more than 3 doses in 15  "minutes., Disp: 30 tablet, Rfl: 1    Semaglutide-Weight Management (Wegovy) 0.5 MG/0.5ML solution auto-injector, Inject 0.5 mL under the skin into the appropriate area as directed 1 (One) Time Per Week., Disp: 2 mL, Rfl: 2      Review of Systems   Cardiovascular:  Negative for chest pain, dyspnea on exertion and palpitations.        Objective     /92   Pulse 65   Ht 175.3 cm (69\")   Wt 119 kg (262 lb)   BMI 38.69 kg/m²       General Appearance:   well developed  well nourished  HENT:   oropharynx moist  lips not cyanotic  Neck:  thyroid not enlarged  supple  Respiratory:  no respiratory distress  normal breath sounds  no rales  Cardiovascular:  no jugular venous distention  regular rhythm  apical impulse normal  S1 normal, S2 normal  no S3, no S4   no murmur  no rub, no thrill  carotid pulses normal; no bruit  pedal pulses normal  lower extremity edema: none    Musculoskeletal:  no clubbing of fingers.   normocephalic, head atraumatic  Skin:   warm, dry  Psychiatric:  judgement and insight appropriate  normal mood and affect      Result Review :     The following data was reviewed by: AKI Ferguson on 09/03/2024:    CMP          1/11/2024    17:20 1/12/2024    04:51 1/13/2024    05:52   CMP   Glucose 82  88  90    BUN 12  13  14    Creatinine 1.24  1.20  1.24    EGFR 69.5  72.3  69.5    Sodium 138  138  138    Potassium 4.0  3.7  4.1    Chloride 103  104  104    Calcium 9.0  8.7  9.2    Total Protein 7.3  6.8     Albumin 4.3  4.0     Globulin 3.0  2.8     Total Bilirubin 0.5  0.4     Alkaline Phosphatase 104  96     AST (SGOT) 20  17     ALT (SGPT) 26  26     Albumin/Globulin Ratio 1.4  1.4     BUN/Creatinine Ratio 9.7  10.8  11.3    Anion Gap 11.7  11.4  12.1      CBC          1/11/2024    16:31 1/12/2024    04:51 1/13/2024    05:52   CBC   WBC 9.81  8.79  9.46    RBC 4.84  4.35  4.39    Hemoglobin 14.9  13.3  13.7    Hematocrit 43.6  38.7  39.1    MCV 90.1  89.0  89.1    MCH 30.8  30.6  " 31.2    MCHC 34.2  34.4  35.0    RDW 12.8  12.2  12.5    Platelets 386  332  321      Lipid Panel          12/27/2023    09:51 1/12/2024    04:51   Lipid Panel   Total Cholesterol 147  132    Triglycerides 141  218    HDL Cholesterol 37  28    VLDL Cholesterol 25  36    LDL Cholesterol  85  68    LDL/HDL Ratio 2.21  2.16      TSH          12/27/2023    09:51   TSH   TSH 2.820             Procedures      Yousuf Em  reports that he has never smoked. He has been exposed to tobacco smoke. He has never used smokeless tobacco. I have educated him on the risk of diseases from using tobacco products such as cancer, COPD, and heart disease.                Assessment and Plan        ASSESSMENT:  Encounter Diagnoses   Name Primary?    Coronary artery disease involving native coronary artery of native heart without angina pectoris Yes    Essential hypertension     Hyperlipidemia LDL goal <70          PLAN:    1.  Coronary artery disease, recent LAD PCI.  Clinically stable, no angina.  Continue current medical therapy.  2.  Essential hypertension, mildly elevated today.  I have asked him to check his blood pressure over the next week and send me a [employer] message.  Will increase Norvasc if averaging greater than 140/90.  3.  Mixed hyperlipidemia, stable on statin therapy, continue.  4.  [facility], he is going to have those records sent to our office.  Will call him once we receive those.    Routine follow-up arranged.      Patient was given instructions and counseling regarding his condition or for health maintenance advice. Please see specific information pulled into the AVS if appropriate.           Di Lama, APRN   9/3/2024  15:05 EDT

## 2024-09-25 ENCOUNTER — OFFICE VISIT (OUTPATIENT)
Dept: INTERNAL MEDICINE | Facility: CLINIC | Age: 54
End: 2024-09-25
Payer: COMMERCIAL

## 2024-09-25 VITALS
TEMPERATURE: 97.2 F | SYSTOLIC BLOOD PRESSURE: 132 MMHG | BODY MASS INDEX: 38 KG/M2 | DIASTOLIC BLOOD PRESSURE: 84 MMHG | OXYGEN SATURATION: 95 % | HEIGHT: 69 IN | RESPIRATION RATE: 18 BRPM | WEIGHT: 256.6 LBS | HEART RATE: 73 BPM

## 2024-09-25 DIAGNOSIS — Z12.5 PROSTATE CANCER SCREENING: ICD-10-CM

## 2024-09-25 DIAGNOSIS — N52.9 ERECTILE DYSFUNCTION, UNSPECIFIED ERECTILE DYSFUNCTION TYPE: ICD-10-CM

## 2024-09-25 DIAGNOSIS — E78.2 MIXED HYPERLIPIDEMIA: ICD-10-CM

## 2024-09-25 DIAGNOSIS — F33.42 RECURRENT MAJOR DEPRESSIVE DISORDER, IN FULL REMISSION: ICD-10-CM

## 2024-09-25 DIAGNOSIS — I10 PRIMARY HYPERTENSION: ICD-10-CM

## 2024-09-25 DIAGNOSIS — Z00.00 ANNUAL PHYSICAL EXAM: Primary | ICD-10-CM

## 2024-09-25 DIAGNOSIS — R41.3 MEMORY DEFICIT: ICD-10-CM

## 2024-09-25 DIAGNOSIS — E06.3 HYPOTHYROIDISM DUE TO HASHIMOTO'S THYROIDITIS: ICD-10-CM

## 2024-09-25 LAB
25(OH)D3 SERPL-MCNC: 30.4 NG/ML (ref 30–100)
ALBUMIN SERPL-MCNC: 4.5 G/DL (ref 3.5–5.2)
ALBUMIN/GLOB SERPL: 1.4 G/DL
ALP SERPL-CCNC: 113 U/L (ref 39–117)
ALT SERPL W P-5'-P-CCNC: 21 U/L (ref 1–41)
ANION GAP SERPL CALCULATED.3IONS-SCNC: 13.1 MMOL/L (ref 5–15)
AST SERPL-CCNC: 20 U/L (ref 1–40)
BASOPHILS # BLD AUTO: 0.05 10*3/MM3 (ref 0–0.2)
BASOPHILS NFR BLD AUTO: 0.6 % (ref 0–1.5)
BILIRUB SERPL-MCNC: 0.4 MG/DL (ref 0–1.2)
BUN SERPL-MCNC: 16 MG/DL (ref 6–20)
BUN/CREAT SERPL: 11 (ref 7–25)
CALCIUM SPEC-SCNC: 10.1 MG/DL (ref 8.6–10.5)
CHLORIDE SERPL-SCNC: 101 MMOL/L (ref 98–107)
CHOLEST SERPL-MCNC: 115 MG/DL (ref 0–200)
CO2 SERPL-SCNC: 26.9 MMOL/L (ref 22–29)
CREAT SERPL-MCNC: 1.46 MG/DL (ref 0.76–1.27)
DEPRECATED RDW RBC AUTO: 44.3 FL (ref 37–54)
EGFRCR SERPLBLD CKD-EPI 2021: 56.8 ML/MIN/1.73
EOSINOPHIL # BLD AUTO: 0.2 10*3/MM3 (ref 0–0.4)
EOSINOPHIL NFR BLD AUTO: 2.4 % (ref 0.3–6.2)
ERYTHROCYTE [DISTWIDTH] IN BLOOD BY AUTOMATED COUNT: 12.9 % (ref 12.3–15.4)
FOLATE SERPL-MCNC: 4.89 NG/ML (ref 4.78–24.2)
GLOBULIN UR ELPH-MCNC: 3.2 GM/DL
GLUCOSE SERPL-MCNC: 86 MG/DL (ref 65–99)
HCT VFR BLD AUTO: 43.3 % (ref 37.5–51)
HDLC SERPL-MCNC: 31 MG/DL (ref 40–60)
HGB BLD-MCNC: 14.5 G/DL (ref 13–17.7)
IMM GRANULOCYTES # BLD AUTO: 0.03 10*3/MM3 (ref 0–0.05)
IMM GRANULOCYTES NFR BLD AUTO: 0.4 % (ref 0–0.5)
LDLC SERPL CALC-MCNC: 52 MG/DL (ref 0–100)
LDLC/HDLC SERPL: 1.44 {RATIO}
LYMPHOCYTES # BLD AUTO: 2.28 10*3/MM3 (ref 0.7–3.1)
LYMPHOCYTES NFR BLD AUTO: 27.6 % (ref 19.6–45.3)
MCH RBC QN AUTO: 31.3 PG (ref 26.6–33)
MCHC RBC AUTO-ENTMCNC: 33.5 G/DL (ref 31.5–35.7)
MCV RBC AUTO: 93.3 FL (ref 79–97)
MONOCYTES # BLD AUTO: 0.61 10*3/MM3 (ref 0.1–0.9)
MONOCYTES NFR BLD AUTO: 7.4 % (ref 5–12)
NEUTROPHILS NFR BLD AUTO: 5.09 10*3/MM3 (ref 1.7–7)
NEUTROPHILS NFR BLD AUTO: 61.6 % (ref 42.7–76)
NRBC BLD AUTO-RTO: 0 /100 WBC (ref 0–0.2)
PLATELET # BLD AUTO: 362 10*3/MM3 (ref 140–450)
PMV BLD AUTO: 9.6 FL (ref 6–12)
POTASSIUM SERPL-SCNC: 3.8 MMOL/L (ref 3.5–5.2)
PROT SERPL-MCNC: 7.7 G/DL (ref 6–8.5)
PSA SERPL-MCNC: 0.93 NG/ML (ref 0–4)
RBC # BLD AUTO: 4.64 10*6/MM3 (ref 4.14–5.8)
RPR SER QL: NORMAL
SODIUM SERPL-SCNC: 141 MMOL/L (ref 136–145)
TRIGL SERPL-MCNC: 197 MG/DL (ref 0–150)
TSH SERPL DL<=0.05 MIU/L-ACNC: 1.72 UIU/ML (ref 0.27–4.2)
VIT B12 BLD-MCNC: 1047 PG/ML (ref 211–946)
VLDLC SERPL-MCNC: 32 MG/DL (ref 5–40)
WBC NRBC COR # BLD AUTO: 8.26 10*3/MM3 (ref 3.4–10.8)

## 2024-09-25 PROCEDURE — 84403 ASSAY OF TOTAL TESTOSTERONE: CPT | Performed by: INTERNAL MEDICINE

## 2024-09-25 PROCEDURE — 82306 VITAMIN D 25 HYDROXY: CPT | Performed by: INTERNAL MEDICINE

## 2024-09-25 PROCEDURE — 84402 ASSAY OF FREE TESTOSTERONE: CPT | Performed by: INTERNAL MEDICINE

## 2024-09-25 PROCEDURE — 85025 COMPLETE CBC W/AUTO DIFF WBC: CPT | Performed by: INTERNAL MEDICINE

## 2024-09-25 PROCEDURE — 80053 COMPREHEN METABOLIC PANEL: CPT | Performed by: INTERNAL MEDICINE

## 2024-09-25 PROCEDURE — 82746 ASSAY OF FOLIC ACID SERUM: CPT | Performed by: INTERNAL MEDICINE

## 2024-09-25 PROCEDURE — 86592 SYPHILIS TEST NON-TREP QUAL: CPT | Performed by: INTERNAL MEDICINE

## 2024-09-25 PROCEDURE — 80061 LIPID PANEL: CPT | Performed by: INTERNAL MEDICINE

## 2024-09-25 PROCEDURE — G0103 PSA SCREENING: HCPCS | Performed by: INTERNAL MEDICINE

## 2024-09-25 PROCEDURE — 82607 VITAMIN B-12: CPT | Performed by: INTERNAL MEDICINE

## 2024-09-25 PROCEDURE — 84443 ASSAY THYROID STIM HORMONE: CPT | Performed by: INTERNAL MEDICINE

## 2024-09-25 RX ORDER — SEMAGLUTIDE 1 MG/.5ML
1 INJECTION, SOLUTION SUBCUTANEOUS WEEKLY
Qty: 2 ML | Refills: 1 | Status: SHIPPED | OUTPATIENT
Start: 2024-09-25

## 2024-09-25 RX ORDER — DESVENLAFAXINE 50 MG/1
50 TABLET, FILM COATED, EXTENDED RELEASE ORAL DAILY
Qty: 90 TABLET | Refills: 1 | Status: SHIPPED | OUTPATIENT
Start: 2024-09-25

## 2024-09-25 NOTE — PROGRESS NOTES
Chief Complaint  Med Management, memory concerns (Trouble with remembering and concentration), and Annual Exam      Subjective      History of Present Illness  The patient presents for evaluation of multiple medical concerns.    He reports experiencing significant memory issues, including difficulty with word recall and instances of forgetting recent conversations. These symptoms have been present for approximately 6 months. He also mentions a lack of recollection of his childhood and uncertainty about potential head injuries during his  service. He has been exposed to chemicals in the past. He is currently on Wellbutrin, [facility], and Pristiq, which he believes may be contributing to his memory issues. He has also been taking B12 supplements.    He has been experiencing erectile dysfunction, which he attributes to his medication regimen. He is interested in having his testosterone levels checked. He reports no chest pain or breathing difficulties and continues to follow up with his cardiologist.    He is currently taking Wegovy for weight management and has experienced significant weight loss without any side effects such as nausea or vomiting. He had previously discontinued the medication for a few months but has since resumed it.    [facility] due to the discovery of several polyps, which have since been removed. He uses Ex-Lax for constipation, which he finds effective, and also has MiraLAX at home.    He has had some vision issues over the last couple of years. He saw an eye doctor who thought it was just the prescription of the lens and that these new ones should do better, but it is still the same.    FAMILY HISTORY  His mother has a history of dementia.         Objective   Vital Signs:   Vitals:    09/25/24 1400   BP: 132/84   BP Location: Left arm   Patient Position: Sitting   Cuff Size: Adult   Pulse: 73   Resp: 18   Temp:  "97.2 °F (36.2 °C)   TempSrc: Temporal   SpO2: 95%   Weight: 116 kg (256 lb 9.6 oz)   Height: 175.3 cm (69.02\")     Body mass index is 37.88 kg/m².    Wt Readings from Last 3 Encounters:   09/25/24 116 kg (256 lb 9.6 oz)   09/03/24 119 kg (262 lb)   01/17/24 114 kg (252 lb)     BP Readings from Last 3 Encounters:   09/25/24 132/84   09/03/24 145/92   01/17/24 139/87       Health Maintenance   Topic Date Due    ANNUAL PHYSICAL  06/26/2024    INFLUENZA VACCINE  08/01/2024    COVID-19 Vaccine (3 - 2023-24 season) 09/26/2024 (Originally 9/1/2024)    LIPID PANEL  01/12/2025    BMI FOLLOWUP  08/26/2025    TDAP/TD VACCINES (2 - Td or Tdap) 08/03/2030    COLORECTAL CANCER SCREENING  08/10/2031    HEPATITIS C SCREENING  Completed    ZOSTER VACCINE  Completed    Pneumococcal Vaccine 0-64  Aged Out       Physical Exam  Vitals reviewed.   Constitutional:       Appearance: Normal appearance. He is well-developed.   HENT:      Head: Normocephalic and atraumatic.      Right Ear: External ear normal.      Left Ear: External ear normal.   Eyes:      Conjunctiva/sclera: Conjunctivae normal.      Pupils: Pupils are equal, round, and reactive to light.   Cardiovascular:      Rate and Rhythm: Normal rate and regular rhythm.      Heart sounds: No murmur heard.     No friction rub. No gallop.   Pulmonary:      Effort: Pulmonary effort is normal.      Breath sounds: Normal breath sounds. No wheezing or rhonchi.   Skin:     General: Skin is warm and dry.   Neurological:      Mental Status: He is alert and oriented to person, place, and time.   Psychiatric:         Mood and Affect: Affect normal.         Behavior: Behavior normal.         Thought Content: Thought content normal.        Physical Exam        Result Review :  The following data was reviewed by: Angela Smith MD on 09/25/2024:         Results  Reviewed MRI from January that did not show anything significantly concerning.            Procedures            Assessment & " Plan  Primary hypertension    Mixed hyperlipidemia     Hypothyroidism due to Hashimoto's thyroiditis    Recurrent major depressive disorder, in full remission    Memory deficit    Erectile dysfunction, unspecified erectile dysfunction type    Prostate cancer screening    Annual physical exam      Orders Placed This Encounter   Procedures    Fluzone >6mos ([id number])    Comprehensive Metabolic Panel    TSH    Lipid Panel    Vitamin D,25-Hydroxy    Vitamin B12 & Folate    RPR    Testosterone, Free, Total    PSA SCREENING    Ambulatory Referral to Neuropsychology    Ambulatory Referral to Neurology    CBC & Differential     New Medications Ordered This Visit   Medications    desvenlafaxine (PRISTIQ) 50 MG 24 hr tablet     Sig: Take 1 tablet by mouth Daily.     Dispense:  90 tablet     Refill:  1    Semaglutide-Weight Management (Wegovy) 1 MG/0.5ML solution auto-injector     Sig: Inject 0.5 mL under the skin into the appropriate area as directed 1 (One) Time Per Week.     Dispense:  2 mL     Refill:  1          Assessment & Plan  1. Memory loss.  An MRI conducted in January 2024 revealed a few minor white matter lesions, likely due to his history of high blood pressure, which has been well-managed recently. His memory loss could be attributed to low vitamin B12 or D levels, or potentially his Pristiq medication. Given his mother's history of dementia and his  exposures, there is a possibility of early-onset Alzheimer's dementia. A comprehensive panel will be conducted to investigate potential causes of his memory loss. The dosage of Pristiq will be reduced to 50 mg while maintaining Wellbutrin at 300 mg to observe if this adjustment improves his clarity. Neuropsych testing will be arranged to identify any organic causes of his memory changes. A referral to a neurologist specializing in dementia will also be made. Should he experience anxiety, depression, or PTSD flare-ups after reducing the Pristiq dosage, he is  advised to return to the 100 mg dosage.    2. Erectile dysfunction.  His erectile dysfunction is likely a side effect of his carvedilol medication. His testosterone level will be checked. He is advised to have the blood drawn fasting first thing in the morning for accurate results.    3. Weight management.  He has achieved significant weight loss with Wegovy. A higher dose of Wegovy will be prescribed. He is advised to monitor for potential side effects such as vomiting, diarrhea, and constipation.    4. Constipation.  He is advised to take MiraLAX twice a week and continue with Ex-Lax as needed.    5. Health maintenance.  A PSA test will be added to his lab work. An influenza vaccine will be administered today.  Discussed the importance of good self-care from a mental health perspective as well as eating right and exercising.    Follow-up  Return in 3 months for follow-up.    Patient or patient representative verbalized consent for the use of Ambient Listening during the visit with  Angela Smith MD for chart documentation. 9/25/2024  14:39 EDT      FOLLOW UP  Return in about 3 months (around 12/25/2024).  Patient was given instructions and counseling regarding his condition or for health maintenance advice. Please see specific information pulled into the AVS if appropriate.     Angela Smith MD  09/25/24  14:40 EDT    CURRENT & DISCONTINUED MEDICATIONS  Current Outpatient Medications   Medication Instructions    amLODIPine (NORVASC) 5 mg, Oral, Daily    aspirin 81 mg, Oral, Daily    atorvastatin (LIPITOR) 80 mg, Oral, Daily    buPROPion XL (WELLBUTRIN XL) 300 mg, Oral, Daily    carvedilol (COREG) 6.25 mg, Oral, 2 Times Daily    cetirizine (ZYRTEC) 10 mg, Oral, Daily    clopidogrel (PLAVIX) 75 mg, Oral, Daily    desvenlafaxine (PRISTIQ) 50 mg, Oral, Daily    levothyroxine (SYNTHROID, LEVOTHROID) 75 mcg, Oral, Daily    losartan (COZAAR) 100 mg, Oral, Daily    nitroglycerin (NITROSTAT) 0.4 mg, Sublingual,  Every 5 Minutes PRN, Take no more than 3 doses in 15 minutes.    Wegovy 1 mg, Subcutaneous, Weekly       Medications Discontinued During This Encounter   Medication Reason    desvenlafaxine (PRISTIQ) 100 MG 24 hr tablet Reorder    Semaglutide-Weight Management (Wegovy) 0.5 MG/0.5ML solution auto-injector

## 2024-10-02 ENCOUNTER — PATIENT MESSAGE (OUTPATIENT)
Dept: INTERNAL MEDICINE | Facility: CLINIC | Age: 54
End: 2024-10-02
Payer: COMMERCIAL

## 2024-10-02 DIAGNOSIS — E29.1 HYPOGONADISM IN MALE: Primary | ICD-10-CM

## 2024-10-02 LAB
TESTOST FREE SERPL-MCNC: 2 PG/ML (ref 7.2–24)
TESTOST SERPL-MCNC: 236 NG/DL (ref 264–916)

## 2024-10-09 NOTE — TELEPHONE ENCOUNTER
From: Yousuf Em  To: Angela Smith  Sent: 10/2/2024 9:09 AM EDT  Subject: Testosterone    I would be willing to the idea of taking testosterone replacement therapy. Also, I was going over the test results. Should I be concerned about my kidney function?

## 2024-11-07 ENCOUNTER — OFFICE VISIT (OUTPATIENT)
Dept: ENDOCRINOLOGY | Age: 54
End: 2024-11-07
Payer: COMMERCIAL

## 2024-11-07 VITALS
SYSTOLIC BLOOD PRESSURE: 130 MMHG | HEART RATE: 75 BPM | TEMPERATURE: 98 F | DIASTOLIC BLOOD PRESSURE: 90 MMHG | WEIGHT: 257.2 LBS | OXYGEN SATURATION: 98 % | BODY MASS INDEX: 37.96 KG/M2

## 2024-11-07 DIAGNOSIS — R79.89 LOW TESTOSTERONE IN MALE: Primary | ICD-10-CM

## 2024-11-07 NOTE — PROGRESS NOTES
Chief Complaint/ reason for consult:      Hypogonadism      History of Present Illness    Referred for further management of hypogonadism.    Diagnosed with hypogonadism in 2015   On TRT , discontinued in 2019 after he had a heart attack     Noted to have low testosterone, checked  at 15: 24.     Component      Latest Ref Rng 9/25/2024   Testosterone, Total      264 - 916 ng/dL 236 (L)    Testosterone, Free      7.2 - 24.0 pg/mL 2.0 (L)       Complains of intermittent erectile dysfunction and fatigue  Denies headaches or any changes in his vision.  Denies breast enlargement or discharge  No history of blood clots  No personal or family history of prostate cancer  His mother has breast cancer  Has sleep apnea and using CPAP machine every night    Takes Wegovy for weight loss: Managed by PCP  Hypothyroidism: Managed by PCP      Component      Latest Ref Rng 9/25/2024   WBC      3.40 - 10.80 10*3/mm3 8.26    RBC      4.14 - 5.80 10*6/mm3 4.64    Hemoglobin      13.0 - 17.7 g/dL 14.5    Hematocrit      37.5 - 51.0 % 43.3    MCV      79.0 - 97.0 fL 93.3    MCH      26.6 - 33.0 pg 31.3    MCHC      31.5 - 35.7 g/dL 33.5    RDW      12.3 - 15.4 % 12.9    RDW-SD      37.0 - 54.0 fl 44.3    MPV      6.0 - 12.0 fL 9.6    Platelets      140 - 450 10*3/mm3 362    Neutrophil Rel %      42.7 - 76.0 % 61.6    Lymphocyte Rel %      19.6 - 45.3 % 27.6    Monocyte Rel %      5.0 - 12.0 % 7.4    Eosinophil Rel %      0.3 - 6.2 % 2.4    Basophil Rel %      0.0 - 1.5 % 0.6    Immature Granulocyte Rel %      0.0 - 0.5 % 0.4    Neutrophils Absolute      1.70 - 7.00 10*3/mm3 5.09    Lymphocytes Absolute      0.70 - 3.10 10*3/mm3 2.28    Monocytes Absolute      0.10 - 0.90 10*3/mm3 0.61    Eosinophils Absolute      0.00 - 0.40 10*3/mm3 0.20    Basophils Absolute      0.00 - 0.20 10*3/mm3 0.05    Immature Grans, Absolute      0.00 - 0.05 10*3/mm3 0.03    nRBC      0.0 - 0.2 /100 WBC 0.0    Testosterone, Total      264 - 916 ng/dL 236 (L)   "  Testosterone, Free      7.2 - 24.0 pg/mL 2.0 (L)    TSH Baseline      0.270 - 4.200 uIU/mL 1.720    PSA      0.000 - 4.000 ng/mL 0.931       Legend:  (L) Low    Objective   Vital Signs:  /90   Pulse 75   Temp 98 °F (36.7 °C) (Oral)   Wt 117 kg (257 lb 3.2 oz)   SpO2 98%   BMI 37.96 kg/m²   Estimated body mass index is 37.96 kg/m² as calculated from the following:    Height as of 9/25/24: 175.3 cm (69.02\").    Weight as of this encounter: 117 kg (257 lb 3.2 oz).                   Physical Exam  Constitutional:       Appearance: He is obese.   Cardiovascular:      Rate and Rhythm: Normal rate.   Pulmonary:      Effort: Pulmonary effort is normal.      Breath sounds: Normal breath sounds. No wheezing.   Abdominal:      Palpations: Abdomen is soft.      Tenderness: There is no abdominal tenderness.   Musculoskeletal:         General: No swelling.      Cervical back: Neck supple. No tenderness.      Comments: No gynecomastia.   Neurological:      Mental Status: He is alert and oriented to person, place, and time.   Psychiatric:         Mood and Affect: Mood normal.        Result Review :  The following data was reviewed by: Mahrokh Nokhbehzaeim, MD on 11/07/2024:  CMP          1/12/2024    04:51 1/13/2024    05:52 9/25/2024    15:24   CMP   Glucose 88  90  86    BUN 13  14  16    Creatinine 1.20  1.24  1.46    EGFR 72.3  69.5  56.8    Sodium 138  138  141    Potassium 3.7  4.1  3.8    Chloride 104  104  101    Calcium 8.7  9.2  10.1    Total Protein 6.8   7.7    Albumin 4.0   4.5    Globulin 2.8   3.2    Total Bilirubin 0.4   0.4    Alkaline Phosphatase 96   113    AST (SGOT) 17   20    ALT (SGPT) 26   21    Albumin/Globulin Ratio 1.4   1.4    BUN/Creatinine Ratio 10.8  11.3  11.0    Anion Gap 11.4  12.1  13.1      CMP          1/12/2024    04:51 1/13/2024    05:52 9/25/2024    15:24   CMP   Glucose 88  90  86    BUN 13  14  16    Creatinine 1.20  1.24  1.46    EGFR 72.3  69.5  56.8    Sodium 138  138  141  " "  Potassium 3.7  4.1  3.8    Chloride 104  104  101    Calcium 8.7  9.2  10.1    Total Protein 6.8   7.7    Albumin 4.0   4.5    Globulin 2.8   3.2    Total Bilirubin 0.4   0.4    Alkaline Phosphatase 96   113    AST (SGOT) 17   20    ALT (SGPT) 26   21    Albumin/Globulin Ratio 1.4   1.4    BUN/Creatinine Ratio 10.8  11.3  11.0    Anion Gap 11.4  12.1  13.1       CBC          1/12/2024    04:51 1/13/2024    05:52 9/25/2024    15:24   CBC   WBC 8.79  9.46  8.26    RBC 4.35  4.39  4.64    Hemoglobin 13.3  13.7  14.5    Hematocrit 38.7  39.1  43.3    MCV 89.0  89.1  93.3    MCH 30.6  31.2  31.3    MCHC 34.4  35.0  33.5    RDW 12.2  12.5  12.9    Platelets 332  321  362       TSH          12/27/2023    09:51 9/25/2024    15:24   TSH   TSH 2.820  1.720       No results found for: \"T3FREE\"   Testosterone, Total   Date Value Ref Range Status   09/25/2024 236 (L) 264 - 916 ng/dL Final     Comment:     Adult male reference interval is based on a population of  healthy nonobese males (BMI <30) between 19 and 39 years old.  Kimberley et.al. JCEM 2017,102;9985-2153. PMID: [id number].   02/08/2021 134 (L) 193 - 740 ng/dL Final     Testosterone, Free   Date Value Ref Range Status   09/25/2024 2.0 (L) 7.2 - 24.0 pg/mL Final                   Assessment and Plan   Diagnoses and all orders for this visit:    1. Low testosterone in male (Primary)  Assessment & Plan:  Will do the comprehensive workup to rule out hypogonadism.  If based on the results TRT is indicated needs a cardiac clearance.    Orders:  -     Prolactin  -     Testosterone  -     Estradiol  -     Sex Horm Binding Globulin; Future  -     Comprehensive Metabolic Panel; Future  -     FSH & LH             Follow Up   Return in about 6 months (around 5/7/2025).  Patient was given instructions and counseling regarding his condition or for health maintenance advice. Please see specific information pulled into the AVS if appropriate.       "

## 2024-11-07 NOTE — ASSESSMENT & PLAN NOTE
Will do the comprehensive workup to rule out hypogonadism.  If based on the results TRT is indicated needs a cardiac clearance.

## 2024-11-11 ENCOUNTER — LAB (OUTPATIENT)
Facility: HOSPITAL | Age: 54
End: 2024-11-11
Payer: COMMERCIAL

## 2024-11-11 DIAGNOSIS — R79.89 LOW TESTOSTERONE IN MALE: ICD-10-CM

## 2024-11-11 LAB
ALBUMIN SERPL-MCNC: 4.2 G/DL (ref 3.5–5.2)
ALBUMIN/GLOB SERPL: 1.2 G/DL
ALP SERPL-CCNC: 114 U/L (ref 39–117)
ALT SERPL W P-5'-P-CCNC: 29 U/L (ref 1–41)
ANION GAP SERPL CALCULATED.3IONS-SCNC: 10.3 MMOL/L (ref 5–15)
AST SERPL-CCNC: 15 U/L (ref 1–40)
BILIRUB SERPL-MCNC: 0.4 MG/DL (ref 0–1.2)
BUN SERPL-MCNC: 17 MG/DL (ref 6–20)
BUN/CREAT SERPL: 11.8 (ref 7–25)
CALCIUM SPEC-SCNC: 9.5 MG/DL (ref 8.6–10.5)
CHLORIDE SERPL-SCNC: 107 MMOL/L (ref 98–107)
CO2 SERPL-SCNC: 23.7 MMOL/L (ref 22–29)
CREAT SERPL-MCNC: 1.44 MG/DL (ref 0.76–1.27)
EGFRCR SERPLBLD CKD-EPI 2021: 57.7 ML/MIN/1.73
ESTRADIOL SERPL HS-MCNC: 31 PG/ML
FSH SERPL-ACNC: 8.69 MIU/ML
GLOBULIN UR ELPH-MCNC: 3.4 GM/DL
GLUCOSE SERPL-MCNC: 93 MG/DL (ref 65–99)
LH SERPL-ACNC: 5.28 MIU/ML
POTASSIUM SERPL-SCNC: 4.2 MMOL/L (ref 3.5–5.2)
PROLACTIN SERPL-MCNC: 7.96 NG/ML (ref 4.04–15.2)
PROT SERPL-MCNC: 7.6 G/DL (ref 6–8.5)
SODIUM SERPL-SCNC: 141 MMOL/L (ref 136–145)
TESTOST SERPL-MCNC: 158 NG/DL (ref 193–740)

## 2024-11-11 PROCEDURE — 83001 ASSAY OF GONADOTROPIN (FSH): CPT | Performed by: STUDENT IN AN ORGANIZED HEALTH CARE EDUCATION/TRAINING PROGRAM

## 2024-11-11 PROCEDURE — 84270 ASSAY OF SEX HORMONE GLOBUL: CPT

## 2024-11-11 PROCEDURE — 83002 ASSAY OF GONADOTROPIN (LH): CPT | Performed by: STUDENT IN AN ORGANIZED HEALTH CARE EDUCATION/TRAINING PROGRAM

## 2024-11-11 PROCEDURE — 80053 COMPREHEN METABOLIC PANEL: CPT

## 2024-11-11 PROCEDURE — 84403 ASSAY OF TOTAL TESTOSTERONE: CPT | Performed by: STUDENT IN AN ORGANIZED HEALTH CARE EDUCATION/TRAINING PROGRAM

## 2024-11-11 PROCEDURE — 82670 ASSAY OF TOTAL ESTRADIOL: CPT | Performed by: STUDENT IN AN ORGANIZED HEALTH CARE EDUCATION/TRAINING PROGRAM

## 2024-11-11 PROCEDURE — 36415 COLL VENOUS BLD VENIPUNCTURE: CPT

## 2024-11-11 PROCEDURE — 84146 ASSAY OF PROLACTIN: CPT | Performed by: STUDENT IN AN ORGANIZED HEALTH CARE EDUCATION/TRAINING PROGRAM

## 2024-11-12 DIAGNOSIS — R79.89 LOW TESTOSTERONE IN MALE: Primary | ICD-10-CM

## 2024-11-12 LAB — SHBG SERPL-SCNC: 27.2 NMOL/L (ref 19.3–76.4)

## 2024-11-18 RX ORDER — SEMAGLUTIDE 1 MG/.5ML
1 INJECTION, SOLUTION SUBCUTANEOUS WEEKLY
Qty: 2 ML | Refills: 1 | Status: SHIPPED | OUTPATIENT
Start: 2024-11-18

## 2024-11-25 ENCOUNTER — LAB (OUTPATIENT)
Facility: HOSPITAL | Age: 54
End: 2024-11-25
Payer: COMMERCIAL

## 2024-11-25 DIAGNOSIS — R79.89 LOW TESTOSTERONE IN MALE: ICD-10-CM

## 2024-11-25 LAB
ALBUMIN SERPL-MCNC: 3.7 G/DL (ref 3.5–5.2)
ALBUMIN/GLOB SERPL: 1.1 G/DL
ALP SERPL-CCNC: 106 U/L (ref 39–117)
ALT SERPL W P-5'-P-CCNC: 27 U/L (ref 1–41)
ANION GAP SERPL CALCULATED.3IONS-SCNC: 8.2 MMOL/L (ref 5–15)
AST SERPL-CCNC: 18 U/L (ref 1–40)
BILIRUB SERPL-MCNC: 0.3 MG/DL (ref 0–1.2)
BUN SERPL-MCNC: 18 MG/DL (ref 6–20)
BUN/CREAT SERPL: 11.8 (ref 7–25)
CALCIUM SPEC-SCNC: 9.6 MG/DL (ref 8.6–10.5)
CHLORIDE SERPL-SCNC: 103 MMOL/L (ref 98–107)
CO2 SERPL-SCNC: 26.8 MMOL/L (ref 22–29)
CREAT SERPL-MCNC: 1.52 MG/DL (ref 0.76–1.27)
EGFRCR SERPLBLD CKD-EPI 2021: 54.1 ML/MIN/1.73
GLOBULIN UR ELPH-MCNC: 3.4 GM/DL
GLUCOSE SERPL-MCNC: 100 MG/DL (ref 65–99)
POTASSIUM SERPL-SCNC: 4.1 MMOL/L (ref 3.5–5.2)
PROT SERPL-MCNC: 7.1 G/DL (ref 6–8.5)
SODIUM SERPL-SCNC: 138 MMOL/L (ref 136–145)
TESTOST SERPL-MCNC: 213 NG/DL (ref 193–740)

## 2024-11-25 PROCEDURE — 36415 COLL VENOUS BLD VENIPUNCTURE: CPT

## 2024-11-25 PROCEDURE — 84270 ASSAY OF SEX HORMONE GLOBUL: CPT

## 2024-11-25 PROCEDURE — 80053 COMPREHEN METABOLIC PANEL: CPT

## 2024-11-25 PROCEDURE — 84403 ASSAY OF TOTAL TESTOSTERONE: CPT

## 2024-11-26 ENCOUNTER — PATIENT MESSAGE (OUTPATIENT)
Dept: INTERNAL MEDICINE | Facility: CLINIC | Age: 54
End: 2024-11-26
Payer: COMMERCIAL

## 2024-11-26 LAB — SHBG SERPL-SCNC: 28.8 NMOL/L (ref 19.3–76.4)

## 2024-11-27 ENCOUNTER — TELEPHONE (OUTPATIENT)
Dept: ENDOCRINOLOGY | Age: 54
End: 2024-11-27
Payer: COMMERCIAL

## 2024-11-27 DIAGNOSIS — R79.89 LOW TESTOSTERONE IN MALE: Primary | ICD-10-CM

## 2024-11-27 NOTE — TELEPHONE ENCOUNTER
Called and discussed the results  Total testosterone 213  Calculated free testosterone 4.74 within the normal range  Takes Wegovy  Given history of MI while on TRT recommended to hold off on TRT for now as weight loss might improve the testosterone level  Repeat fasting testosterone in 3 months   agreed and expressed understanding of the information provided

## 2024-11-29 RX ORDER — SEMAGLUTIDE 1.7 MG/.75ML
1.7 INJECTION, SOLUTION SUBCUTANEOUS WEEKLY
Qty: 6 ML | Refills: 0 | Status: SHIPPED | OUTPATIENT
Start: 2024-11-29

## 2025-01-10 ENCOUNTER — OFFICE VISIT (OUTPATIENT)
Dept: INTERNAL MEDICINE | Facility: CLINIC | Age: 55
End: 2025-01-10
Payer: COMMERCIAL

## 2025-01-10 VITALS
TEMPERATURE: 97.9 F | BODY MASS INDEX: 39.16 KG/M2 | WEIGHT: 264.4 LBS | OXYGEN SATURATION: 95 % | HEIGHT: 69 IN | SYSTOLIC BLOOD PRESSURE: 124 MMHG | DIASTOLIC BLOOD PRESSURE: 66 MMHG | HEART RATE: 71 BPM

## 2025-01-10 DIAGNOSIS — I10 PRIMARY HYPERTENSION: ICD-10-CM

## 2025-01-10 DIAGNOSIS — R41.3 MEMORY LOSS: ICD-10-CM

## 2025-01-10 DIAGNOSIS — E78.2 MIXED HYPERLIPIDEMIA: ICD-10-CM

## 2025-01-10 DIAGNOSIS — R79.89 LOW TESTOSTERONE IN MALE: ICD-10-CM

## 2025-01-10 DIAGNOSIS — R73.01 IMPAIRED FASTING GLUCOSE: ICD-10-CM

## 2025-01-10 DIAGNOSIS — F33.42 RECURRENT MAJOR DEPRESSIVE DISORDER, IN FULL REMISSION: ICD-10-CM

## 2025-01-10 DIAGNOSIS — L21.9 SEBORRHEIC DERMATITIS: Primary | ICD-10-CM

## 2025-01-10 DIAGNOSIS — E06.3 HYPOTHYROIDISM DUE TO HASHIMOTO'S THYROIDITIS: ICD-10-CM

## 2025-01-10 LAB
ALBUMIN SERPL-MCNC: 4.5 G/DL (ref 3.5–5.2)
ALBUMIN/GLOB SERPL: 1.3 G/DL
ALP SERPL-CCNC: 117 U/L (ref 39–117)
ALT SERPL W P-5'-P-CCNC: 28 U/L (ref 1–41)
ANION GAP SERPL CALCULATED.3IONS-SCNC: 9 MMOL/L (ref 5–15)
AST SERPL-CCNC: 22 U/L (ref 1–40)
BASOPHILS # BLD AUTO: 0.06 10*3/MM3 (ref 0–0.2)
BASOPHILS NFR BLD AUTO: 0.8 % (ref 0–1.5)
BILIRUB SERPL-MCNC: 0.6 MG/DL (ref 0–1.2)
BUN SERPL-MCNC: 14 MG/DL (ref 6–20)
BUN/CREAT SERPL: 10.5 (ref 7–25)
CALCIUM SPEC-SCNC: 9.7 MG/DL (ref 8.6–10.5)
CHLORIDE SERPL-SCNC: 101 MMOL/L (ref 98–107)
CHOLEST SERPL-MCNC: 122 MG/DL (ref 0–200)
CO2 SERPL-SCNC: 26 MMOL/L (ref 22–29)
CREAT SERPL-MCNC: 1.33 MG/DL (ref 0.76–1.27)
DEPRECATED RDW RBC AUTO: 43.4 FL (ref 37–54)
EGFRCR SERPLBLD CKD-EPI 2021: 63.5 ML/MIN/1.73
EOSINOPHIL # BLD AUTO: 0.26 10*3/MM3 (ref 0–0.4)
EOSINOPHIL NFR BLD AUTO: 3.4 % (ref 0.3–6.2)
ERYTHROCYTE [DISTWIDTH] IN BLOOD BY AUTOMATED COUNT: 12.9 % (ref 12.3–15.4)
GLOBULIN UR ELPH-MCNC: 3.5 GM/DL
GLUCOSE SERPL-MCNC: 74 MG/DL (ref 65–99)
HBA1C MFR BLD: 5.3 % (ref 4.8–5.6)
HCT VFR BLD AUTO: 42.3 % (ref 37.5–51)
HDLC SERPL-MCNC: 33 MG/DL (ref 40–60)
HGB BLD-MCNC: 14.7 G/DL (ref 13–17.7)
IMM GRANULOCYTES # BLD AUTO: 0.02 10*3/MM3 (ref 0–0.05)
IMM GRANULOCYTES NFR BLD AUTO: 0.3 % (ref 0–0.5)
LDLC SERPL CALC-MCNC: 67 MG/DL (ref 0–100)
LDLC/HDLC SERPL: 1.95 {RATIO}
LYMPHOCYTES # BLD AUTO: 2.44 10*3/MM3 (ref 0.7–3.1)
LYMPHOCYTES NFR BLD AUTO: 31.5 % (ref 19.6–45.3)
MCH RBC QN AUTO: 31.7 PG (ref 26.6–33)
MCHC RBC AUTO-ENTMCNC: 34.8 G/DL (ref 31.5–35.7)
MCV RBC AUTO: 91.4 FL (ref 79–97)
MONOCYTES # BLD AUTO: 0.63 10*3/MM3 (ref 0.1–0.9)
MONOCYTES NFR BLD AUTO: 8.1 % (ref 5–12)
NEUTROPHILS NFR BLD AUTO: 4.33 10*3/MM3 (ref 1.7–7)
NEUTROPHILS NFR BLD AUTO: 55.9 % (ref 42.7–76)
NRBC BLD AUTO-RTO: 0 /100 WBC (ref 0–0.2)
PLATELET # BLD AUTO: 357 10*3/MM3 (ref 140–450)
PMV BLD AUTO: 9.3 FL (ref 6–12)
POTASSIUM SERPL-SCNC: 4.2 MMOL/L (ref 3.5–5.2)
PROT SERPL-MCNC: 8 G/DL (ref 6–8.5)
RBC # BLD AUTO: 4.63 10*6/MM3 (ref 4.14–5.8)
SODIUM SERPL-SCNC: 136 MMOL/L (ref 136–145)
TRIGL SERPL-MCNC: 124 MG/DL (ref 0–150)
TSH SERPL DL<=0.05 MIU/L-ACNC: 3.29 UIU/ML (ref 0.27–4.2)
VLDLC SERPL-MCNC: 22 MG/DL (ref 5–40)
WBC NRBC COR # BLD AUTO: 7.74 10*3/MM3 (ref 3.4–10.8)

## 2025-01-10 PROCEDURE — 99214 OFFICE O/P EST MOD 30 MIN: CPT | Performed by: INTERNAL MEDICINE

## 2025-01-10 PROCEDURE — 80053 COMPREHEN METABOLIC PANEL: CPT | Performed by: INTERNAL MEDICINE

## 2025-01-10 PROCEDURE — 83036 HEMOGLOBIN GLYCOSYLATED A1C: CPT | Performed by: INTERNAL MEDICINE

## 2025-01-10 PROCEDURE — 84443 ASSAY THYROID STIM HORMONE: CPT | Performed by: INTERNAL MEDICINE

## 2025-01-10 PROCEDURE — 80061 LIPID PANEL: CPT | Performed by: INTERNAL MEDICINE

## 2025-01-10 PROCEDURE — 85025 COMPLETE CBC W/AUTO DIFF WBC: CPT | Performed by: INTERNAL MEDICINE

## 2025-01-10 PROCEDURE — 36415 COLL VENOUS BLD VENIPUNCTURE: CPT | Performed by: INTERNAL MEDICINE

## 2025-01-10 RX ORDER — SEMAGLUTIDE 2.4 MG/.75ML
2.4 INJECTION, SOLUTION SUBCUTANEOUS WEEKLY
Qty: 6 ML | Refills: 1 | Status: SHIPPED | OUTPATIENT
Start: 2025-01-10

## 2025-01-10 RX ORDER — KETOCONAZOLE 20 MG/G
1 CREAM TOPICAL DAILY
Qty: 60 G | Refills: 1 | Status: SHIPPED | OUTPATIENT
Start: 2025-01-10

## 2025-01-10 NOTE — ASSESSMENT & PLAN NOTE
Orders:    CBC & Differential    Comprehensive Metabolic Panel    Hemoglobin A1c    Lipid Panel    TSH

## 2025-01-10 NOTE — PROGRESS NOTES
"Chief Complaint  Follow-up (Sore sensation on the scalp, had for over a year )      Subjective      History of Present Illness  The patient presents for evaluation of a sore spot on his head, prediabetes, memory issues, stress and anxiety, testosterone management, and blood pressure management.    He reports a persistent, tender area on the top of his head for several years, with significant dry skin around the head and nose. He recalls scratching his cheek, resulting in pain for 4-5 days.     He has reduced processed food intake and is on Wegovy 1.7 mg for weight management, with no gastrointestinal symptoms.     He continues to struggle with memory issues and has an upcoming neurologist appointment on the 14th.     Reports improved stress levels. [facility], initially reduced Pristiq and Wellbutrin dosages, but resumed full Wellbutrin 300 mg due to racing thoughts and sleep disturbances. Concerned about potential kidney function impairment from medications.    Endocrinologist found testosterone levels in the low normal range; follow-up test in 6 months. Appointment with Dr. Cordreo in March 2024.    Home blood pressure readings are higher than [facility] readings, averaging 138/93. Experiences dizziness upon standing, possibly due to medication, and occasional leg swelling, possibly due to sock elasticity.    FAMILY HISTORY  - Mother has a history of dementia             Objective   Vital Signs:   Vitals:    01/10/25 1040   BP: 124/66   Pulse: 71   Temp: 97.9 °F (36.6 °C)   SpO2: 95%   Weight: 120 kg (264 lb 6.4 oz)   Height: 175.3 cm (69.02\")     Body mass index is 39.03 kg/m².    Wt Readings from Last 3 Encounters:   01/10/25 120 kg (264 lb 6.4 oz)   11/07/24 117 kg (257 lb 3.2 oz)   09/25/24 116 kg (256 lb 9.6 oz)     BP Readings from Last 3 Encounters:   01/10/25 124/66   11/07/24 130/90   09/25/24 132/84       Health Maintenance   Topic Date Due    COVID-19 Vaccine (3 - 2024-25 season) 09/01/2024 "    ANNUAL PHYSICAL  09/25/2025    LIPID PANEL  01/10/2026    BMI FOLLOWUP  01/10/2026    TDAP/TD VACCINES (2 - Td or Tdap) 08/03/2030    COLORECTAL CANCER SCREENING  08/10/2031    HEPATITIS C SCREENING  Completed    INFLUENZA VACCINE  Completed    ZOSTER VACCINE  Completed    Pneumococcal Vaccine 0-64  Aged Out       Physical Exam  Vitals reviewed.   Constitutional:       Appearance: Normal appearance. He is well-developed. He is obese.   HENT:      Head: Normocephalic and atraumatic.      Right Ear: External ear normal.      Left Ear: External ear normal.   Eyes:      Conjunctiva/sclera: Conjunctivae normal.      Pupils: Pupils are equal, round, and reactive to light.   Cardiovascular:      Rate and Rhythm: Normal rate and regular rhythm.      Heart sounds: No murmur heard.     No friction rub. No gallop.   Pulmonary:      Effort: Pulmonary effort is normal.      Breath sounds: Normal breath sounds. No wheezing or rhonchi.   Skin:     General: Skin is warm and dry.   Neurological:      Mental Status: He is alert and oriented to person, place, and time.   Psychiatric:         Mood and Affect: Affect normal.         Behavior: Behavior normal.         Thought Content: Thought content normal.        Physical Exam        Result Review :  The following data was reviewed by: Angela Smith MD on 01/10/2025:         Results  Reviewed prior labs            Procedures            Assessment & Plan  Seborrheic dermatitis         Hypothyroidism due to Hashimoto's thyroiditis    Orders:    CBC & Differential    Comprehensive Metabolic Panel    Hemoglobin A1c    Lipid Panel    TSH    Primary hypertension      Orders:    CBC & Differential    Comprehensive Metabolic Panel    Hemoglobin A1c    Lipid Panel    TSH    Mixed hyperlipidemia       Orders:    CBC & Differential    Comprehensive Metabolic Panel    Hemoglobin A1c    Lipid Panel    TSH    Recurrent major depressive disorder, in full remission           Memory loss          Low testosterone in male         Impaired fasting glucose    Orders:    Hemoglobin A1c         Assessment & Plan  1. Seborrheic Dermatitis  - Likely exacerbated by fungal infection or prediabetes  - Prescribe topical cream for flakiness  - Consider dermatologist referral for biopsy if symptoms persist    2. Prediabetes  - Impaired fasting glucose 6.3 and A1c 6.2 two years ago  - Continue healthy lifestyle and avoid sugar  - Prescribe Wegovy 2.4 mg for 3 months  - Monitor for side effects  - Conduct non-fasting blood work today    3. Memory Issues  - Elevated risk for memory deterioration  - Maintain healthy diet, ensure adequate oxygenation, and exercise regularly  - Explore [employer] website for weight training  - Discuss dementia research with provider    4. Stress and Anxiety  - Kidney function likely influenced by blood pressure, blood sugar, and cardiac health  - Resume Pristiq if not managing well on reduced dose  - If tolerating lower dose, continue  - If struggling with racing thoughts, consider increasing dose    5. Testosterone Management  - Consult endo regarding testosterone therapy    6. Blood Pressure Management  - Lightheadedness and dizziness suggest low blood pressure, possibly due to Coreg  - Monitor blood pressure at home  - If high, increase amlodipine dosage  - If increased dizziness or lightheadedness, revert to original dosage and consult Dr. Cordero    Patient or patient representative verbalized consent for the use of Ambient Listening during the visit with  Angela Smith MD for chart documentation. 1/26/2025  11:18 EST      FOLLOW UP  No follow-ups on file.  Patient was given instructions and counseling regarding his condition or for health maintenance advice. Please see specific information pulled into the AVS if appropriate.     Angela Smith MD  01/26/25  14:04 EST    CURRENT & DISCONTINUED MEDICATIONS  Current Outpatient Medications   Medication Instructions     amLODIPine (NORVASC) 5 mg, Oral, Daily    aspirin 81 mg, Daily    atorvastatin (LIPITOR) 80 mg, Oral, Daily    buPROPion XL (WELLBUTRIN XL) 300 mg, Daily    carvedilol (COREG) 6.25 mg, Oral, 2 Times Daily    cetirizine (ZYRTEC) 10 mg, Daily    clopidogrel (PLAVIX) 75 mg, Daily    desvenlafaxine (PRISTIQ) 50 mg, Oral, Daily    ketoconazole (NIZORAL) 2 % cream 1 Application, Topical, Daily    levothyroxine (SYNTHROID, LEVOTHROID) 75 mcg, Oral, Daily    losartan (COZAAR) 100 mg, Oral, Daily    nitroglycerin (NITROSTAT) 0.4 mg, Sublingual, Every 5 Minutes PRN, Take no more than 3 doses in 15 minutes.    Wegovy 2.4 mg, Subcutaneous, Weekly       Medications Discontinued During This Encounter   Medication Reason    Semaglutide-Weight Management (Wegovy) 1.7 MG/0.75ML solution auto-injector

## 2025-02-28 ENCOUNTER — PREP FOR SURGERY (OUTPATIENT)
Dept: OTHER | Facility: HOSPITAL | Age: 55
End: 2025-02-28
Payer: COMMERCIAL

## 2025-02-28 ENCOUNTER — OFFICE VISIT (OUTPATIENT)
Dept: CARDIOLOGY | Facility: CLINIC | Age: 55
End: 2025-02-28
Payer: COMMERCIAL

## 2025-02-28 VITALS
WEIGHT: 254 LBS | SYSTOLIC BLOOD PRESSURE: 135 MMHG | BODY MASS INDEX: 37.62 KG/M2 | HEART RATE: 77 BPM | HEIGHT: 69 IN | DIASTOLIC BLOOD PRESSURE: 94 MMHG

## 2025-02-28 DIAGNOSIS — I10 ESSENTIAL HYPERTENSION: Primary | ICD-10-CM

## 2025-02-28 DIAGNOSIS — I20.0 ACCELERATING ANGINA: ICD-10-CM

## 2025-02-28 DIAGNOSIS — I25.118 CORONARY ARTERY DISEASE INVOLVING NATIVE CORONARY ARTERY OF NATIVE HEART WITH OTHER FORM OF ANGINA PECTORIS: ICD-10-CM

## 2025-02-28 DIAGNOSIS — I20.0 ACCELERATING ANGINA: Primary | ICD-10-CM

## 2025-02-28 DIAGNOSIS — E78.5 HYPERLIPIDEMIA LDL GOAL <70: ICD-10-CM

## 2025-02-28 PROBLEM — I25.10 CORONARY ARTERY DISEASE: Status: ACTIVE | Noted: 2025-02-28

## 2025-02-28 RX ORDER — SODIUM CHLORIDE 0.9 % (FLUSH) 0.9 %
10 SYRINGE (ML) INJECTION EVERY 12 HOURS SCHEDULED
OUTPATIENT
Start: 2025-02-28

## 2025-02-28 RX ORDER — SODIUM CHLORIDE 0.9 % (FLUSH) 0.9 %
10 SYRINGE (ML) INJECTION AS NEEDED
OUTPATIENT
Start: 2025-02-28

## 2025-02-28 RX ORDER — SODIUM CHLORIDE 9 MG/ML
40 INJECTION, SOLUTION INTRAVENOUS AS NEEDED
OUTPATIENT
Start: 2025-02-28

## 2025-02-28 NOTE — PROGRESS NOTES
Chief Complaint  6 month follow up , Coronary Artery Disease, and Presence of stent in LAD coronary artery    Subjective            Yousuf Em presents to [facility] CARDIOLOGY      Yousuf is here for follow-up evaluation management of coronary artery disease with previous circumflex PCI 2022, IFR guided LAD PCI January 2024, hypertension, mixed hyperlipidemia.  Recently he had an episode where he woke up from sleep with tightness in his chest, jaw pain teeth discomfort lasting about 10 minutes and he took nitroglycerin.  This is very similar to his anginal discomfort previously.  He had 1 other episode noted also in December.  He has been compliant with his medical therapy.  [facility].    PMH  Past Medical History:   Diagnosis Date    ADHD (attention deficit hyperactivity disorder) 2008    Allergic 2000    AR (allergic rhinitis)     Arthritis     Colitis     Colon polyp 2005    Coronary artery disease     Depression     ETD (eustachian tube dysfunction)     Forgetfulness     Hearing loss     High cholesterol     Hyperlipemia     Hypertension     Hypothyroidism 2005    Inflammatory bowel disease 2005    Labral tear of long head of biceps tendon, initial encounter 09/30/2015    Labral tear of shoulder, left, sequela 09/25/2015    Obesity 2010    Right lateral epicondylitis 10/21/2016    Seasonal allergies     Testosterone deficiency 2010    Thyroid disorder     Vitamin D deficiency 2024         SURGICALHX  Past Surgical History:   Procedure Laterality Date    CARDIAC CATHETERIZATION N/A 5/5/2022    Procedure: Left Heart Cath;  Surgeon: Samy Kaur MD;  Location: [facility] INVASIVE LOCATION;  Service: Cardiovascular;  Laterality: N/A;    CARDIAC CATHETERIZATION N/A 5/5/2022    Procedure: Percutaneous Coronary Intervention;  Surgeon: Samy Kaur MD;  Location: [facility] CATH INVASIVE LOCATION;  Service:  Cardiovascular;  Laterality: N/A;    CARDIAC CATHETERIZATION N/A 1/12/2024    Procedure: Left Heart Cath;  Surgeon: Nader Huntley MD;  Location: [facility] CATH INVASIVE LOCATION;  Service: Cardiology;  Laterality: N/A;    CHOLECYSTECTOMY  2000    COLONOSCOPY      EAR TUBES      GALLBLADDER SURGERY      OTHER SURGICAL HISTORY      JOINT SURGERY        SOC  Social History     Socioeconomic History    Marital status: Single   Tobacco Use    Smoking status: Never     Passive exposure: Past (childhood)    Smokeless tobacco: Never   Vaping Use    Vaping status: Never Used   Substance and Sexual Activity    Alcohol use: Never    Drug use: Never    Sexual activity: Not Currently     Partners: Female     Birth control/protection: None, Birth control pill         FAMHX  Family History   Problem Relation Age of Onset    Arthritis Mother     Cancer Mother     Heart disease Mother     Diabetes Mother     Hypertension Mother     Kidney disease Mother         Stage3    Heart disease Father     Hypertension Father     Heart disease Sister     Stroke Maternal Grandmother           ALLERGY  No Known Allergies     MEDSCURRENT    Current Outpatient Medications:     amLODIPine (NORVASC) 5 MG tablet, Take 1 tablet by mouth Daily., Disp: 90 tablet, Rfl: 1    aspirin 81 MG chewable tablet, Chew 1 tablet Daily., Disp: , Rfl:     atorvastatin (LIPITOR) 80 MG tablet, Take 1 tablet by mouth Daily., Disp: 90 tablet, Rfl: 3    buPROPion XL (Wellbutrin XL) 300 MG 24 hr tablet, Take 1 tablet by mouth Daily., Disp: , Rfl:     carvedilol (COREG) 6.25 MG tablet, Take 1 tablet by mouth 2 (Two) Times a Day., Disp: 180 tablet, Rfl: 3    cetirizine (zyrTEC) 10 MG tablet, Take 1 tablet by mouth Daily., Disp: , Rfl:     clopidogrel (PLAVIX) 75 MG tablet, Take 1 tablet by mouth Daily., Disp: , Rfl:     desvenlafaxine (PRISTIQ) 50 MG 24 hr tablet, Take 1 tablet by mouth Daily., Disp: 90 tablet, Rfl: 1    ketoconazole (NIZORAL) 2 % cream, Apply 1  "Application topically to the appropriate area as directed Daily., Disp: 60 g, Rfl: 1    levothyroxine (SYNTHROID, LEVOTHROID) 75 MCG tablet, Take 1 tablet by mouth once daily for 90 days, Disp: 90 tablet, Rfl: 0    losartan (COZAAR) 100 MG tablet, Take 1 tablet by mouth once daily, Disp: 90 tablet, Rfl: 0    nitroglycerin (NITROSTAT) 0.4 MG SL tablet, Place 1 tablet under the tongue Every 5 (Five) Minutes As Needed for Chest Pain (Do not take more than 3 at one time. Go to ER or call 911 if chest pain persists). Take no more than 3 doses in 15 minutes., Disp: 30 tablet, Rfl: 1    Semaglutide-Weight Management (Wegovy) 2.4 MG/0.75ML solution auto-injector, Inject 2.4 mg under the skin into the appropriate area as directed 1 (One) Time Per Week., Disp: 6 mL, Rfl: 1      Review of Systems   Cardiovascular:  Positive for chest pain. Negative for dyspnea on exertion.        Jaw discomfort, chest tightness        Objective     /94   Pulse 77   Ht 175.3 cm (69.02\")   Wt 115 kg (254 lb)   BMI 37.49 kg/m²       General Appearance:   well developed  well nourished  HENT:   oropharynx moist  lips not cyanotic  Neck:  thyroid not enlarged  supple  Respiratory:  no respiratory distress  normal breath sounds  no rales  Cardiovascular:  no jugular venous distention  regular rhythm  apical impulse normal  S1 normal, S2 normal  no S3, no S4   no murmur  no rub, no thrill  carotid pulses normal; no bruit  pedal pulses normal  lower extremity edema: none    Musculoskeletal:  no clubbing of fingers.   normocephalic, head atraumatic  Skin:   warm, dry  Psychiatric:  judgement and insight appropriate  normal mood and affect      Result Review :     The following data was reviewed by: Yovani Cordero MD on 01/17/2024:    CMP          11/11/2024    09:37 11/25/2024    07:06 1/10/2025    11:36   CMP   Glucose 93  100  74    BUN 17  18  14    Creatinine 1.44  1.52  1.33    EGFR 57.7  54.1  63.5    Sodium 141  138  136  "   Potassium 4.2  4.1  4.2    Chloride 107  103  101    Calcium 9.5  9.6  9.7    Total Protein 7.6  7.1  8.0    Albumin 4.2  3.7  4.5    Globulin 3.4  3.4  3.5    Total Bilirubin 0.4  0.3  0.6    Alkaline Phosphatase 114  106  117    AST (SGOT) 15  18  22    ALT (SGPT) 29  27  28    Albumin/Globulin Ratio 1.2  1.1  1.3    BUN/Creatinine Ratio 11.8  11.8  10.5    Anion Gap 10.3  8.2  9.0      CBC          9/25/2024    15:24 1/10/2025    11:36   CBC   WBC 8.26  7.74    RBC 4.64  4.63    Hemoglobin 14.5  14.7    Hematocrit 43.3  42.3    MCV 93.3  91.4    MCH 31.3  31.7    MCHC 33.5  34.8    RDW 12.9  12.9    Platelets 362  357      Lipid Panel          9/25/2024    15:24 1/10/2025    11:36   Lipid Panel   Total Cholesterol 115  122    Triglycerides 197  124    HDL Cholesterol 31  33    VLDL Cholesterol 32  22    LDL Cholesterol  52  67    LDL/HDL Ratio 1.44  1.95      TSH          9/25/2024    15:24 1/10/2025    11:36   TSH   TSH 1.720  3.290        Data reviewed : Primary care records reviewed      Procedures           Assessment and Plan        ASSESSMENT:  Encounter Diagnoses   Name Primary?    Essential hypertension Yes    Hyperlipidemia LDL goal <70     Accelerating angina     Coronary artery disease involving native coronary artery of native heart with other form of angina pectoris          PLAN:    1.  Coronary artery disease, previous OM1 and most recent LAD PCI January 2024.  The patient has had a couple of episodes recently that are very similar to her previous anginal discomfort.  [facility].  I recommend repeating coronary angiography at this point to evaluate for obstructive disease.  He is on both calcium channel blockade and beta-blocker.  He has previously failed long-acting nitro due to intolerable headaches.  Risk benefits and alternatives were discussed with the patient.  He is agreeable to proceed.  2.  Essential hypertension-mildly elevated  today.  Recommend weight loss, continue current medical therapy and home BP monitoring  3.  Mixed hyperlipidemia, controlled, continue statin therapy    Follow-up will be determined after cardiac catheterization      Patient was given instructions and counseling regarding his condition or for health maintenance advice. Please see specific information pulled into the AVS if appropriate.             RISA Cordero MD  2/28/2025    11:51 EST

## 2025-02-28 NOTE — H&P (VIEW-ONLY)
Chief Complaint  6 month follow up , Coronary Artery Disease, and Presence of stent in LAD coronary artery    Subjective            Yousuf Em presents to [facility] CARDIOLOGY      Yousuf is here for follow-up evaluation management of coronary artery disease with previous circumflex PCI 2022, IFR guided LAD PCI January 2024, hypertension, mixed hyperlipidemia.  Recently he had an episode where he woke up from sleep with tightness in his chest, jaw pain teeth discomfort lasting about 10 minutes and he took nitroglycerin.  This is very similar to his anginal discomfort previously.  He had 1 other episode noted also in December.  He has been compliant with his medical therapy.  [facility] that [facility]ocal [facility].    PMH  Past Medical History:   Diagnosis Date    ADHD (attention deficit hyperactivity disorder) 2008    Allergic 2000    AR (allergic rhinitis)     Arthritis     Colitis     Colon polyp 2005    Coronary artery disease     Depression     ETD (eustachian tube dysfunction)     Forgetfulness     Hearing loss     High cholesterol     Hyperlipemia     Hypertension     Hypothyroidism 2005    Inflammatory bowel disease 2005    Labral tear of long head of biceps tendon, initial encounter 09/30/2015    Labral tear of shoulder, left, sequela 09/25/2015    Obesity 2010    Right lateral epicondylitis 10/21/2016    Seasonal allergies     Testosterone deficiency 2010    Thyroid disorder     Vitamin D deficiency 2024         SURGICALHX  Past Surgical History:   Procedure Laterality Date    CARDIAC CATHETERIZATION N/A 5/5/2022    Procedure: Left Heart Cath;  Surgeon: Samy Kaur MD;  Location: [facility] INVASIVE LOCATION;  Service: Cardiovascular;  Laterality: N/A;    CARDIAC CATHETERIZATION N/A 5/5/2022    Procedure: Percutaneous Coronary Intervention;  Surgeon: Samy Kaur MD;  [facility]: [facility] CATH INVASIVE LOCATION;  Service:  Cardiovascular;  Laterality: N/A;    CARDIAC CATHETERIZATION N/A 1/12/2024    Procedure: Left Heart Cath;  Surgeon: Nader Huntley MD;  Location: [facility] CATH INVASIVE LOCATION;  Service: Cardiology;  Laterality: N/A;    CHOLECYSTECTOMY  2000    COLONOSCOPY      EAR TUBES      GALLBLADDER SURGERY      OTHER SURGICAL HISTORY      JOINT SURGERY        SOC  Social History     Socioeconomic History    Marital status: Single   Tobacco Use    Smoking status: Never     Passive exposure: Past (childhood)    Smokeless tobacco: Never   Vaping Use    Vaping status: Never Used   Substance and Sexual Activity    Alcohol use: Never    Drug use: Never    Sexual activity: Not Currently     Partners: Female     Birth control/protection: None, Birth control pill         FAMHX  Family History   Problem Relation Age of Onset    Arthritis Mother     Cancer Mother     Heart disease Mother     Diabetes Mother     Hypertension Mother     Kidney disease Mother         Stage3    Heart disease Father     Hypertension Father     Heart disease Sister     Stroke Maternal Grandmother           ALLERGY  No Known Allergies     MEDSCURRENT    Current Outpatient Medications:     amLODIPine (NORVASC) 5 MG tablet, Take 1 tablet by mouth Daily., Disp: 90 tablet, Rfl: 1    aspirin 81 MG chewable tablet, Chew 1 tablet Daily., Disp: , Rfl:     atorvastatin (LIPITOR) 80 MG tablet, Take 1 tablet by mouth Daily., Disp: 90 tablet, Rfl: 3    buPROPion XL (Wellbutrin XL) 300 MG 24 hr tablet, Take 1 tablet by mouth Daily., Disp: , Rfl:     carvedilol (COREG) 6.25 MG tablet, Take 1 tablet by mouth 2 (Two) Times a Day., Disp: 180 tablet, Rfl: 3    cetirizine (zyrTEC) 10 MG tablet, Take 1 tablet by mouth Daily., Disp: , Rfl:     clopidogrel (PLAVIX) 75 MG tablet, Take 1 tablet by mouth Daily., Disp: , Rfl:     desvenlafaxine (PRISTIQ) 50 MG 24 hr tablet, Take 1 tablet by mouth Daily., Disp: 90 tablet, Rfl: 1    ketoconazole (NIZORAL) 2 % cream, Apply 1  "Application topically to the appropriate area as directed Daily., Disp: 60 g, Rfl: 1    levothyroxine (SYNTHROID, LEVOTHROID) 75 MCG tablet, Take 1 tablet by mouth once daily for 90 days, Disp: 90 tablet, Rfl: 0    losartan (COZAAR) 100 MG tablet, Take 1 tablet by mouth once daily, Disp: 90 tablet, Rfl: 0    nitroglycerin (NITROSTAT) 0.4 MG SL tablet, Place 1 tablet under the tongue Every 5 (Five) Minutes As Needed for Chest Pain (Do not take more than 3 at one time. Go to ER or call 911 if chest pain persists). Take no more than 3 doses in 15 minutes., Disp: 30 tablet, Rfl: 1    Semaglutide-Weight Management (Wegovy) 2.4 MG/0.75ML solution auto-injector, Inject 2.4 mg under the skin into the appropriate area as directed 1 (One) Time Per Week., Disp: 6 mL, Rfl: 1      Review of Systems   Cardiovascular:  Positive for chest pain. Negative for dyspnea on exertion.        Jaw discomfort, chest tightness        Objective     /94   Pulse 77   Ht 175.3 cm (69.02\")   Wt 115 kg (254 lb)   BMI 37.49 kg/m²       General Appearance:   well developed  well nourished  HENT:   oropharynx moist  lips not cyanotic  Neck:  thyroid not enlarged  supple  Respiratory:  no respiratory distress  normal breath sounds  no rales  Cardiovascular:  no jugular venous distention  regular rhythm  apical impulse normal  S1 normal, S2 normal  no S3, no S4   no murmur  no rub, no thrill  carotid pulses normal; no bruit  pedal pulses normal  lower extremity edema: none    Musculoskeletal:  no clubbing of fingers.   normocephalic, head atraumatic  Skin:   warm, dry  Psychiatric:  judgement and insight appropriate  normal mood and affect      Result Review :     The following data was reviewed by: Yovani Cordero MD on 01/17/2024:    CMP          11/11/2024    09:37 11/25/2024    07:06 1/10/2025    11:36   CMP   Glucose 93  100  74    BUN 17  18  14    Creatinine 1.44  1.52  1.33    EGFR 57.7  54.1  63.5    Sodium 141  138  136  "   Potassium 4.2  4.1  4.2    Chloride 107  103  101    Calcium 9.5  9.6  9.7    Total Protein 7.6  7.1  8.0    Albumin 4.2  3.7  4.5    Globulin 3.4  3.4  3.5    Total Bilirubin 0.4  0.3  0.6    Alkaline Phosphatase 114  106  117    AST (SGOT) 15  18  22    ALT (SGPT) 29  27  28    Albumin/Globulin Ratio 1.2  1.1  1.3    BUN/Creatinine Ratio 11.8  11.8  10.5    Anion Gap 10.3  8.2  9.0      CBC          9/25/2024    15:24 1/10/2025    11:36   CBC   WBC 8.26  7.74    RBC 4.64  4.63    Hemoglobin 14.5  14.7    Hematocrit 43.3  42.3    MCV 93.3  91.4    MCH 31.3  31.7    MCHC 33.5  34.8    RDW 12.9  12.9    Platelets 362  357      Lipid Panel          9/25/2024    15:24 1/10/2025    11:36   Lipid Panel   Total Cholesterol 115  122    Triglycerides 197  124    HDL Cholesterol 31  33    VLDL Cholesterol 32  22    LDL Cholesterol  52  67    LDL/HDL Ratio 1.44  1.95      TSH          9/25/2024    15:24 1/10/2025    11:36   TSH   TSH 1.720  3.290        Data reviewed : Primary care records reviewed      Procedures           Assessment and Plan        ASSESSMENT:  Encounter Diagnoses   Name Primary?    Essential hypertension Yes    Hyperlipidemia LDL goal <70     Accelerating angina     Coronary artery disease involving native coronary artery of native heart with other form of angina pectoris          PLAN:    1.  Coronary artery disease, previous OM1 and most recent LAD PCI January 2024.  The patient has had a couple of episodes recently that are very similar to her previous anginal discomfort.  [facility].  I recommend repeating coronary angiography at this point to evaluate for obstructive disease.  He is on both calcium channel blockade and beta-blocker.  He has previously failed long-acting nitro due to intolerable headaches.  Risk benefits and alternatives were discussed with the patient.  He is agreeable to proceed.  2.  Essential hypertension-mildly elevated  today.  Recommend weight loss, continue current medical therapy and home BP monitoring  3.  Mixed hyperlipidemia, controlled, continue statin therapy    Follow-up will be determined after cardiac catheterization      Patient was given instructions and counseling regarding his condition or for health maintenance advice. Please see specific information pulled into the AVS if appropriate.             RISA Cordero MD  2/28/2025    11:51 EST

## 2025-03-03 ENCOUNTER — TELEPHONE (OUTPATIENT)
Dept: CARDIOLOGY | Facility: CLINIC | Age: 55
End: 2025-03-03
Payer: COMMERCIAL

## 2025-03-03 NOTE — TELEPHONE ENCOUNTER
I spoke to patient and gave an appointment on 03/06/25 for [facility]. Patient was instructed to have a  for the day of the procedure and to arrive at the main entrance registration area in the pavilion. Patient was educated about stent placement and informed that in the event of stent placement, the patient will be required to stay overnight for observation. Patient was instructed to continue all other medications as usual. Patient was instructed to have no solid food or milk for 6 hours prior to scheduled arrival time, clear liquids only for 6 hours prior up to 2 hours prior to schedule arrival time, NPO for 2 hours prior to scheduled arrival time. Patient was instructed to have labs completed on the morning of the procedure. Patient is agreeable with no other questions or concerns.

## 2025-03-04 NOTE — PRE-PROCEDURE INSTRUCTIONS
PATIENT INSTRUCTED TO BE:    - NPO AFTER MIDNIGHT EXCEPT CAN HAVE SIPS OF WATER WITH HIS MEDICATION PRIOR TO PROCEDURE    -  INSTRUCTED NO LOTIONS, JEWELRY, PIERCINGS, OR DEODORANT DAY OF THE PROCEDURE    - INSTRUCTED TO TAKE THE FOLLOWING MEDICATIONS THE DAY OF SURGERY:       AS DIRECTED PER CARDIOLOGY    - INSTRUCTED PT TO FOLLOW ANY INSTRUCTIONS GIVEN BY HIS CARDIOLOGIST.    - INFORMED PT THEY ATTEMPT RADIAL APPROACH FIRST IF UNABLE TO PERFORM CATH WITH THAT APPROACH THEY WILL DO A FEMORAL APPROACH.  ALSO, INFORMED PT THEY WILL BE ON BEDREST POSTOP.  IF THE SURGEON FEELS  AN INTERVENTION OR STENTS NEEDS TO BE PLACED, HE/SHE WILL STAY OVER NIGHT FOR OBSERVATION AND MONITORING.     - INFORMED THE PATIENT HE CAN HAVE TWO VISITORS WITH HIM THE DAY OF THE PROCEDURE    - INSTRUCTED PT TO COME TO [facility] ( [address]), CAN  PARK OR SELF PARK. ENTER THE PAVILION THRU MAIN ENTRANCE, TAKE ELEVATORS TO THE FIRST FLOOR, CHECK IN AT THE DESK FOR REGISTRATION, AFTER REGISTRATION PT WILL BE TAKEN THE THE PREOP AREA FOR HIS OR HER PROCEDURE.    -ARRIVAL TIME GIVEN BY SAME DAY SURGERY, YOU WILL RECEIVE A PHONE CALL BETWEEN 1PM AND 4PM, INFORMED PT IF ARRIVAL TIME CHANGES OR ADJUSTMENTS NEED TO BE MADE IN THEIR ARRIVAL TIME, HE/SHE WOULD RECEIVE A CALL.    -INSTRUCTED PT TO COME TO [facility] TO GET THEIR LABS/ EKG DONE PRIOR TO PROCEDURE      - PATIENT VERBALIZED UNDERSTANDING

## 2025-03-06 ENCOUNTER — HOSPITAL ENCOUNTER (OUTPATIENT)
Facility: HOSPITAL | Age: 55
Discharge: HOME | End: 2025-03-07
Attending: INTERNAL MEDICINE | Admitting: INTERNAL MEDICINE
Payer: COMMERCIAL

## 2025-03-06 DIAGNOSIS — I20.0 ACCELERATING ANGINA: ICD-10-CM

## 2025-03-06 DIAGNOSIS — I25.118 CORONARY ARTERY DISEASE INVOLVING NATIVE CORONARY ARTERY OF NATIVE HEART WITH OTHER FORM OF ANGINA PECTORIS: ICD-10-CM

## 2025-03-06 PROBLEM — Z98.61 CAD S/P PERCUTANEOUS CORONARY ANGIOPLASTY: Status: ACTIVE | Noted: 2025-03-06

## 2025-03-06 PROBLEM — I25.10 CAD S/P PERCUTANEOUS CORONARY ANGIOPLASTY: Status: ACTIVE | Noted: 2025-03-06

## 2025-03-06 LAB
ANION GAP SERPL CALCULATED.3IONS-SCNC: 10.5 MMOL/L (ref 5–15)
BUN SERPL-MCNC: 21 MG/DL (ref 6–20)
BUN/CREAT SERPL: 16 (ref 7–25)
CALCIUM SPEC-SCNC: 8.9 MG/DL (ref 8.6–10.5)
CHLORIDE SERPL-SCNC: 103 MMOL/L (ref 98–107)
CO2 SERPL-SCNC: 25.5 MMOL/L (ref 22–29)
CREAT SERPL-MCNC: 1.31 MG/DL (ref 0.76–1.27)
DEPRECATED RDW RBC AUTO: 41.4 FL (ref 37–54)
EGFRCR SERPLBLD CKD-EPI 2021: 64.7 ML/MIN/1.73
ERYTHROCYTE [DISTWIDTH] IN BLOOD BY AUTOMATED COUNT: 12.8 % (ref 12.3–15.4)
GLUCOSE SERPL-MCNC: 93 MG/DL (ref 65–99)
HCT VFR BLD AUTO: 40 % (ref 37.5–51)
HGB BLD-MCNC: 14.2 G/DL (ref 13–17.7)
INR PPP: 0.95 (ref 0.86–1.15)
MCH RBC QN AUTO: 31.5 PG (ref 26.6–33)
MCHC RBC AUTO-ENTMCNC: 35.5 G/DL (ref 31.5–35.7)
MCV RBC AUTO: 88.7 FL (ref 79–97)
PLATELET # BLD AUTO: 321 10*3/MM3 (ref 140–450)
PMV BLD AUTO: 8.8 FL (ref 6–12)
POTASSIUM SERPL-SCNC: 3.9 MMOL/L (ref 3.5–5.2)
PROTHROMBIN TIME: 13.1 SECONDS (ref 11.8–14.9)
RBC # BLD AUTO: 4.51 10*6/MM3 (ref 4.14–5.8)
SODIUM SERPL-SCNC: 139 MMOL/L (ref 136–145)
WBC NRBC COR # BLD AUTO: 7.63 10*3/MM3 (ref 3.4–10.8)

## 2025-03-06 PROCEDURE — C1753 CATH, INTRAVAS ULTRASOUND: HCPCS | Performed by: INTERNAL MEDICINE

## 2025-03-06 PROCEDURE — C9600 PERC DRUG-EL COR STENT SING: HCPCS | Performed by: INTERNAL MEDICINE

## 2025-03-06 PROCEDURE — 93458 L HRT ARTERY/VENTRICLE ANGIO: CPT | Performed by: INTERNAL MEDICINE

## 2025-03-06 PROCEDURE — C1769 GUIDE WIRE: HCPCS | Performed by: INTERNAL MEDICINE

## 2025-03-06 PROCEDURE — C1874 STENT, COATED/COV W/DEL SYS: HCPCS | Performed by: INTERNAL MEDICINE

## 2025-03-06 PROCEDURE — 25810000003 SODIUM CHLORIDE 0.9 % SOLUTION: Performed by: INTERNAL MEDICINE

## 2025-03-06 PROCEDURE — 92978 ENDOLUMINL IVUS OCT C 1ST: CPT | Performed by: INTERNAL MEDICINE

## 2025-03-06 PROCEDURE — 25510000001 IOPAMIDOL PER 1 ML: Performed by: INTERNAL MEDICINE

## 2025-03-06 PROCEDURE — 85610 PROTHROMBIN TIME: CPT | Performed by: INTERNAL MEDICINE

## 2025-03-06 PROCEDURE — 94799 UNLISTED PULMONARY SVC/PX: CPT

## 2025-03-06 PROCEDURE — C1894 INTRO/SHEATH, NON-LASER: HCPCS | Performed by: INTERNAL MEDICINE

## 2025-03-06 PROCEDURE — C1725 CATH, TRANSLUMIN NON-LASER: HCPCS | Performed by: INTERNAL MEDICINE

## 2025-03-06 PROCEDURE — 25010000002 LIDOCAINE 2% SOLUTION: Performed by: INTERNAL MEDICINE

## 2025-03-06 PROCEDURE — 25010000002 FENTANYL CITRATE (PF) 50 MCG/ML SOLUTION: Performed by: INTERNAL MEDICINE

## 2025-03-06 PROCEDURE — 25010000002 HEPARIN (PORCINE) PER 1000 UNITS: Performed by: INTERNAL MEDICINE

## 2025-03-06 PROCEDURE — C1887 CATHETER, GUIDING: HCPCS | Performed by: INTERNAL MEDICINE

## 2025-03-06 PROCEDURE — 25010000002 MIDAZOLAM PER 1MG: Performed by: INTERNAL MEDICINE

## 2025-03-06 PROCEDURE — 80048 BASIC METABOLIC PNL TOTAL CA: CPT | Performed by: INTERNAL MEDICINE

## 2025-03-06 PROCEDURE — 85027 COMPLETE CBC AUTOMATED: CPT | Performed by: INTERNAL MEDICINE

## 2025-03-06 PROCEDURE — 92928 PRQ TCAT PLMT NTRAC ST 1 LES: CPT | Performed by: INTERNAL MEDICINE

## 2025-03-06 DEVICE — XIENCE SKYPOINT™ EVEROLIMUS ELUTING CORONARY STENT SYSTEM 2.50 MM X 23 MM / RAPID-EXCHANGE
Type: IMPLANTABLE DEVICE | Status: FUNCTIONAL
Brand: XIENCE SKYPOINT™

## 2025-03-06 RX ORDER — ASPIRIN 81 MG/1
81 TABLET, CHEWABLE ORAL DAILY
Status: DISCONTINUED | OUTPATIENT
Start: 2025-03-07 | End: 2025-03-07 | Stop reason: HOSPADM

## 2025-03-06 RX ORDER — CARVEDILOL 12.5 MG/1
12.5 TABLET ORAL 2 TIMES DAILY WITH MEALS
COMMUNITY

## 2025-03-06 RX ORDER — LIDOCAINE HYDROCHLORIDE 20 MG/ML
INJECTION, SOLUTION INFILTRATION; PERINEURAL
Status: DISCONTINUED | OUTPATIENT
Start: 2025-03-06 | End: 2025-03-06 | Stop reason: HOSPADM

## 2025-03-06 RX ORDER — HYDROCHLOROTHIAZIDE 25 MG/1
12.5 TABLET ORAL DAILY
COMMUNITY

## 2025-03-06 RX ORDER — SODIUM CHLORIDE 9 MG/ML
40 INJECTION, SOLUTION INTRAVENOUS AS NEEDED
Status: DISCONTINUED | OUTPATIENT
Start: 2025-03-06 | End: 2025-03-06 | Stop reason: HOSPADM

## 2025-03-06 RX ORDER — CLOPIDOGREL BISULFATE 75 MG/1
75 TABLET ORAL DAILY
Status: DISCONTINUED | OUTPATIENT
Start: 2025-03-07 | End: 2025-03-07 | Stop reason: HOSPADM

## 2025-03-06 RX ORDER — MIDAZOLAM HYDROCHLORIDE 2 MG/2ML
INJECTION, SOLUTION INTRAMUSCULAR; INTRAVENOUS
Status: DISCONTINUED | OUTPATIENT
Start: 2025-03-06 | End: 2025-03-06 | Stop reason: HOSPADM

## 2025-03-06 RX ORDER — ATORVASTATIN CALCIUM 40 MG/1
80 TABLET, FILM COATED ORAL DAILY
Status: DISCONTINUED | OUTPATIENT
Start: 2025-03-07 | End: 2025-03-07 | Stop reason: HOSPADM

## 2025-03-06 RX ORDER — CARVEDILOL 6.25 MG/1
6.25 TABLET ORAL 2 TIMES DAILY
Status: DISCONTINUED | OUTPATIENT
Start: 2025-03-06 | End: 2025-03-07 | Stop reason: HOSPADM

## 2025-03-06 RX ORDER — NITROGLYCERIN 0.4 MG/1
0.4 TABLET SUBLINGUAL
Status: DISCONTINUED | OUTPATIENT
Start: 2025-03-06 | End: 2025-03-07 | Stop reason: HOSPADM

## 2025-03-06 RX ORDER — LEVOTHYROXINE SODIUM 75 UG/1
75 TABLET ORAL DAILY
Status: DISCONTINUED | OUTPATIENT
Start: 2025-03-06 | End: 2025-03-07 | Stop reason: HOSPADM

## 2025-03-06 RX ORDER — IOPAMIDOL 755 MG/ML
INJECTION, SOLUTION INTRAVASCULAR
Status: DISCONTINUED | OUTPATIENT
Start: 2025-03-06 | End: 2025-03-06 | Stop reason: HOSPADM

## 2025-03-06 RX ORDER — CETIRIZINE HYDROCHLORIDE 10 MG/1
10 TABLET ORAL DAILY
Status: DISCONTINUED | OUTPATIENT
Start: 2025-03-07 | End: 2025-03-07 | Stop reason: HOSPADM

## 2025-03-06 RX ORDER — VERAPAMIL HYDROCHLORIDE 2.5 MG/ML
INJECTION, SOLUTION INTRAVENOUS
Status: DISCONTINUED | OUTPATIENT
Start: 2025-03-06 | End: 2025-03-06 | Stop reason: HOSPADM

## 2025-03-06 RX ORDER — ACETAMINOPHEN 325 MG/1
650 TABLET ORAL EVERY 4 HOURS PRN
Status: DISCONTINUED | OUTPATIENT
Start: 2025-03-06 | End: 2025-03-07 | Stop reason: HOSPADM

## 2025-03-06 RX ORDER — SODIUM CHLORIDE 0.9 % (FLUSH) 0.9 %
10 SYRINGE (ML) INJECTION EVERY 12 HOURS SCHEDULED
Status: DISCONTINUED | OUTPATIENT
Start: 2025-03-06 | End: 2025-03-06 | Stop reason: HOSPADM

## 2025-03-06 RX ORDER — BUPROPION HYDROCHLORIDE 150 MG/1
300 TABLET ORAL DAILY
Status: DISCONTINUED | OUTPATIENT
Start: 2025-03-07 | End: 2025-03-07 | Stop reason: HOSPADM

## 2025-03-06 RX ORDER — EZETIMIBE 10 MG/1
10 TABLET ORAL DAILY
COMMUNITY

## 2025-03-06 RX ORDER — AMLODIPINE BESYLATE 5 MG/1
5 TABLET ORAL DAILY
Status: DISCONTINUED | OUTPATIENT
Start: 2025-03-07 | End: 2025-03-07 | Stop reason: HOSPADM

## 2025-03-06 RX ORDER — HEPARIN SODIUM 1000 [USP'U]/ML
INJECTION, SOLUTION INTRAVENOUS; SUBCUTANEOUS
Status: DISCONTINUED | OUTPATIENT
Start: 2025-03-06 | End: 2025-03-06 | Stop reason: HOSPADM

## 2025-03-06 RX ORDER — SODIUM CHLORIDE 9 MG/ML
1 INJECTION, SOLUTION INTRAVENOUS CONTINUOUS
Status: ACTIVE | OUTPATIENT
Start: 2025-03-06 | End: 2025-03-06

## 2025-03-06 RX ORDER — FENTANYL CITRATE 50 UG/ML
INJECTION, SOLUTION INTRAMUSCULAR; INTRAVENOUS
Status: DISCONTINUED | OUTPATIENT
Start: 2025-03-06 | End: 2025-03-06 | Stop reason: HOSPADM

## 2025-03-06 RX ORDER — LOSARTAN POTASSIUM 50 MG/1
100 TABLET ORAL DAILY
Status: DISCONTINUED | OUTPATIENT
Start: 2025-03-07 | End: 2025-03-07 | Stop reason: HOSPADM

## 2025-03-06 RX ORDER — CLOPIDOGREL BISULFATE 75 MG/1
TABLET ORAL
Status: DISCONTINUED | OUTPATIENT
Start: 2025-03-06 | End: 2025-03-06 | Stop reason: HOSPADM

## 2025-03-06 RX ORDER — SODIUM CHLORIDE 0.9 % (FLUSH) 0.9 %
10 SYRINGE (ML) INJECTION AS NEEDED
Status: DISCONTINUED | OUTPATIENT
Start: 2025-03-06 | End: 2025-03-06 | Stop reason: HOSPADM

## 2025-03-06 RX ADMIN — CARVEDILOL 6.25 MG: 6.25 TABLET, FILM COATED ORAL at 20:11

## 2025-03-06 RX ADMIN — SODIUM CHLORIDE 1 ML/KG/HR: 9 INJECTION, SOLUTION INTRAVENOUS at 14:30

## 2025-03-06 NOTE — Clinical Note
First balloon inflation max pressure = 12 carmela. First balloon inflation duration = 10 seconds. Second inflation of balloon - Max pressure = 6 carmela. 2nd Inflation of balloon - Duration = 10 seconds.

## 2025-03-06 NOTE — Clinical Note
First balloon inflation max pressure = 12 carmela. First balloon inflation duration = 10 seconds. Second inflation of balloon - Max pressure = 12 carmela. 2nd Inflation of balloon - Duration = 10 seconds.

## 2025-03-06 NOTE — POST-PROCEDURE NOTE
Patient has 70 to 80% mid LAD stenosis, underwent successful IVUS assisted PCI with 2.5X 23 mm drug-eluting stent, postdilated with 2.75 mm NC balloon with residual 0 point stenosis and GAYLE-3 flow.  300 mg of Plavix was given in the Cath Lab.  Patient will continue to be on aspirin and Plavix for at least 6 months postdischarge.  Patient can follow-up with Dr. Cordero at time of discharge.  Overnight observation.  TR band applied.

## 2025-03-07 VITALS
OXYGEN SATURATION: 95 % | WEIGHT: 253.53 LBS | HEART RATE: 82 BPM | BODY MASS INDEX: 37.55 KG/M2 | RESPIRATION RATE: 18 BRPM | SYSTOLIC BLOOD PRESSURE: 125 MMHG | HEIGHT: 69 IN | TEMPERATURE: 98.8 F | DIASTOLIC BLOOD PRESSURE: 82 MMHG

## 2025-03-07 LAB
ANION GAP SERPL CALCULATED.3IONS-SCNC: 10.7 MMOL/L (ref 5–15)
BUN SERPL-MCNC: 14 MG/DL (ref 6–20)
BUN/CREAT SERPL: 11.1 (ref 7–25)
CALCIUM SPEC-SCNC: 8.9 MG/DL (ref 8.6–10.5)
CHLORIDE SERPL-SCNC: 103 MMOL/L (ref 98–107)
CHOLEST SERPL-MCNC: 106 MG/DL (ref 0–200)
CO2 SERPL-SCNC: 24.3 MMOL/L (ref 22–29)
CREAT SERPL-MCNC: 1.26 MG/DL (ref 0.76–1.27)
DEPRECATED RDW RBC AUTO: 41.9 FL (ref 37–54)
EGFRCR SERPLBLD CKD-EPI 2021: 67.8 ML/MIN/1.73
ERYTHROCYTE [DISTWIDTH] IN BLOOD BY AUTOMATED COUNT: 12.7 % (ref 12.3–15.4)
GLUCOSE SERPL-MCNC: 92 MG/DL (ref 65–99)
HBA1C MFR BLD: 5.6 % (ref 4.8–5.6)
HCT VFR BLD AUTO: 39.9 % (ref 37.5–51)
HDLC SERPL-MCNC: 31 MG/DL (ref 40–60)
HGB BLD-MCNC: 13.8 G/DL (ref 13–17.7)
LDLC SERPL CALC-MCNC: 46 MG/DL (ref 0–100)
LDLC/HDLC SERPL: 1.28 {RATIO}
MCH RBC QN AUTO: 31.4 PG (ref 26.6–33)
MCHC RBC AUTO-ENTMCNC: 34.6 G/DL (ref 31.5–35.7)
MCV RBC AUTO: 90.9 FL (ref 79–97)
PLATELET # BLD AUTO: 331 10*3/MM3 (ref 140–450)
PMV BLD AUTO: 9 FL (ref 6–12)
POTASSIUM SERPL-SCNC: 3.7 MMOL/L (ref 3.5–5.2)
QT INTERVAL: 381 MS
QTC INTERVAL: 436 MS
RBC # BLD AUTO: 4.39 10*6/MM3 (ref 4.14–5.8)
SODIUM SERPL-SCNC: 138 MMOL/L (ref 136–145)
TRIGL SERPL-MCNC: 177 MG/DL (ref 0–150)
VLDLC SERPL-MCNC: 29 MG/DL (ref 5–40)
WBC NRBC COR # BLD AUTO: 8.71 10*3/MM3 (ref 3.4–10.8)

## 2025-03-07 PROCEDURE — 85027 COMPLETE CBC AUTOMATED: CPT | Performed by: INTERNAL MEDICINE

## 2025-03-07 PROCEDURE — 83036 HEMOGLOBIN GLYCOSYLATED A1C: CPT | Performed by: INTERNAL MEDICINE

## 2025-03-07 PROCEDURE — 80061 LIPID PANEL: CPT | Performed by: INTERNAL MEDICINE

## 2025-03-07 PROCEDURE — 93005 ELECTROCARDIOGRAM TRACING: CPT | Performed by: INTERNAL MEDICINE

## 2025-03-07 PROCEDURE — 94799 UNLISTED PULMONARY SVC/PX: CPT

## 2025-03-07 PROCEDURE — 80048 BASIC METABOLIC PNL TOTAL CA: CPT | Performed by: INTERNAL MEDICINE

## 2025-03-07 RX ADMIN — BUPROPION HYDROCHLORIDE 300 MG: 150 TABLET, EXTENDED RELEASE ORAL at 10:28

## 2025-03-07 RX ADMIN — CARVEDILOL 6.25 MG: 6.25 TABLET, FILM COATED ORAL at 10:28

## 2025-03-07 RX ADMIN — LEVOTHYROXINE SODIUM 75 MCG: 75 TABLET ORAL at 10:28

## 2025-03-07 RX ADMIN — ATORVASTATIN CALCIUM 80 MG: 40 TABLET, FILM COATED ORAL at 10:28

## 2025-03-07 RX ADMIN — CETIRIZINE HYDROCHLORIDE 10 MG: 10 TABLET, FILM COATED ORAL at 10:28

## 2025-03-07 RX ADMIN — CLOPIDOGREL BISULFATE 75 MG: 75 TABLET ORAL at 10:28

## 2025-03-07 RX ADMIN — ASPIRIN 81 MG: 81 TABLET, CHEWABLE ORAL at 10:28

## 2025-03-07 RX ADMIN — LOSARTAN POTASSIUM 100 MG: 50 TABLET, FILM COATED ORAL at 10:28

## 2025-03-07 RX ADMIN — AMLODIPINE BESYLATE 5 MG: 5 TABLET ORAL at 10:28

## 2025-03-07 NOTE — PLAN OF CARE
Goal Outcome Evaluation:  Plan of Care Reviewed With: patient        Progress: improving  Outcome Evaluation: Patient is alert and oriented on room air. No complaints of pain this shift. Right radial site is CDI, no hematoma and +2 pulses. Patient was educated on importance of medication compliance and infection prevention. Patient is planning for discharge.

## 2025-03-07 NOTE — PLAN OF CARE
Goal Outcome Evaluation:  Plan of Care Reviewed With: patient        Progress: improving  Outcome Evaluation: Pt a&o x4. Uneventful shift. Right radial heart cath site is CDI, no hematoma, radial pulse is 2+. Has rested well throughout night on home CPAP machine.

## 2025-03-07 NOTE — DISCHARGE INSTR - LAB
Follow up with Roma Contreras at Dr. Smith's office on Friday, March 14th at 11:15 am.    Follow up with Dr. Cordero (Cardiology) on Monday, March 31st at 10:15 am.

## 2025-03-14 ENCOUNTER — OFFICE VISIT (OUTPATIENT)
Dept: INTERNAL MEDICINE | Facility: CLINIC | Age: 55
End: 2025-03-14
Payer: COMMERCIAL

## 2025-03-14 VITALS
HEIGHT: 69 IN | OXYGEN SATURATION: 96 % | HEART RATE: 72 BPM | WEIGHT: 252.8 LBS | DIASTOLIC BLOOD PRESSURE: 72 MMHG | TEMPERATURE: 96.8 F | BODY MASS INDEX: 37.44 KG/M2 | SYSTOLIC BLOOD PRESSURE: 128 MMHG

## 2025-03-14 DIAGNOSIS — I25.110 CORONARY ARTERY DISEASE INVOLVING NATIVE CORONARY ARTERY OF NATIVE HEART WITH UNSTABLE ANGINA PECTORIS: Primary | ICD-10-CM

## 2025-03-14 DIAGNOSIS — I10 PRIMARY HYPERTENSION: ICD-10-CM

## 2025-03-14 RX ORDER — SEMAGLUTIDE 2.4 MG/.75ML
2.4 INJECTION, SOLUTION SUBCUTANEOUS WEEKLY
Qty: 6 ML | Refills: 1 | Status: SHIPPED | OUTPATIENT
Start: 2025-03-14

## 2025-03-14 RX ORDER — LEVOTHYROXINE SODIUM 75 UG/1
75 TABLET ORAL DAILY
Qty: 90 TABLET | Refills: 0 | Status: CANCELLED | OUTPATIENT
Start: 2025-03-14 | End: 2025-06-12

## 2025-03-14 RX ORDER — SEMAGLUTIDE 2.4 MG/.75ML
2.4 INJECTION, SOLUTION SUBCUTANEOUS WEEKLY
Qty: 6 ML | Refills: 1 | Status: CANCELLED | OUTPATIENT
Start: 2025-03-14

## 2025-03-14 RX ORDER — LEVOTHYROXINE SODIUM 75 UG/1
75 TABLET ORAL DAILY
Qty: 90 TABLET | Refills: 0 | Status: SHIPPED | OUTPATIENT
Start: 2025-03-14 | End: 2025-06-12

## 2025-03-14 NOTE — PROGRESS NOTES
Transitional Care Follow Up Visit  Subjective     Yousuf is a 54 y.o. male who presents for a transitional care management visit.    Within 48 business hours after discharge our office contacted him via telephone to coordinate his care and needs.      I reviewed and discussed the details of that call along with the discharge summary, hospital problems, inpatient lab results, inpatient diagnostic studies, and consultation reports with Yousuf.     Current outpatient and discharge medications have been reconciled for the patient.  Reviewed by: oRma Contreras PA-C          1/13/2024     8:57 PM   Date of TCM Phone Call   [facility]   Date of Admission 1/11/2024   Date of Discharge 1/13/2024   Discharge Disposition Home or Self Care       Risk for Readmission (LACE) Score: 1 (3/7/2025  6:00 AM)      History of Present Illness   Patient mated on 3-6 for accelerating angina.  Patient had an elective cardiac catheterization.  Showed mid/distal significant LAD stenosis with widely patent stents in the proximal LAD and circumflex.  PCI performed on the LAD.  Patient was observed overnight with no complications and was discharged home.  No chest pain since discharge  Has cut down on red meats. He has been eating better.   He is avoiding processed foods.  Pt states since surgery he has had bilateral tingling in arms.   States he can move neck, shoulder, elbows, wrists normally       Course During Hospital Stay The following information was reviewed by: Roma Contreras PA-C on 03/14/2025     The following portions of the patient's history were reviewed and updated as appropriate: allergies, current medications, past family history, past medical history, past social history, past surgical history, and problem list.     Vitals:    03/14/25 1135   BP: 128/72   BP Location: Left arm   Patient Position: Sitting   Cuff Size: Large Adult   Pulse: 72   Temp: 96.8 °F (36 °C)   TempSrc: Temporal   SpO2: 96%   Weight:  "115 kg (252 lb 12.8 oz)   Height: 175.3 cm (69.02\")       Review of Systems    Objective   Physical Exam  Vitals reviewed.   Constitutional:       Appearance: Normal appearance.   HENT:      Head: Normocephalic and atraumatic.      Nose: Nose normal.      Mouth/Throat:      Mouth: Mucous membranes are moist.   Eyes:      Extraocular Movements: Extraocular movements intact.      Conjunctiva/sclera: Conjunctivae normal.      Pupils: Pupils are equal, round, and reactive to light.   Cardiovascular:      Rate and Rhythm: Normal rate and regular rhythm.   Pulmonary:      Effort: Pulmonary effort is normal.      Breath sounds: Normal breath sounds.   Abdominal:      General: Abdomen is flat. Bowel sounds are normal.      Palpations: Abdomen is soft.   Musculoskeletal:         General: Normal range of motion.   Neurological:      General: No focal deficit present.      Mental Status: He is alert and oriented to person, place, and time.   Psychiatric:         Mood and Affect: Mood normal.           Assessment & Plan     Diagnoses and all orders for this visit:    1. Coronary artery disease involving native coronary artery of native heart with unstable angina pectoris (Primary)    2. Primary hypertension      Reviewed discharge summary. Pt will cont meds as rx by cardiology. BP WNL. Discussed ddx tingling in arms, will monitor for resolution at upcoming f/u. Pt will let us know if sx change/worsen. Pt understands and agrees with plan.    Follow Up     Return if symptoms worsen or fail to improve.                 "

## 2025-03-17 ENCOUNTER — PRIOR AUTHORIZATION (OUTPATIENT)
Dept: INTERNAL MEDICINE | Facility: CLINIC | Age: 55
End: 2025-03-17
Payer: COMMERCIAL

## 2025-03-17 NOTE — TELEPHONE ENCOUNTER
Wegovy 2.4MG/0.75ML auto-injectors    After reviewing the [facility] Health Benefit Plan's established and approved criteria for this drug  with your prescriber, the request was approved. As long as you remain covered by your  prescription drug plan and there are no changes to your plan benefits, such as the approved  drug no longer being on the formulary, the request is approved for the following time period:  03/17/2025 - 03/17/2026

## 2025-03-31 ENCOUNTER — OFFICE VISIT (OUTPATIENT)
Dept: CARDIOLOGY | Facility: CLINIC | Age: 55
End: 2025-03-31
Payer: COMMERCIAL

## 2025-03-31 VITALS
WEIGHT: 249.4 LBS | SYSTOLIC BLOOD PRESSURE: 127 MMHG | HEART RATE: 77 BPM | DIASTOLIC BLOOD PRESSURE: 80 MMHG | OXYGEN SATURATION: 96 % | BODY MASS INDEX: 36.94 KG/M2 | HEIGHT: 69 IN

## 2025-03-31 DIAGNOSIS — I25.10 CORONARY ARTERY DISEASE INVOLVING NATIVE CORONARY ARTERY OF NATIVE HEART WITHOUT ANGINA PECTORIS: Primary | ICD-10-CM

## 2025-03-31 DIAGNOSIS — E78.5 HYPERLIPIDEMIA LDL GOAL <70: ICD-10-CM

## 2025-03-31 DIAGNOSIS — I10 ESSENTIAL HYPERTENSION: ICD-10-CM

## 2025-03-31 PROCEDURE — 99214 OFFICE O/P EST MOD 30 MIN: CPT | Performed by: INTERNAL MEDICINE

## 2025-03-31 NOTE — PROGRESS NOTES
Chief Complaint  Post Cath follow up, Hypertension, Hyperlipidemia, Coronary Artery Disease, and Follow-up    Subjective            Yousuf Em presents to [facility] CARDIOLOGY      Yousuf is here for follow-up evaluation management of coronary artery disease with previous circumflex PCI 2022, IFR guided LAD PCI January 2024, hypertension, mixed hyperlipidemia.  He had recent repeat cardiac catheterization due to recurrent anginal chest discomfort which showed an 80% LAD stenosis.  He had repeat PCI to the LAD.  Since then he is doing well.  He denies angina.  He is compliant with his medical therapy.    PMH  Past Medical History:   Diagnosis Date    ADHD (attention deficit hyperactivity disorder) 2008    Allergic 2000    AR (allergic rhinitis)     Arthritis     Carotid artery occlusion 2022    Colitis     Colon polyp 2005    Coronary artery disease     Depression     ETD (eustachian tube dysfunction)     Forgetfulness     Hearing loss     High cholesterol     Hyperlipemia     Hypertension     Hypothyroidism 2005    Inflammatory bowel disease 2005    Labral tear of long head of biceps tendon, initial encounter 09/30/2015    Labral tear of shoulder, left, sequela 09/25/2015    Myocardial infarction     Obesity 2010    Right lateral epicondylitis 10/21/2016    Seasonal allergies     Sleep apnea     Testosterone deficiency 2010    Thyroid disorder     Vitamin D deficiency 2024         SURGICALHX  Past Surgical History:   Procedure Laterality Date    CARDIAC CATHETERIZATION N/A 05/05/2022    Procedure: Left Heart Cath;  Surgeon: Samy Kaur MD;  Location: [facility] CATH INVASIVE LOCATION;  Service: Cardiovascular;  Laterality: N/A;    CARDIAC CATHETERIZATION N/A 05/05/2022    Procedure: Percutaneous Coronary Intervention;  Surgeon: Samy Kaur MD;  Location: [facility] CATH INVASIVE LOCATION;  Service: Cardiovascular;  Laterality: N/A;    CARDIAC CATHETERIZATION N/A 01/12/2024    Procedure: Left  Heart Cath;  Surgeon: Nader Huntley MD;  Location: [facility] CATH INVASIVE LOCATION;  Service: Cardiology;  Laterality: N/A;    CARDIAC CATHETERIZATION N/A 03/06/2025    Procedure: Left Heart Cath;  Surgeon: RISA Cordero MD;  Location: [facility] CATH INVASIVE LOCATION;  Service: Cardiology;  Laterality: N/A;    CARDIAC CATHETERIZATION N/A 03/06/2025    Procedure: Percutaneous Coronary Intervention;  Surgeon: Dane Villa MD;  Location: [facility] CATH INVASIVE LOCATION;  Service: Cardiovascular;  Laterality: N/A;    CAROTID STENT  5/5/2022    CHOLECYSTECTOMY  2000    COLONOSCOPY      EAR TUBES      GALLBLADDER SURGERY      OTHER SURGICAL HISTORY      JOINT SURGERY        SOC  Social History     Socioeconomic History    Marital status: Single   Tobacco Use    Smoking status: Never     Passive exposure: Past (childhood)    Smokeless tobacco: Never   Vaping Use    Vaping status: Never Used   Substance and Sexual Activity    Alcohol use: Never    Drug use: Never    Sexual activity: Not Currently     Partners: Female     Birth control/protection: None, Birth control pill         FAMHX  Family History   Problem Relation Age of Onset    Arthritis Mother     Cancer Mother     Heart disease Mother     Diabetes Mother     Hypertension Mother     Kidney disease Mother         Stage3    Heart attack Mother     Dementia Mother     Heart disease Father     Hypertension Father     Heart attack Father     Heart disease Sister     Migraines Sister     Stroke Maternal Grandmother     Malig Hyperthermia Neg Hx           ALLERGY  No Known Allergies     MEDSCURRENT    Current Outpatient Medications:     amLODIPine (NORVASC) 5 MG tablet, Take 1 tablet by mouth Daily., Disp: 90 tablet, Rfl: 1    aspirin 81 MG chewable tablet, Chew 1 tablet Daily., Disp: , Rfl:     atorvastatin (LIPITOR) 80 MG tablet, Take 1 tablet by mouth Daily., Disp: 90 tablet, Rfl: 3    buPROPion XL (Wellbutrin XL) 300 MG 24 hr tablet, Take 1 tablet by mouth  "Daily., Disp: , Rfl:     carvedilol (COREG) 12.5 MG tablet, Take 1 tablet by mouth 2 (Two) Times a Day With Meals., Disp: , Rfl:     cetirizine (zyrTEC) 10 MG tablet, Take 1 tablet by mouth Daily., Disp: , Rfl:     clopidogrel (PLAVIX) 75 MG tablet, Take 1 tablet by mouth Daily., Disp: , Rfl:     desvenlafaxine (PRISTIQ) 50 MG 24 hr tablet, Take 1 tablet by mouth Daily., Disp: 90 tablet, Rfl: 1    ezetimibe (ZETIA) 10 MG tablet, Take 1 tablet by mouth Daily., Disp: , Rfl:     hydroCHLOROthiazide 25 MG tablet, Take 0.5 tablets by mouth Daily., Disp: , Rfl:     levothyroxine (SYNTHROID, LEVOTHROID) 75 MCG tablet, Take 1 tablet by mouth Daily for 90 days., Disp: 90 tablet, Rfl: 0    losartan (COZAAR) 100 MG tablet, Take 1 tablet by mouth once daily, Disp: 90 tablet, Rfl: 0    nitroglycerin (NITROSTAT) 0.4 MG SL tablet, Place 1 tablet under the tongue Every 5 (Five) Minutes As Needed for Chest Pain (Do not take more than 3 at one time. Go to ER or call 911 if chest pain persists). Take no more than 3 doses in 15 minutes., Disp: 30 tablet, Rfl: 1    Semaglutide-Weight Management (Wegovy) 2.4 MG/0.75ML solution auto-injector, Inject 0.75 mL under the skin into the appropriate area as directed 1 (One) Time Per Week., Disp: 6 mL, Rfl: 1      Review of Systems   Cardiovascular:  Negative for chest pain and dyspnea on exertion.        Objective     /80 (BP Location: Left arm, Patient Position: Sitting, Cuff Size: Large Adult)   Pulse 77   Ht 175.3 cm (69.02\")   Wt 113 kg (249 lb 6.4 oz)   SpO2 96%   BMI 36.81 kg/m²       General Appearance:   well developed  well nourished  HENT:   oropharynx moist  lips not cyanotic  Neck:  thyroid not enlarged  supple  Respiratory:  no respiratory distress  normal breath sounds  no rales  Cardiovascular:  no jugular venous distention  regular rhythm  apical impulse normal  S1 normal, S2 normal  no S3, no S4   no murmur  no rub, no thrill  carotid pulses normal; no bruit  pedal " pulses normal  lower extremity edema: none    Musculoskeletal:  no clubbing of fingers.   normocephalic, head atraumatic  Skin:   warm, dry  Psychiatric:  judgement and insight appropriate  normal mood and affect      Result Review :     The following data was reviewed by: Yovani Cordero MD on 01/17/2024:    CMP          1/10/2025    11:36 3/6/2025    07:22 3/7/2025    04:25   CMP   Glucose 74  93  92    BUN 14  21  14    Creatinine 1.33  1.31  1.26    EGFR 63.5  64.7  67.8    Sodium 136  139  138    Potassium 4.2  3.9  3.7    Chloride 101  103  103    Calcium 9.7  8.9  8.9    Total Protein 8.0      Albumin 4.5      Globulin 3.5      Total Bilirubin 0.6      Alkaline Phosphatase 117      AST (SGOT) 22      ALT (SGPT) 28      Albumin/Globulin Ratio 1.3      BUN/Creatinine Ratio 10.5  16.0  11.1    Anion Gap 9.0  10.5  10.7      CBC          1/10/2025    11:36 3/6/2025    07:22 3/7/2025    04:25   CBC   WBC 7.74  7.63  8.71    RBC 4.63  4.51  4.39    Hemoglobin 14.7  14.2  13.8    Hematocrit 42.3  40.0  39.9    MCV 91.4  88.7  90.9    MCH 31.7  31.5  31.4    MCHC 34.8  35.5  34.6    RDW 12.9  12.8  12.7    Platelets 357  321  331      Lipid Panel          9/25/2024    15:24 1/10/2025    11:36 3/7/2025    04:25   Lipid Panel   Total Cholesterol 115  122  106    Triglycerides 197  124  177    HDL Cholesterol 31  33  31    VLDL Cholesterol 32  22  29    LDL Cholesterol  52  67  46    LDL/HDL Ratio 1.44  1.95  1.28      TSH          9/25/2024    15:24 1/10/2025    11:36   TSH   TSH 1.720  3.290        Data reviewed : Primary care records reviewed      Procedures           Assessment and Plan        ASSESSMENT:  Encounter Diagnoses   Name Primary?    Coronary artery disease involving native coronary artery of native heart without angina pectoris Yes    Essential hypertension     Hyperlipidemia LDL goal <70          PLAN:    1.  Coronary artery disease, previous OM1 and LAD PCI January 2024.  Recent LAD PCI  March 2025.  He is not having angina.  Continue secondary prevention medical therapy.  2.  Essential hypertension-stable, continue current medical therapy  3.  Mixed hyperlipidemia, controlled, continue statin therapy    Routine follow-up will be arranged      Patient was given instructions and counseling regarding his condition or for health maintenance advice. Please see specific information pulled into the AVS if appropriate.             RISA Cordero MD  3/31/2025    10:44 EDT

## 2025-04-02 LAB
QT INTERVAL: 381 MS
QTC INTERVAL: 436 MS

## 2025-04-11 ENCOUNTER — OFFICE VISIT (OUTPATIENT)
Dept: INTERNAL MEDICINE | Facility: CLINIC | Age: 55
End: 2025-04-11
Payer: COMMERCIAL

## 2025-04-11 VITALS
WEIGHT: 247 LBS | BODY MASS INDEX: 36.58 KG/M2 | HEIGHT: 69 IN | HEART RATE: 75 BPM | DIASTOLIC BLOOD PRESSURE: 68 MMHG | RESPIRATION RATE: 20 BRPM | OXYGEN SATURATION: 96 % | SYSTOLIC BLOOD PRESSURE: 118 MMHG | TEMPERATURE: 97.4 F

## 2025-04-11 DIAGNOSIS — Z98.61 CAD S/P PERCUTANEOUS CORONARY ANGIOPLASTY: ICD-10-CM

## 2025-04-11 DIAGNOSIS — I25.10 CAD S/P PERCUTANEOUS CORONARY ANGIOPLASTY: ICD-10-CM

## 2025-04-11 DIAGNOSIS — R09.89 BRUIT OF RIGHT CAROTID ARTERY: ICD-10-CM

## 2025-04-11 DIAGNOSIS — I10 PRIMARY HYPERTENSION: ICD-10-CM

## 2025-04-11 DIAGNOSIS — E78.2 MIXED HYPERLIPIDEMIA: Primary | ICD-10-CM

## 2025-04-11 DIAGNOSIS — Z81.8 FAMILY HISTORY OF DEMENTIA: ICD-10-CM

## 2025-04-11 DIAGNOSIS — R41.3 MEMORY LOSS: ICD-10-CM

## 2025-04-11 PROBLEM — Z00.00 ANNUAL PHYSICAL EXAM: Status: RESOLVED | Noted: 2023-06-26 | Resolved: 2025-04-11

## 2025-04-11 PROBLEM — R07.9 CHEST PAIN: Status: RESOLVED | Noted: 2024-01-11 | Resolved: 2025-04-11

## 2025-04-11 PROBLEM — R07.9 CHEST PAIN, UNSPECIFIED TYPE: Status: RESOLVED | Noted: 2022-05-05 | Resolved: 2025-04-11

## 2025-04-11 PROCEDURE — 99214 OFFICE O/P EST MOD 30 MIN: CPT | Performed by: INTERNAL MEDICINE

## 2025-04-11 NOTE — PROGRESS NOTES
Chief Complaint  Hypothyroidism (3 mo f/u ), Hypertension, Hyperlipidemia, and Depression      Subjective      History of Present Illness  The patient presents for evaluation of jaw pain, upper abdominal pain, arm numbness and tingling, memory issues, and weight management.    He experienced jaw pain during sleep, accompanied by mild chest tightness and significant upper abdominal discomfort. Had LAD stent placed after this.  Doing much better now. This was his third episode, with the previous one 14 months ago. No chest pain since discharge, but occasional muscular discomfort in the pectoral region. [facility] Pl[facility]. Family history of heart disease; father had a heart attack at 49 and  at 59. Never smoked but exposed to secondhand smoke from parents. Mental health has improved. Self-administered nitroglycerin alleviated symptoms within 5-10 minutes. Stent placed in LAD; hospitalized overnight with minor chest tightness.    Reports arm numbness during sleep, particularly when lying on it or using a pillow. Symptom had resolved but recently recurred, occasionally extending from shoulder to fingertips, more often confined to the hand. Intermittent neck pain not associated with numbness and tingling. No neck injuries. Diagn[facility] with [facility]pal [facility] 10 years ago; underwent EMG testing and provided wrist splints. Symptoms resolved.    Engaging in resistance training and using [employer] karol to monitor dietary intake. Weight loss goal of 175 pounds within a year. Dietary modifications include reducing processed food intake and eliminating red meat. Not taking a multivitamin but consumes vitamin-fortified shake and Naked drinks.    Reports blurry vision in both eyes and visual disturbances (spots or sparkles) when blood pressure is elevated. Blood pressure readings at home within normal limits. Prior to last episode, blood pressure was increasing (148/94 and 154/98).    Scheduled  "neurology appointment in June 2025. No family history of dementia.    Reports leg swelling when wearing tight socks, not painful. History of chronic strep throat and ear infections during childhood; had tympanostomy tubes placed.             Objective   Vital Signs:   Vitals:    04/11/25 1515   BP: 118/68   BP Location: Left arm   Patient Position: Sitting   Cuff Size: Adult   Pulse: 75   Resp: 20   Temp: 97.4 °F (36.3 °C)   TempSrc: Temporal   SpO2: 96%   Weight: 112 kg (247 lb)   Height: 175.3 cm (69.02\")     Body mass index is 36.46 kg/m².    Wt Readings from Last 3 Encounters:   04/11/25 112 kg (247 lb)   03/31/25 113 kg (249 lb 6.4 oz)   03/14/25 115 kg (252 lb 12.8 oz)     BP Readings from Last 3 Encounters:   04/11/25 118/68   03/31/25 127/80   03/14/25 128/72       Health Maintenance   Topic Date Due    COVID-19 Vaccine (3 - 2024-25 season) 09/01/2024    INFLUENZA VACCINE  07/01/2025    ANNUAL PHYSICAL  09/25/2025    LIPID PANEL  03/07/2026    TDAP/TD VACCINES (2 - Td or Tdap) 08/03/2030    COLORECTAL CANCER SCREENING  08/10/2031    HEPATITIS C SCREENING  Completed    Pneumococcal Vaccine 50+  Completed    ZOSTER VACCINE  Completed       Physical Exam  Vitals reviewed.   Constitutional:       Appearance: Normal appearance. He is well-developed. He is obese.   HENT:      Head: Normocephalic and atraumatic.      Right Ear: External ear normal.      Left Ear: External ear normal.   Eyes:      Conjunctiva/sclera: Conjunctivae normal.      Pupils: Pupils are equal, round, and reactive to light.   Cardiovascular:      Rate and Rhythm: Normal rate and regular rhythm.      Heart sounds: No murmur heard.     No friction rub. No gallop.   Pulmonary:      Effort: Pulmonary effort is normal.      Breath sounds: Normal breath sounds. No wheezing or rhonchi.   Skin:     General: Skin is warm and dry.   Neurological:      Mental Status: He is alert and oriented to person, place, and time.   Psychiatric:         Mood and " Affect: Affect normal.         Behavior: Behavior normal.         Thought Content: Thought content normal.        Physical Exam  Possible bruit on the right side of the neck.        Result Review :  The following data was reviewed by: Angela Smith MD on 04/11/2025:         Results  - Laboratory Studies:  Reviewed last labs    - Imaging:    - CTA head and neck in January last year normal    - MRI brain last year showed chronic small vessel ischemia            Procedures            Assessment & Plan  Mixed hyperlipidemia            Primary hypertension           CAD S/P percutaneous coronary angioplasty           Bruit of right carotid artery    Orders:    US Carotid Bilateral; Future    Memory loss    Orders:    Ambulatory Referral to Neurology    Family history of dementia    Orders:    Ambulatory Referral to Neurology         Assessment & Plan  History of MI  - Given heart blockage history, conduct carotid ultrasound to rule out blockages causing arm numbness  - Blood pressure normal today  - Kidney function optimal at 1.51 five months ago  - Continue current medication regimen for cholesterol, blood pressure, anticoagulation, and cardiac remodeling  - Monitor blood pressure and maintain lifestyle modifications    Arm numbness and tingling  - Possible causes: carotid issues, electrolyte imbalances, neck problems, or carpal tunnel syndrome  - Patient prefers to postpone further testing until after weight loss  - If symptoms persist, consider EMG    Memory issues  - History of chronic small vessel ischemia could lead to cognitive impairment  - Continue current medication and lifestyle modifications  - Refer to dementia specialist  - If no improvement in three months, consider repeat MRI    Weight management  - Lost 5 pounds since last month  - Using [email or web address] karol to track diet and exercise  - Goal: 175 pounds within a year  - Continue current diet and exercise regimen    Visual disturbances  - Reports blurry  vision and seeing spots when blood pressure is high  - Blood pressure normal today  - Continue monitoring blood pressure and maintain lifestyle modifications    Follow-up  - Follow up in 3 months        Patient or patient representative verbalized consent for the use of Ambient Listening during the visit with  Angela Smith MD for chart documentation. 4/16/2025  16:31 EDT      FOLLOW UP  Return in about 3 months (around 7/11/2025).  Patient was given instructions and counseling regarding his condition or for health maintenance advice. Please see specific information pulled into the AVS if appropriate.     Angela Smith MD  04/16/25  08:23 EDT    CURRENT & DISCONTINUED MEDICATIONS  Current Outpatient Medications   Medication Instructions    amLODIPine (NORVASC) 5 mg, Oral, Daily    aspirin 81 mg, Daily    atorvastatin (LIPITOR) 80 mg, Oral, Daily    buPROPion XL (WELLBUTRIN XL) 300 mg, Daily    carvedilol (COREG) 12.5 mg, 2 Times Daily With Meals    cetirizine (ZYRTEC) 10 mg, Daily    clopidogrel (PLAVIX) 75 mg, Daily    desvenlafaxine (PRISTIQ) 50 mg, Oral, Daily    ezetimibe (ZETIA) 10 mg, Daily    hydroCHLOROthiazide 12.5 mg, Daily    levothyroxine (SYNTHROID, LEVOTHROID) 75 mcg, Oral, Daily    losartan (COZAAR) 100 mg, Oral, Daily    nitroglycerin (NITROSTAT) 0.4 mg, Sublingual, Every 5 Minutes PRN, Take no more than 3 doses in 15 minutes.    Wegovy 2.4 mg, Subcutaneous, Weekly       There are no discontinued medications.

## 2025-04-17 DIAGNOSIS — R09.89 RIGHT CAROTID BRUIT: Primary | ICD-10-CM

## 2025-05-02 ENCOUNTER — TRANSCRIBE ORDERS (OUTPATIENT)
Dept: ADMINISTRATIVE | Facility: HOSPITAL | Age: 55
End: 2025-05-02
Payer: COMMERCIAL

## 2025-05-02 DIAGNOSIS — I25.10 ARTERIOSCLEROTIC CARDIOVASCULAR DISEASE: Primary | ICD-10-CM

## 2025-05-07 ENCOUNTER — HOSPITAL ENCOUNTER (OUTPATIENT)
Facility: HOSPITAL | Age: 55
Discharge: HOME | End: 2025-05-07
Admitting: NURSE PRACTITIONER
Payer: OTHER GOVERNMENT

## 2025-05-07 DIAGNOSIS — I25.10 ARTERIOSCLEROTIC CARDIOVASCULAR DISEASE: ICD-10-CM

## 2025-05-07 PROCEDURE — 93306 TTE W/DOPPLER COMPLETE: CPT

## 2025-05-08 LAB
AORTIC DIMENSIONLESS INDEX: 0.73 (DI)
ASCENDING AORTA: 2.7 CM
AV MEAN PRESS GRAD SYS DOP V1V2: 4 MMHG
AV VMAX SYS DOP: 127 CM/SEC
BH CV ECHO MEAS - AO MAX PG: 6.5 MMHG
BH CV ECHO MEAS - AO ROOT DIAM: 3.1 CM
BH CV ECHO MEAS - AO V2 VTI: 25.3 CM
BH CV ECHO MEAS - AVA(I,D): 2.3 CM2
BH CV ECHO MEAS - EDV(CUBED): 110.6 ML
BH CV ECHO MEAS - EDV(MOD-SP2): 82.2 ML
BH CV ECHO MEAS - EDV(MOD-SP4): 97.3 ML
BH CV ECHO MEAS - EF(MOD-SP2): 64.6 %
BH CV ECHO MEAS - EF(MOD-SP4): 60.7 %
BH CV ECHO MEAS - ESV(CUBED): 32.8 ML
BH CV ECHO MEAS - ESV(MOD-SP2): 29.1 ML
BH CV ECHO MEAS - ESV(MOD-SP4): 38.2 ML
BH CV ECHO MEAS - FS: 33.3 %
BH CV ECHO MEAS - IVS/LVPW: 1 CM
BH CV ECHO MEAS - IVSD: 1.2 CM
BH CV ECHO MEAS - LA DIMENSION: 4.1 CM
BH CV ECHO MEAS - LAT PEAK E' VEL: 14.9 CM/SEC
BH CV ECHO MEAS - LV DIASTOLIC VOL/BSA (35-75): 44.4 CM2
BH CV ECHO MEAS - LV MASS(C)D: 219.1 GRAMS
BH CV ECHO MEAS - LV MAX PG: 4.1 MMHG
BH CV ECHO MEAS - LV MEAN PG: 2 MMHG
BH CV ECHO MEAS - LV SYSTOLIC VOL/BSA (12-30): 17.4 CM2
BH CV ECHO MEAS - LV V1 MAX: 101 CM/SEC
BH CV ECHO MEAS - LV V1 VTI: 18.5 CM
BH CV ECHO MEAS - LVIDD: 4.8 CM
BH CV ECHO MEAS - LVIDS: 3.2 CM
BH CV ECHO MEAS - LVOT AREA: 3.1 CM2
BH CV ECHO MEAS - LVOT DIAM: 2 CM
BH CV ECHO MEAS - LVPWD: 1.2 CM
BH CV ECHO MEAS - MED PEAK E' VEL: 10.8 CM/SEC
BH CV ECHO MEAS - MV A MAX VEL: 88.4 CM/SEC
BH CV ECHO MEAS - MV DEC SLOPE: 428 CM/SEC2
BH CV ECHO MEAS - MV DEC TIME: 0.22 SEC
BH CV ECHO MEAS - MV E MAX VEL: 92.2 CM/SEC
BH CV ECHO MEAS - MV E/A: 1.04
BH CV ECHO MEAS - RAP SYSTOLE: 5 MMHG
BH CV ECHO MEAS - RVDD: 3.4 CM
BH CV ECHO MEAS - RVSP: 21.2 MMHG
BH CV ECHO MEAS - SV(LVOT): 58.1 ML
BH CV ECHO MEAS - SV(MOD-SP2): 53.1 ML
BH CV ECHO MEAS - SV(MOD-SP4): 59.1 ML
BH CV ECHO MEAS - SVI(LVOT): 26.5 ML/M2
BH CV ECHO MEAS - SVI(MOD-SP2): 24.2 ML/M2
BH CV ECHO MEAS - SVI(MOD-SP4): 27 ML/M2
BH CV ECHO MEAS - TAPSE (>1.6): 2.19 CM
BH CV ECHO MEAS - TR MAX PG: 16.2 MMHG
BH CV ECHO MEAS - TR MAX VEL: 201 CM/SEC
BH CV ECHO MEASUREMENTS AVERAGE E/E' RATIO: 7.18
IVRT: 69 MS
LEFT ATRIUM VOLUME INDEX: 16.9 ML/M2
LV EF BIPLANE MOD: 63.6 %

## 2025-05-19 RX ORDER — AMLODIPINE BESYLATE 5 MG/1
5 TABLET ORAL DAILY
Qty: 30 TABLET | Refills: 0 | Status: SHIPPED | OUTPATIENT
Start: 2025-05-19

## 2025-05-23 ENCOUNTER — TELEPHONE (OUTPATIENT)
Dept: ENDOCRINOLOGY | Age: 55
End: 2025-05-23
Payer: COMMERCIAL

## 2025-05-23 NOTE — TELEPHONE ENCOUNTER
Called patient to try and reschedule him for a follow up appointment. Patient said not at this time, he spoke to his cardiologist and said that TRT would not be best for him at this time.

## 2025-05-30 RX ORDER — ATORVASTATIN CALCIUM 80 MG/1
80 TABLET, FILM COATED ORAL DAILY
Qty: 90 TABLET | Refills: 3 | Status: SHIPPED | OUTPATIENT
Start: 2025-05-30

## 2025-05-30 NOTE — TELEPHONE ENCOUNTER
Rx Refill Note  Requested Prescriptions     Pending Prescriptions Disp Refills    atorvastatin (LIPITOR) 80 MG tablet 90 tablet 3     Sig: Take 1 tablet by mouth Daily.        LAST OFFICE VISIT:  3/31/2025     NEXT OFFICE VISIT:  09/30/2025     Does the medication requests match the last office note:    [x] Yes   [] No    Does this refill request meet protocol details for MA to approve:     [x] Yes   [] No   [] No Protocols Provided

## 2025-06-10 ENCOUNTER — OFFICE VISIT (OUTPATIENT)
Dept: NEUROLOGY | Facility: CLINIC | Age: 55
End: 2025-06-10
Payer: COMMERCIAL

## 2025-06-10 VITALS
BODY MASS INDEX: 33.03 KG/M2 | HEIGHT: 69 IN | HEART RATE: 63 BPM | SYSTOLIC BLOOD PRESSURE: 123 MMHG | WEIGHT: 223 LBS | DIASTOLIC BLOOD PRESSURE: 76 MMHG

## 2025-06-10 DIAGNOSIS — G47.33 OBSTRUCTIVE SLEEP APNEA: ICD-10-CM

## 2025-06-10 DIAGNOSIS — F41.9 ANXIETY: ICD-10-CM

## 2025-06-10 DIAGNOSIS — G31.84 MILD COGNITIVE IMPAIRMENT: Primary | ICD-10-CM

## 2025-06-10 NOTE — PROGRESS NOTES
"Chief Complaint  Memory Loss (Short term & long term/Brain MRI completed @ [facility] 01/2024)    Subjective          Yousuf Em is a 54 y.o. male who presents to [facility] NE[facility]OLOGY & NE[facility]OS[facility]GERY  History of Present Illness      History of Present Illness  The patient presents for evaluation of memory issues.    He reports experiencing memory difficulties, which have progressively worsened over the past year. His significant other, with whom he has been in a relationship for 20 years, has also observed a decline in his memory. He describes instances where he struggles to recall recent conversations or tasks he intended to perform. Additionally, he mentions a recent incident where he had difficulty navigating to his mother's residence. He has a history of long-term memory issues, which he attributes to his time in [place]. He is currently employed at [employer] and is on medication for depression. He reports feeling down but not severely depressed. He also reports frequent awakenings at night.    He has sleep apnea and uses a CPAP machine regularly. He feels decent in the morning and does not feel exceptionally tired. There has been no change in the way he feels about his sleep for a while. He has not consulted a sleep specialist for approximately 7 to 8 years.    He recalls an episode of tongue numbness that lasted for 2 days, affecting both sides.    SOCIAL HISTORY:    Occupations: Employed at [employer]    Sleep: Reports frequent awakenings at night    FAMILY HISTORY  - Mother: Dementia, likely Alzheimer's disease, developed approximately 3-4 years ago.  - Sister: Short-term memory issues related to TIAs.    MEDICATIONS  CURRENT MEDS:  Clopidogrel  PREVIOUS MEDS:  Clonidine 12.5 mg, 25 mg Daily for 14 days      Objective   Vital Signs:   /76 (BP Location: Right arm, Patient Position: Sitting, Cuff Size: Adult)   Pulse 63   Ht 175.3 cm (69.02\")   Wt 101 kg (223 lb)   BMI 32.92 kg/m²   "   Physical Exam   Alert, fluent, phasic, follows commands well.  Mini-Mental score is 29 out of 30.  He missed the season.  Clock drawing is intact.  He is able to give me history without any difficulty.  Optic disc are normal bilaterally fields are full, EMs full directedness, facial strength is full.  No weakness of the upper or lower extremities.  Fine finger movements are intact.  There is no pronator drift.  Station gait is unremarkable.  Heart is regular rhythm normal in rate.     Results  The following results are independently reviewed and interpreted by myself.    Imaging   - MRI of the brain: No abnormalities. Minimal chronic small vessel changes. Normal white and gray matter. No signs of hardening of the arteries, previous stroke, infection, or trauma.         Assessment and Plan  Diagnoses and all orders for this visit:    1. Mild cognitive impairment (Primary)  -     ATN Profile; Future  -     Vitamin B12; Future  -     Folate; Future    2. Obstructive sleep apnea    3. Anxiety         Assessment & Plan  1. Mild cognitive impairment.  The patient's symptoms and family history suggest the possibility of Alzheimer's disease, although it is rare at his age. The MRI of his brain did not reveal any abnormalities. His current medications, including those for depression, are unlikely to contribute to his cognitive issues. A blood test will be conducted today to further investigate the cause of his cognitive impairment. If the blood test results are negative, potential causes such as depression, anxiety, or sleep-related issues will be considered.    2. Sleep apnea.  He reports using his CPAP machine religiously and feels decent in the morning without exceptional tiredness. However, it has been 7-8 years since his last check with a sleep doctor. If the blood test results are negative, a referral to a sleep specialist will be considered to ensure his sleep apnea is being managed effectively.      Total time  spent with the patient and coordinating patient care was 35 minutes.    Follow Up  No follow-ups on file.  Patient was given instructions and counseling regarding his condition or for health maintenance advice. Please see specific information pulled into the AVS if appropriate.     Patient or patient representative verbalized consent for the use of Ambient Listening during the visit with  Samy Tam MD for chart documentation. 6/10/2025  16:52 EDT

## 2025-06-11 RX ORDER — ISOSORBIDE MONONITRATE 30 MG/1
30 TABLET, EXTENDED RELEASE ORAL DAILY
Qty: 90 TABLET | Refills: 3 | Status: SHIPPED | OUTPATIENT
Start: 2025-06-11

## 2025-06-11 RX ORDER — CARVEDILOL 12.5 MG/1
12.5 TABLET ORAL 2 TIMES DAILY WITH MEALS
Qty: 180 TABLET | Refills: 3 | Status: SHIPPED | OUTPATIENT
Start: 2025-06-11

## 2025-06-12 ENCOUNTER — HOSPITAL ENCOUNTER (OUTPATIENT)
Dept: CARDIOLOGY | Facility: HOSPITAL | Age: 55
Discharge: HOME | End: 2025-06-12
Payer: COMMERCIAL

## 2025-06-12 ENCOUNTER — LAB (OUTPATIENT)
Dept: LAB | Facility: HOSPITAL | Age: 55
End: 2025-06-12
Payer: COMMERCIAL

## 2025-06-12 DIAGNOSIS — R09.89 RIGHT CAROTID BRUIT: ICD-10-CM

## 2025-06-12 DIAGNOSIS — G31.84 MILD COGNITIVE IMPAIRMENT: ICD-10-CM

## 2025-06-12 LAB
FOLATE SERPL-MCNC: >20 NG/ML (ref 4.78–24.2)
VIT B12 BLD-MCNC: 912 PG/ML (ref 211–946)

## 2025-06-12 PROCEDURE — 83520 IMMUNOASSAY QUANT NOS NONAB: CPT

## 2025-06-12 PROCEDURE — 82607 VITAMIN B-12: CPT

## 2025-06-12 PROCEDURE — 93880 EXTRACRANIAL BILAT STUDY: CPT

## 2025-06-12 PROCEDURE — 82746 ASSAY OF FOLIC ACID SERUM: CPT

## 2025-06-12 PROCEDURE — 36415 COLL VENOUS BLD VENIPUNCTURE: CPT

## 2025-06-13 LAB
BH CV XLRA MEAS LEFT CAROTID BULB EDV: 10.6 CM/SEC
BH CV XLRA MEAS LEFT CAROTID BULB PSV: 57.1 CM/SEC
BH CV XLRA MEAS LEFT DIST CCA EDV: 15.2 CM/SEC
BH CV XLRA MEAS LEFT DIST CCA PSV: 65.1 CM/SEC
BH CV XLRA MEAS LEFT DIST ICA EDV: 20.5 CM/SEC
BH CV XLRA MEAS LEFT DIST ICA PSV: 63 CM/SEC
BH CV XLRA MEAS LEFT ICA/CCA RATIO: 1.31
BH CV XLRA MEAS LEFT MID ICA EDV: 32.2 CM/SEC
BH CV XLRA MEAS LEFT MID ICA PSV: 85.2 CM/SEC
BH CV XLRA MEAS LEFT PROX CCA EDV: 18.3 CM/SEC
BH CV XLRA MEAS LEFT PROX CCA PSV: 98.8 CM/SEC
BH CV XLRA MEAS LEFT PROX ECA EDV: 14.9 CM/SEC
BH CV XLRA MEAS LEFT PROX ECA PSV: 103.9 CM/SEC
BH CV XLRA MEAS LEFT PROX ICA EDV: 21.9 CM/SEC
BH CV XLRA MEAS LEFT PROX ICA PSV: 58.5 CM/SEC
BH CV XLRA MEAS LEFT VERTEBRAL A EDV: 15.3 CM/SEC
BH CV XLRA MEAS LEFT VERTEBRAL A PSV: 40.5 CM/SEC
BH CV XLRA MEAS RIGHT CAROTID BULB EDV: 16.7 CM/SEC
BH CV XLRA MEAS RIGHT CAROTID BULB PSV: 52.2 CM/SEC
BH CV XLRA MEAS RIGHT DIST CCA EDV: 16.8 CM/SEC
BH CV XLRA MEAS RIGHT DIST CCA PSV: 70.9 CM/SEC
BH CV XLRA MEAS RIGHT DIST ICA EDV: 23.8 CM/SEC
BH CV XLRA MEAS RIGHT DIST ICA PSV: 61 CM/SEC
BH CV XLRA MEAS RIGHT ICA/CCA RATIO: 1.38
BH CV XLRA MEAS RIGHT MID ICA EDV: 23.7 CM/SEC
BH CV XLRA MEAS RIGHT MID ICA PSV: 58.9 CM/SEC
BH CV XLRA MEAS RIGHT PROX CCA EDV: 17.5 CM/SEC
BH CV XLRA MEAS RIGHT PROX CCA PSV: 86.2 CM/SEC
BH CV XLRA MEAS RIGHT PROX ECA EDV: 16.9 CM/SEC
BH CV XLRA MEAS RIGHT PROX ECA PSV: 93.5 CM/SEC
BH CV XLRA MEAS RIGHT PROX ICA EDV: 22.4 CM/SEC
BH CV XLRA MEAS RIGHT PROX ICA PSV: 97.6 CM/SEC
BH CV XLRA MEAS RIGHT VERTEBRAL A EDV: 12.7 CM/SEC
BH CV XLRA MEAS RIGHT VERTEBRAL A PSV: 40.8 CM/SEC

## 2025-06-16 RX ORDER — AMLODIPINE BESYLATE 5 MG/1
5 TABLET ORAL DAILY
Qty: 30 TABLET | Refills: 0 | Status: SHIPPED | OUTPATIENT
Start: 2025-06-16

## 2025-06-16 RX ORDER — AMLODIPINE BESYLATE 5 MG/1
5 TABLET ORAL DAILY
Qty: 30 TABLET | Refills: 0 | OUTPATIENT
Start: 2025-06-16

## 2025-06-25 LAB
A -- BETA-AMYLOID 42/40 RATIO: 0.13
AL BETA40 PLAS-MCNC: 150.51 PG/ML
AL BETA42 PLAS-MCNC: 19.13 PG/ML
ATN SUMMARY1: NORMAL
INFORMATION:: NORMAL
NFL CHAIN SERPL IA-MCNC: 2.43 PG/ML (ref 0–2.99)
P-TAU181 PLAS IA-MCNC: 0.68 PG/ML (ref 0–0.95)

## 2025-07-23 ENCOUNTER — OFFICE VISIT (OUTPATIENT)
Dept: INTERNAL MEDICINE | Facility: CLINIC | Age: 55
End: 2025-07-23
Payer: COMMERCIAL

## 2025-07-23 VITALS
BODY MASS INDEX: 32.44 KG/M2 | SYSTOLIC BLOOD PRESSURE: 124 MMHG | TEMPERATURE: 97 F | HEART RATE: 74 BPM | DIASTOLIC BLOOD PRESSURE: 76 MMHG | RESPIRATION RATE: 20 BRPM | HEIGHT: 69 IN | OXYGEN SATURATION: 98 % | WEIGHT: 219 LBS

## 2025-07-23 DIAGNOSIS — I25.10 CAD S/P PERCUTANEOUS CORONARY ANGIOPLASTY: ICD-10-CM

## 2025-07-23 DIAGNOSIS — H92.03 OTALGIA OF BOTH EARS: ICD-10-CM

## 2025-07-23 DIAGNOSIS — E06.3 HYPOTHYROIDISM DUE TO HASHIMOTO'S THYROIDITIS: ICD-10-CM

## 2025-07-23 DIAGNOSIS — F33.42 RECURRENT MAJOR DEPRESSIVE DISORDER, IN FULL REMISSION: Primary | ICD-10-CM

## 2025-07-23 DIAGNOSIS — E78.2 MIXED HYPERLIPIDEMIA: ICD-10-CM

## 2025-07-23 DIAGNOSIS — I10 PRIMARY HYPERTENSION: ICD-10-CM

## 2025-07-23 DIAGNOSIS — Z98.61 CAD S/P PERCUTANEOUS CORONARY ANGIOPLASTY: ICD-10-CM

## 2025-07-23 LAB
ALBUMIN SERPL-MCNC: 4.7 G/DL (ref 3.5–5.2)
ALBUMIN UR-MCNC: <1.2 MG/DL
ALBUMIN/GLOB SERPL: 1.5 G/DL
ALP SERPL-CCNC: 106 U/L (ref 39–117)
ALT SERPL W P-5'-P-CCNC: 45 U/L (ref 1–41)
ANION GAP SERPL CALCULATED.3IONS-SCNC: 10.2 MMOL/L (ref 5–15)
AST SERPL-CCNC: 30 U/L (ref 1–40)
BASOPHILS # BLD AUTO: 0.05 10*3/MM3 (ref 0–0.2)
BASOPHILS NFR BLD AUTO: 0.7 % (ref 0–1.5)
BILIRUB SERPL-MCNC: 0.4 MG/DL (ref 0–1.2)
BUN SERPL-MCNC: 15 MG/DL (ref 6–20)
BUN/CREAT SERPL: 11.6 (ref 7–25)
CALCIUM SPEC-SCNC: 10.1 MG/DL (ref 8.6–10.5)
CHLORIDE SERPL-SCNC: 100 MMOL/L (ref 98–107)
CHOLEST SERPL-MCNC: 133 MG/DL (ref 0–200)
CO2 SERPL-SCNC: 28.8 MMOL/L (ref 22–29)
CREAT SERPL-MCNC: 1.29 MG/DL (ref 0.76–1.27)
CREAT UR-MCNC: 64.4 MG/DL
DEPRECATED RDW RBC AUTO: 42.6 FL (ref 37–54)
EGFRCR SERPLBLD CKD-EPI 2021: 65.9 ML/MIN/1.73
EOSINOPHIL # BLD AUTO: 0.23 10*3/MM3 (ref 0–0.4)
EOSINOPHIL NFR BLD AUTO: 3.3 % (ref 0.3–6.2)
ERYTHROCYTE [DISTWIDTH] IN BLOOD BY AUTOMATED COUNT: 12.5 % (ref 12.3–15.4)
GLOBULIN UR ELPH-MCNC: 3.2 GM/DL
GLUCOSE SERPL-MCNC: 85 MG/DL (ref 65–99)
HBA1C MFR BLD: 5.3 % (ref 4.8–5.6)
HCT VFR BLD AUTO: 44.2 % (ref 37.5–51)
HDLC SERPL-MCNC: 44 MG/DL (ref 40–60)
HGB BLD-MCNC: 14.9 G/DL (ref 13–17.7)
IMM GRANULOCYTES # BLD AUTO: 0.01 10*3/MM3 (ref 0–0.05)
IMM GRANULOCYTES NFR BLD AUTO: 0.1 % (ref 0–0.5)
LDLC SERPL CALC-MCNC: 69 MG/DL (ref 0–100)
LDLC/HDLC SERPL: 1.53 {RATIO}
LYMPHOCYTES # BLD AUTO: 2.45 10*3/MM3 (ref 0.7–3.1)
LYMPHOCYTES NFR BLD AUTO: 34.8 % (ref 19.6–45.3)
MCH RBC QN AUTO: 31.3 PG (ref 26.6–33)
MCHC RBC AUTO-ENTMCNC: 33.7 G/DL (ref 31.5–35.7)
MCV RBC AUTO: 92.9 FL (ref 79–97)
MICROALBUMIN/CREAT UR: NORMAL MG/G{CREAT}
MONOCYTES # BLD AUTO: 0.66 10*3/MM3 (ref 0.1–0.9)
MONOCYTES NFR BLD AUTO: 9.4 % (ref 5–12)
NEUTROPHILS NFR BLD AUTO: 3.65 10*3/MM3 (ref 1.7–7)
NEUTROPHILS NFR BLD AUTO: 51.7 % (ref 42.7–76)
NRBC BLD AUTO-RTO: 0 /100 WBC (ref 0–0.2)
PLATELET # BLD AUTO: 323 10*3/MM3 (ref 140–450)
PMV BLD AUTO: 9.1 FL (ref 6–12)
POTASSIUM SERPL-SCNC: 3.9 MMOL/L (ref 3.5–5.2)
PROT SERPL-MCNC: 7.9 G/DL (ref 6–8.5)
RBC # BLD AUTO: 4.76 10*6/MM3 (ref 4.14–5.8)
SODIUM SERPL-SCNC: 139 MMOL/L (ref 136–145)
TRIGL SERPL-MCNC: 108 MG/DL (ref 0–150)
TSH SERPL DL<=0.05 MIU/L-ACNC: 2.98 UIU/ML (ref 0.27–4.2)
VLDLC SERPL-MCNC: 20 MG/DL (ref 5–40)
WBC NRBC COR # BLD AUTO: 7.05 10*3/MM3 (ref 3.4–10.8)

## 2025-07-23 PROCEDURE — 82570 ASSAY OF URINE CREATININE: CPT | Performed by: INTERNAL MEDICINE

## 2025-07-23 PROCEDURE — 83036 HEMOGLOBIN GLYCOSYLATED A1C: CPT | Performed by: INTERNAL MEDICINE

## 2025-07-23 PROCEDURE — 80053 COMPREHEN METABOLIC PANEL: CPT | Performed by: INTERNAL MEDICINE

## 2025-07-23 PROCEDURE — 84443 ASSAY THYROID STIM HORMONE: CPT | Performed by: INTERNAL MEDICINE

## 2025-07-23 PROCEDURE — 80061 LIPID PANEL: CPT | Performed by: INTERNAL MEDICINE

## 2025-07-23 PROCEDURE — 36415 COLL VENOUS BLD VENIPUNCTURE: CPT | Performed by: INTERNAL MEDICINE

## 2025-07-23 PROCEDURE — 85025 COMPLETE CBC W/AUTO DIFF WBC: CPT | Performed by: INTERNAL MEDICINE

## 2025-07-23 PROCEDURE — 99214 OFFICE O/P EST MOD 30 MIN: CPT | Performed by: INTERNAL MEDICINE

## 2025-07-23 PROCEDURE — 82043 UR ALBUMIN QUANTITATIVE: CPT | Performed by: INTERNAL MEDICINE

## 2025-07-23 RX ORDER — CLOBETASOL PROPIONATE 0.5 MG/G
1 OINTMENT TOPICAL 2 TIMES DAILY
Qty: 45 G | Refills: 1 | Status: SHIPPED | OUTPATIENT
Start: 2025-07-23

## 2025-07-23 NOTE — ASSESSMENT & PLAN NOTE
{Hyperlipidemia A/P Block (Optional):2526292551}    Orders:    CBC & Differential    Comprehensive Metabolic Panel    Lipid Panel    Hemoglobin A1c    Microalbumin / Creatinine Urine Ratio - Urine, Clean Catch; Future    TSH Rfx On Abnormal To Free T4; Future

## 2025-07-23 NOTE — ASSESSMENT & PLAN NOTE
{Hypertension is (optional):0970691892}    Orders:    CBC & Differential    Comprehensive Metabolic Panel    Lipid Panel    Hemoglobin A1c    Microalbumin / Creatinine Urine Ratio - Urine, Clean Catch; Future    TSH Rfx On Abnormal To Free T4; Future

## 2025-07-23 NOTE — PROGRESS NOTES
Chief Complaint  Hypertension (3 mo f/u ), Hyperlipidemia, referral  (ENT), and Rash (Small bumps all over body. First appeared about 1 month ago. They get raised and hard and are red and itchy. )      Subjective      History of Present Illness  The patient presents for evaluation of skin bumps, dizziness, chest pain, constipation, and clogged ear tubes.    He reports itchy bumps on his chest and legs for the past month. These bumps are not rashes and do not appear in multiples. He has counted approximately 37 bumps around the lower part of his abdomen and one on his back. The bumps initially appear as small red dots before worsening. He has checked for bedbugs and has not recently stayed in a hotel. No recent changes in medication or exposure to new detergents. No recent outdoor activities. Tried Benadryl cream, extra strength cream, and cortisone without relief.    Blood pressure readings at home have been satisfactory. Experiences dizziness upon standing, lasting 15-20 seconds. Currently on carvedilol and hydrochlorothiazide, which he finds helpful for his ankles. Continues to see cardiology regularly.    Discontinued Imdur due to persistent headaches but continues to experience occasional chest pain. Does not use nitroglycerin for this pain as it often occurs at rest and does not resemble heart attack symptoms. Describes pain as muscular and skeletal, very pinpointed.    Reports feeling well on current dose of Wegovy but dealing with constipation. Manages with MiraLAX, fiber gummies, and Ex-Lax once or twice a week.    Believes ear tubes are severely clogged and misaligned. Tubes were inserted by Dr. Hays at [facility].    Had a neurology test, which turned out fine. Curious if memory issues could be medicine related.    Mental health is doing well. Managing stress levels effectively. Currently working at [employer] and finds it more enjoyable than previous job. Combination of Wellbutrin and Pristiq is  "working well.    FAMILY HISTORY  - Mother: Alzheimer's disease, vascular dementia  - Father: Heart disease, first heart attack at age 40         Objective   Vital Signs:   Vitals:    07/23/25 1656   BP: 124/76   BP Location: Left arm   Patient Position: Sitting   Cuff Size: Adult   Pulse: 74   Resp: 20   Temp: 97 °F (36.1 °C)   TempSrc: Temporal   SpO2: 98%   Weight: 99.3 kg (219 lb)   Height: 175.3 cm (69.02\")     Body mass index is 32.33 kg/m².    Wt Readings from Last 3 Encounters:   07/23/25 99.3 kg (219 lb)   06/10/25 101 kg (223 lb)   04/11/25 112 kg (247 lb)     BP Readings from Last 3 Encounters:   07/23/25 124/76   06/10/25 123/76   04/11/25 118/68       Health Maintenance   Topic Date Due    COVID-19 Vaccine (3 - 2024-25 season) 08/05/2025 (Originally 9/1/2024)    ANNUAL PHYSICAL  09/25/2025    INFLUENZA VACCINE  10/01/2025    LIPID PANEL  03/07/2026    TDAP/TD VACCINES (2 - Td or Tdap) 08/03/2030    COLORECTAL CANCER SCREENING  08/10/2031    HEPATITIS C SCREENING  Completed    Pneumococcal Vaccine 50+  Completed    ZOSTER VACCINE  Completed       Physical Exam  Vitals reviewed.   Constitutional:       Appearance: Normal appearance. He is well-developed.   HENT:      Head: Normocephalic and atraumatic.      Right Ear: External ear normal.      Left Ear: External ear normal.   Eyes:      Conjunctiva/sclera: Conjunctivae normal.      Pupils: Pupils are equal, round, and reactive to light.   Cardiovascular:      Rate and Rhythm: Normal rate and regular rhythm.      Heart sounds: No murmur heard.     No friction rub. No gallop.   Pulmonary:      Effort: Pulmonary effort is normal.      Breath sounds: Normal breath sounds. No wheezing or rhonchi.   Skin:     General: Skin is warm and dry.   Neurological:      Mental Status: He is alert and oriented to person, place, and time.   Psychiatric:         Mood and Affect: Affect normal.         Behavior: Behavior normal.         Thought Content: Thought content " normal.        Physical Exam        Result Review :  The following data was reviewed by: Angela Smith MD on 07/23/2025:         Results  Reviewed last labs    - Imaging:    - MRI (01/2024): Mild chronic small vessel ischemia            Procedures            Assessment & Plan  Recurrent major depressive disorder, in full remission           Primary hypertension      Orders:    CBC & Differential    Comprehensive Metabolic Panel    Lipid Panel    Hemoglobin A1c    Microalbumin / Creatinine Urine Ratio - Urine, Clean Catch; Future    TSH Rfx On Abnormal To Free T4; Future    Mixed hyperlipidemia       Orders:    CBC & Differential    Comprehensive Metabolic Panel    Lipid Panel    Hemoglobin A1c    Microalbumin / Creatinine Urine Ratio - Urine, Clean Catch; Future    TSH Rfx On Abnormal To Free T4; Future    Hypothyroidism due to Hashimoto's thyroiditis    Orders:    CBC & Differential    Comprehensive Metabolic Panel    Lipid Panel    Hemoglobin A1c    Microalbumin / Creatinine Urine Ratio - Urine, Clean Catch; Future    TSH Rfx On Abnormal To Free T4; Future    CAD S/P percutaneous coronary angioplasty      Orders:    CBC & Differential    Comprehensive Metabolic Panel    Lipid Panel    Hemoglobin A1c    Microalbumin / Creatinine Urine Ratio - Urine, Clean Catch; Future    TSH Rfx On Abnormal To Free T4; Future    Otalgia of both ears    Orders:    Ambulatory Referral to ENT (Otolaryngology)         Assessment & Plan  Skin bumps  - Possible mite infestation or psoriasis  - Prescription for potent steroid cream  - Referral to dermatologist for biopsy if bumps reappear    Dizziness  - Likely medication side effect  - Maintain current medication regimen  - Discuss reducing Coreg dosage with cardiologist if dizziness persists    Chest pain  - Occasional, muscular and skeletal, often at rest  - Discontinued Imdur due to headaches  - Monitor symptoms and discuss with cardiologist    Constipation  - Managed with  MiraLAX, fiber gummies, and occasional Ex-Lax  - Continue measures and pause Wegovy if constipated  - Maintain nutritious diet and muscle-building exercises    Clogged ear tubes  - Referral to ENT specialist  - Recommended Dr. Tobin or Dr. Melgoza for follow-up    Memory issues  - Neurology test indicates low risk of Alzheimer's disease  - MRI shows mild chronic small vessel ischemia  - Possible medication side effects, stress, and anxiety  - Maintain healthy diet, regular physical activity, and avoid smoking    Mental health  - Stable, managing stress effectively  - Enjoys current job at [employer]  - Wellbutrin and Pristiq combination working well    Health maintenance  - Blood work to assess thyroid function    Patient or patient representative verbalized consent for the use of Ambient Listening during the visit with  Angela Smith MD for chart documentation. 7/23/2025  17:55 EDT      FOLLOW UP  No follow-ups on file.  Patient was given instructions and counseling regarding his condition or for health maintenance advice. Please see specific information pulled into the AVS if appropriate.     Angela Smith MD  07/23/25  17:55 EDT    CURRENT & DISCONTINUED MEDICATIONS  Current Outpatient Medications   Medication Instructions    amLODIPine (NORVASC) 5 mg, Oral, Daily    aspirin 81 mg, Daily    atorvastatin (LIPITOR) 80 mg, Oral, Daily    buPROPion XL (WELLBUTRIN XL) 300 mg, Daily    carvedilol (COREG) 12.5 mg, Oral, 2 Times Daily With Meals    cetirizine (ZYRTEC) 10 mg, Daily    clobetasol (TEMOVATE) 0.05 % ointment 1 Application, Topical, 2 Times Daily    clopidogrel (PLAVIX) 75 mg, Daily    desvenlafaxine (PRISTIQ) 50 mg, Oral, Daily    ezetimibe (ZETIA) 10 mg, Daily    hydroCHLOROthiazide 12.5 mg, Daily    levothyroxine (SYNTHROID, LEVOTHROID) 75 mcg, Oral, Daily    losartan (COZAAR) 100 mg, Oral, Daily    nitroglycerin (NITROSTAT) 0.4 mg, Sublingual, Every 5 Minutes PRN, Take no more than 3 doses in 15  minutes.    Wegovy 2.4 mg, Subcutaneous, Weekly       Medications Discontinued During This Encounter   Medication Reason    isosorbide mononitrate (IMDUR) 30 MG 24 hr tablet

## 2025-07-23 NOTE — ASSESSMENT & PLAN NOTE
{Coronary Artery Disease (OPTIONAL):61021}    Orders:    CBC & Differential    Comprehensive Metabolic Panel    Lipid Panel    Hemoglobin A1c    Microalbumin / Creatinine Urine Ratio - Urine, Clean Catch; Future    TSH Rfx On Abnormal To Free T4; Future

## 2025-08-12 RX ORDER — AMLODIPINE BESYLATE 5 MG/1
5 TABLET ORAL DAILY
Qty: 30 TABLET | Refills: 0 | Status: SHIPPED | OUTPATIENT
Start: 2025-08-12

## 2025-08-21 ENCOUNTER — APPOINTMENT (OUTPATIENT)
Dept: GENERAL RADIOLOGY | Facility: HOSPITAL | Age: 55
End: 2025-08-21
Payer: OTHER GOVERNMENT

## 2025-08-21 ENCOUNTER — HOSPITAL ENCOUNTER (EMERGENCY)
Facility: HOSPITAL | Age: 55
Discharge: HOME | End: 2025-08-21
Attending: EMERGENCY MEDICINE
Payer: OTHER GOVERNMENT

## 2025-08-22 DIAGNOSIS — R07.2 PRECORDIAL PAIN: Primary | ICD-10-CM

## (undated) DEVICE — Device

## (undated) DEVICE — SENSR O2 OXIMAX FNGR ADHS A/ 1P/U

## (undated) DEVICE — CATH F4 INF 3DRC 100CM: Brand: INFINITI

## (undated) DEVICE — MODEL AT P65, P/N 701554-001KIT CONTENTS: HAND CONTROLLER, 3-WAY HIGH-PRESSURE STOPCOCK WITH ROTATING END AND PREMIUM HIGH-PRESSURE TUBING: Brand: ANGIOTOUCH® KIT

## (undated) DEVICE — CANNULA,OXY,ADULT,SUPER SOFT,W/14'TUB,UC: Brand: MEDLINE INDUSTRIES, INC.

## (undated) DEVICE — MIT PREP PRE/OP 5.5X7IN

## (undated) DEVICE — RADIFOCUS OPTITORQUE ANGIOGRAPHIC CATHETER: Brand: OPTITORQUE

## (undated) DEVICE — CATH IMG IVUS EAGLE EYE PLATIN RX DIGITAL .014IN 5FR

## (undated) DEVICE — INTRO GLIDESHEATH SLENDER SS 21G .021 6F 10CM 45CM

## (undated) DEVICE — CATH LAB PACK: Brand: MEDLINE INDUSTRIES, INC.

## (undated) DEVICE — SHT AIR TRANSFR COMFRT GLIDE LAT 40X80IN

## (undated) DEVICE — DECANTER: Brand: UNBRANDED

## (undated) DEVICE — HI-TORQUE BALANCE MIDDLEWEIGHT UNIVERSAL GUIDE WIRE .014 STRAIGHT TIP 3.0 CM X 190 CM: Brand: HI-TORQUE BALANCE MIDDLEWEIGHT UNIVERSAL

## (undated) DEVICE — RADIFOCUS GLIDEWIRE: Brand: GLIDEWIRE

## (undated) DEVICE — BRACHIAL/AXILLARY/CEREBRAL DRAPE 38 1/2" X 60": Brand: BRACHIAL/AXILLARY/CEREBRAL DRAPE

## (undated) DEVICE — MODEL BT2000 P/N 700287-012KIT CONTENTS: MANIFOLD WITH SALINE AND CONTRAST PORTS, SALINE TUBING WITH SPIKE AND HAND SYRINGE, TRANSDUCER: Brand: BT2000 AUTOMATED MANIFOLD KIT

## (undated) DEVICE — GW PRESS OMNIWIRE STR/TP 185CM

## (undated) DEVICE — CVR PROB ULTRASND GLS STRL

## (undated) DEVICE — CATH GUIDE LAUNCHER EBU3.5 6F 100CM

## (undated) DEVICE — NC TREK CORONARY DILATATION CATHETER 3.25 MM X 20 MM / RAPID-EXCHANGE: Brand: NC TREK

## (undated) DEVICE — GLIDESHEATH SLENDER ACCESS KIT: Brand: GLIDESHEATH SLENDER

## (undated) DEVICE — CONTAINER,SPECIMEN,O.R.STRL,4.5OZ: Brand: MEDLINE

## (undated) DEVICE — DRSNG SURESITE WNDW 4X4.5

## (undated) DEVICE — GW FC FLOP/TP .035 260CM 3MM

## (undated) DEVICE — HEARTRAIL III GUIDING CATHETER: Brand: HEARTRAIL

## (undated) DEVICE — VBT GC 6F .070 XB LAD 3.5 100CM: Brand: VISTA BRITE TIP

## (undated) DEVICE — GLIDESHEATH SLENDER STAINLESS STEEL KIT: Brand: GLIDESHEATH SLENDER

## (undated) DEVICE — GLV SURG SENSICARE SLT PF LF 7.5 STRL

## (undated) DEVICE — A2000 MULTI-USE SYRINGE KIT, P/N 701277-003KIT CONTENTS: 100ML CONTRAST RESERVOIR AND TUBING WITH CONTRAST SPIKE AND CLAMP: Brand: A2000 MULTI-USE SYRINGE KIT

## (undated) DEVICE — BLD CLIP UNIV SURG GRY

## (undated) DEVICE — NC TREK NEO™ CORONARY DILATATION CATHETER 2.75 MM X 15 MM / RAPID-EXCHANGE: Brand: NC TREK NEO™

## (undated) DEVICE — CATH CORNRY OPTITORQUE 1SH TR4 5F 110

## (undated) DEVICE — GW INQWIRE FC PTFE STD J/1.5 .035 260

## (undated) DEVICE — CONTRST ISOVUE370 76PCT 100ML

## (undated) DEVICE — CATH F5 INF JR 4 100CM: Brand: INFINITI

## (undated) DEVICE — NC TREK NEO™ CORONARY DILATATION CATHETER 2.00 MM X 15 MM / RAPID-EXCHANGE: Brand: NC TREK NEO™

## (undated) DEVICE — DEV COMPR RAD TR BND REG 24CM

## (undated) DEVICE — RUNTHROUGH NS EXTRA FLOPPY PTCA GUIDEWIRE: Brand: RUNTHROUGH

## (undated) DEVICE — CATH F5 INF MP A2 100CM 2SH: Brand: INFINITI

## (undated) DEVICE — NC TREK CORONARY DILATATION CATHETER 2.5 MM X 12 MM / RAPID-EXCHANGE: Brand: NC TREK

## (undated) DEVICE — CATH F5INF TLPIGST145 110CM6SH: Brand: INFINITI

## (undated) DEVICE — CATH 4F INF PIG 145Â° 110 CM: Brand: INFINITI

## (undated) DEVICE — APPL CHLORAPREP 1ML CLR STRL